# Patient Record
Sex: MALE | Race: BLACK OR AFRICAN AMERICAN | NOT HISPANIC OR LATINO | Employment: OTHER | RURAL
[De-identification: names, ages, dates, MRNs, and addresses within clinical notes are randomized per-mention and may not be internally consistent; named-entity substitution may affect disease eponyms.]

---

## 2020-07-31 ENCOUNTER — HISTORICAL (OUTPATIENT)
Dept: ADMINISTRATIVE | Facility: HOSPITAL | Age: 50
End: 2020-07-31

## 2020-07-31 LAB — SARS-COV+SARS-COV-2 AG RESP QL IA.RAPID: NEGATIVE

## 2020-10-12 ENCOUNTER — HISTORICAL (OUTPATIENT)
Dept: ADMINISTRATIVE | Facility: HOSPITAL | Age: 50
End: 2020-10-12

## 2020-10-12 LAB
ANION GAP SERPL CALCULATED.3IONS-SCNC: 6 MMOL/L (ref 7–16)
APTT PPP: 22.1 SECONDS (ref 25.2–37.3)
BACTERIA #/AREA URNS HPF: ABNORMAL /HPF
BASOPHILS # BLD AUTO: 0.02 X10E3/UL (ref 0–0.2)
BASOPHILS NFR BLD AUTO: 0.3 % (ref 0–1)
BILIRUB UR QL STRIP: NEGATIVE MG/DL
BUN SERPL-MCNC: 6 MG/DL (ref 7–18)
CALCIUM SERPL-MCNC: 9 MG/DL (ref 8.5–10.1)
CHLORIDE SERPL-SCNC: 101 MMOL/L (ref 98–107)
CLARITY UR: CLEAR
CO2 SERPL-SCNC: 34 MMOL/L (ref 21–32)
COLOR UR: YELLOW
CREAT SERPL-MCNC: 1.06 MG/DL (ref 0.7–1.3)
EOSINOPHIL # BLD AUTO: 0.23 X10E3/UL (ref 0–0.5)
EOSINOPHIL NFR BLD AUTO: 3.6 % (ref 1–4)
ERYTHROCYTE [DISTWIDTH] IN BLOOD BY AUTOMATED COUNT: 17.1 % (ref 11.5–14.5)
GLUCOSE SERPL-MCNC: 115 MG/DL (ref 74–106)
GLUCOSE UR STRIP-MCNC: NEGATIVE MG/DL
HCT VFR BLD AUTO: 42.5 % (ref 40–54)
HGB BLD-MCNC: 13.1 G/DL (ref 13.5–18)
IMM GRANULOCYTES # BLD AUTO: 0.03 X10E3/UL (ref 0–0.04)
IMM GRANULOCYTES NFR BLD: 0.5 % (ref 0–0.4)
INR BLD: 1.04 (ref 0–3.3)
KETONES UR STRIP-SCNC: NEGATIVE MG/DL
LEUKOCYTE ESTERASE UR QL STRIP: NEGATIVE LEU/UL
LYMPHOCYTES # BLD AUTO: 3.05 X10E3/UL (ref 1–4.8)
LYMPHOCYTES NFR BLD AUTO: 47.1 % (ref 27–41)
MCH RBC QN AUTO: 27.3 PG (ref 27–31)
MCHC RBC AUTO-ENTMCNC: 30.8 G/DL (ref 32–36)
MCV RBC AUTO: 88.7 FL (ref 80–96)
MONOCYTES # BLD AUTO: 0.44 X10E3/UL (ref 0–0.8)
MONOCYTES NFR BLD AUTO: 6.8 % (ref 2–6)
MPC BLD CALC-MCNC: 10.1 FL (ref 9.4–12.4)
NEUTROPHILS # BLD AUTO: 2.7 X10E3/UL (ref 1.8–7.7)
NEUTROPHILS NFR BLD AUTO: 41.7 % (ref 53–65)
NITRITE UR QL STRIP: NEGATIVE
NRBC # BLD AUTO: 0 X10E3/UL (ref 0–0)
NRBC, AUTO (.00): 0 /100 (ref 0–0)
PH UR STRIP: 6 PH UNITS (ref 5–8)
PLATELET # BLD AUTO: 371 X10E3/UL (ref 150–400)
POTASSIUM SERPL-SCNC: 3.9 MMOL/L (ref 3.5–5.1)
PROT UR QL STRIP: NEGATIVE MG/DL
PROTHROMBIN TIME: 13.1 SECONDS (ref 11.7–14.7)
RBC # BLD AUTO: 4.79 X10E6/UL (ref 4.6–6.2)
RBC # UR STRIP: NEGATIVE ERY/UL
RBC #/AREA URNS HPF: ABNORMAL /HPF (ref 0–3)
SARS-COV+SARS-COV-2 AG RESP QL IA.RAPID: NEGATIVE
SODIUM SERPL-SCNC: 137 MMOL/L (ref 136–145)
SP GR UR STRIP: 1.02 (ref 1–1.03)
SQUAMOUS #/AREA URNS LPF: ABNORMAL /LPF
UROBILINOGEN UR STRIP-ACNC: 1 EU/DL
WBC # BLD AUTO: 6.47 X10E3/UL (ref 4.5–11)
WBC #/AREA URNS HPF: ABNORMAL /HPF (ref 0–5)

## 2020-10-15 ENCOUNTER — HISTORICAL (OUTPATIENT)
Dept: ADMINISTRATIVE | Facility: HOSPITAL | Age: 50
End: 2020-10-15

## 2020-10-15 LAB
CHOLEST SERPL-MCNC: 200 MG/DL
CHOLEST/HDLC SERPL: 5.1 {RATIO}
EST. AVERAGE GLUCOSE BLD GHB EST-MCNC: 104 MG/DL
HBA1C MFR BLD HPLC: 5.7 %
HDLC SERPL-MCNC: 39 MG/DL
LDLC SERPL CALC-MCNC: 142 MG/DL
TRIGL SERPL-MCNC: 97 MG/DL

## 2021-04-13 RX ORDER — ASPIRIN 325 MG
325 TABLET ORAL DAILY
COMMUNITY
End: 2021-06-14

## 2021-04-13 RX ORDER — OXYCODONE AND ACETAMINOPHEN 10; 325 MG/1; MG/1
1 TABLET ORAL EVERY 12 HOURS PRN
COMMUNITY

## 2021-04-13 RX ORDER — GABAPENTIN 600 MG/1
600 TABLET ORAL DAILY
COMMUNITY

## 2021-04-13 RX ORDER — ATORVASTATIN CALCIUM 10 MG/1
10 TABLET, FILM COATED ORAL DAILY
COMMUNITY
End: 2021-06-10

## 2021-04-13 RX ORDER — DICLOFENAC SODIUM 10 MG/G
2 GEL TOPICAL 4 TIMES DAILY
COMMUNITY

## 2021-04-13 RX ORDER — CYCLOBENZAPRINE HCL 10 MG
10 TABLET ORAL 3 TIMES DAILY PRN
COMMUNITY

## 2021-04-14 ENCOUNTER — HOSPITAL ENCOUNTER (OUTPATIENT)
Facility: HOSPITAL | Age: 51
Discharge: HOME | End: 2021-04-14
Attending: ANESTHESIOLOGY | Admitting: ANESTHESIOLOGY
Payer: COMMERCIAL

## 2021-04-14 VITALS
DIASTOLIC BLOOD PRESSURE: 106 MMHG | HEIGHT: 76 IN | HEART RATE: 83 BPM | RESPIRATION RATE: 18 BRPM | SYSTOLIC BLOOD PRESSURE: 179 MMHG | BODY MASS INDEX: 31.8 KG/M2 | WEIGHT: 261.19 LBS | OXYGEN SATURATION: 97 % | TEMPERATURE: 99 F

## 2021-04-14 DIAGNOSIS — G89.4 CHRONIC PAIN SYNDROME: ICD-10-CM

## 2021-04-14 PROCEDURE — 62370 ANL SP INF PMP W/MDREPRG&FIL: CPT

## 2021-04-14 PROCEDURE — 63600175 PHARM REV CODE 636 W HCPCS

## 2021-04-14 RX ORDER — AMLODIPINE BESYLATE 5 MG/1
5 TABLET ORAL DAILY
COMMUNITY
End: 2021-09-21 | Stop reason: SDUPTHER

## 2021-06-03 RX ORDER — LISINOPRIL 20 MG/1
TABLET ORAL
COMMUNITY
Start: 2021-03-01 | End: 2022-03-07

## 2021-06-03 RX ORDER — CARVEDILOL 12.5 MG/1
TABLET ORAL
COMMUNITY
Start: 2020-12-07 | End: 2021-06-14 | Stop reason: SDUPTHER

## 2021-06-03 RX ORDER — TESTOSTERONE CYPIONATE 200 MG/ML
INJECTION, SOLUTION INTRAMUSCULAR
COMMUNITY
Start: 2021-04-05

## 2021-06-03 RX ORDER — LISINOPRIL 10 MG/1
TABLET ORAL
COMMUNITY
Start: 2020-12-07 | End: 2021-06-10

## 2021-06-03 RX ORDER — ATORVASTATIN CALCIUM 80 MG/1
80 TABLET, FILM COATED ORAL NIGHTLY
COMMUNITY
Start: 2020-12-07 | End: 2023-02-14 | Stop reason: ALTCHOICE

## 2021-06-10 ENCOUNTER — OFFICE VISIT (OUTPATIENT)
Dept: DERMATOLOGY | Facility: CLINIC | Age: 51
End: 2021-06-10
Payer: COMMERCIAL

## 2021-06-10 VITALS — WEIGHT: 264 LBS | RESPIRATION RATE: 16 BRPM | HEIGHT: 76 IN | BODY MASS INDEX: 32.15 KG/M2

## 2021-06-10 DIAGNOSIS — R20.2 NOTALGIA PARESTHETICA: Primary | ICD-10-CM

## 2021-06-10 DIAGNOSIS — L73.9 FOLLICULITIS: ICD-10-CM

## 2021-06-10 PROCEDURE — 99203 PR OFFICE/OUTPT VISIT, NEW, LEVL III, 30-44 MIN: ICD-10-PCS | Mod: ,,, | Performed by: DERMATOLOGY

## 2021-06-10 PROCEDURE — 99203 OFFICE O/P NEW LOW 30 MIN: CPT | Mod: ,,, | Performed by: DERMATOLOGY

## 2021-06-10 PROCEDURE — 3008F PR BODY MASS INDEX (BMI) DOCUMENTED: ICD-10-PCS | Mod: CPTII,,, | Performed by: DERMATOLOGY

## 2021-06-10 PROCEDURE — 3008F BODY MASS INDEX DOCD: CPT | Mod: CPTII,,, | Performed by: DERMATOLOGY

## 2021-06-10 RX ORDER — BENZOYL PEROXIDE 100 MG/ML
LIQUID TOPICAL
Qty: 148 G | Refills: 12 | Status: SHIPPED | OUTPATIENT
Start: 2021-06-10 | End: 2021-09-21

## 2021-06-10 RX ORDER — ERGOCALCIFEROL 1.25 MG/1
50000 CAPSULE ORAL
COMMUNITY
End: 2022-05-10 | Stop reason: ALTCHOICE

## 2021-06-10 RX ORDER — DOXYCYCLINE 100 MG/1
100 CAPSULE ORAL 2 TIMES DAILY
Qty: 28 CAPSULE | Refills: 0 | Status: SHIPPED | OUTPATIENT
Start: 2021-06-10 | End: 2022-05-10 | Stop reason: ALTCHOICE

## 2021-06-14 ENCOUNTER — OFFICE VISIT (OUTPATIENT)
Dept: CARDIOLOGY | Facility: CLINIC | Age: 51
End: 2021-06-14
Payer: COMMERCIAL

## 2021-06-14 VITALS
WEIGHT: 248 LBS | BODY MASS INDEX: 31.83 KG/M2 | RESPIRATION RATE: 18 BRPM | DIASTOLIC BLOOD PRESSURE: 82 MMHG | HEIGHT: 74 IN | HEART RATE: 64 BPM | SYSTOLIC BLOOD PRESSURE: 120 MMHG

## 2021-06-14 DIAGNOSIS — I42.8 NON-ISCHEMIC CARDIOMYOPATHY: Primary | ICD-10-CM

## 2021-06-14 DIAGNOSIS — E78.2 MIXED HYPERLIPIDEMIA: ICD-10-CM

## 2021-06-14 DIAGNOSIS — I10 HYPERTENSION, UNSPECIFIED TYPE: Primary | ICD-10-CM

## 2021-06-14 DIAGNOSIS — I10 ESSENTIAL HYPERTENSION: ICD-10-CM

## 2021-06-14 DIAGNOSIS — I10 HYPERTENSION, UNSPECIFIED TYPE: ICD-10-CM

## 2021-06-14 PROCEDURE — 3008F BODY MASS INDEX DOCD: CPT | Mod: CPTII,,, | Performed by: STUDENT IN AN ORGANIZED HEALTH CARE EDUCATION/TRAINING PROGRAM

## 2021-06-14 PROCEDURE — 99214 OFFICE O/P EST MOD 30 MIN: CPT | Mod: S$PBB,,, | Performed by: STUDENT IN AN ORGANIZED HEALTH CARE EDUCATION/TRAINING PROGRAM

## 2021-06-14 PROCEDURE — 3008F PR BODY MASS INDEX (BMI) DOCUMENTED: ICD-10-PCS | Mod: CPTII,,, | Performed by: STUDENT IN AN ORGANIZED HEALTH CARE EDUCATION/TRAINING PROGRAM

## 2021-06-14 PROCEDURE — 93010 EKG 12-LEAD: ICD-10-PCS | Mod: S$PBB,,, | Performed by: STUDENT IN AN ORGANIZED HEALTH CARE EDUCATION/TRAINING PROGRAM

## 2021-06-14 PROCEDURE — 99214 OFFICE O/P EST MOD 30 MIN: CPT | Mod: PBBFAC,25 | Performed by: STUDENT IN AN ORGANIZED HEALTH CARE EDUCATION/TRAINING PROGRAM

## 2021-06-14 PROCEDURE — 99214 PR OFFICE/OUTPT VISIT, EST, LEVL IV, 30-39 MIN: ICD-10-PCS | Mod: S$PBB,,, | Performed by: STUDENT IN AN ORGANIZED HEALTH CARE EDUCATION/TRAINING PROGRAM

## 2021-06-14 PROCEDURE — 93010 ELECTROCARDIOGRAM REPORT: CPT | Mod: S$PBB,,, | Performed by: STUDENT IN AN ORGANIZED HEALTH CARE EDUCATION/TRAINING PROGRAM

## 2021-06-14 PROCEDURE — 93005 ELECTROCARDIOGRAM TRACING: CPT | Mod: PBBFAC | Performed by: STUDENT IN AN ORGANIZED HEALTH CARE EDUCATION/TRAINING PROGRAM

## 2021-06-14 RX ORDER — FLUTICASONE PROPIONATE 50 MCG
SPRAY, SUSPENSION (ML) NASAL
COMMUNITY
Start: 2021-03-01 | End: 2022-11-15 | Stop reason: SDUPTHER

## 2021-06-14 RX ORDER — CARVEDILOL 25 MG/1
25 TABLET ORAL 2 TIMES DAILY WITH MEALS
Qty: 180 TABLET | Refills: 3 | Status: SHIPPED | OUTPATIENT
Start: 2021-06-14 | End: 2022-08-25

## 2021-06-14 RX ORDER — NAPROXEN SODIUM 220 MG/1
81 TABLET, FILM COATED ORAL DAILY
Qty: 90 TABLET | Refills: 3 | Status: SHIPPED | OUTPATIENT
Start: 2021-06-14 | End: 2022-09-26 | Stop reason: SDUPTHER

## 2021-08-11 ENCOUNTER — OFFICE VISIT (OUTPATIENT)
Dept: DERMATOLOGY | Facility: CLINIC | Age: 51
End: 2021-08-11
Payer: COMMERCIAL

## 2021-08-11 VITALS — RESPIRATION RATE: 16 BRPM | HEIGHT: 74 IN | WEIGHT: 248 LBS | BODY MASS INDEX: 31.83 KG/M2

## 2021-08-11 DIAGNOSIS — L73.9 FOLLICULITIS: Primary | ICD-10-CM

## 2021-08-11 PROCEDURE — 1160F RVW MEDS BY RX/DR IN RCRD: CPT | Mod: CPTII,,, | Performed by: DERMATOLOGY

## 2021-08-11 PROCEDURE — 99212 PR OFFICE/OUTPT VISIT, EST, LEVL II, 10-19 MIN: ICD-10-PCS | Mod: ,,, | Performed by: DERMATOLOGY

## 2021-08-11 PROCEDURE — 3008F BODY MASS INDEX DOCD: CPT | Mod: CPTII,,, | Performed by: DERMATOLOGY

## 2021-08-11 PROCEDURE — 1159F MED LIST DOCD IN RCRD: CPT | Mod: CPTII,,, | Performed by: DERMATOLOGY

## 2021-08-11 PROCEDURE — 1159F PR MEDICATION LIST DOCUMENTED IN MEDICAL RECORD: ICD-10-PCS | Mod: CPTII,,, | Performed by: DERMATOLOGY

## 2021-08-11 PROCEDURE — 99212 OFFICE O/P EST SF 10 MIN: CPT | Mod: ,,, | Performed by: DERMATOLOGY

## 2021-08-11 PROCEDURE — 3008F PR BODY MASS INDEX (BMI) DOCUMENTED: ICD-10-PCS | Mod: CPTII,,, | Performed by: DERMATOLOGY

## 2021-08-11 PROCEDURE — 1160F PR REVIEW ALL MEDS BY PRESCRIBER/CLIN PHARMACIST DOCUMENTED: ICD-10-PCS | Mod: CPTII,,, | Performed by: DERMATOLOGY

## 2021-08-11 RX ORDER — ASPIRIN 325 MG
TABLET, DELAYED RELEASE (ENTERIC COATED) ORAL
COMMUNITY
Start: 2021-07-06 | End: 2022-11-15

## 2021-09-14 ENCOUNTER — HOSPITAL ENCOUNTER (OUTPATIENT)
Dept: CARDIOLOGY | Facility: HOSPITAL | Age: 51
Discharge: HOME | End: 2021-09-14
Attending: STUDENT IN AN ORGANIZED HEALTH CARE EDUCATION/TRAINING PROGRAM
Payer: MEDICARE

## 2021-09-14 DIAGNOSIS — I42.8 NON-ISCHEMIC CARDIOMYOPATHY: ICD-10-CM

## 2021-09-14 PROCEDURE — 93306 TTE W/DOPPLER COMPLETE: CPT

## 2021-09-14 PROCEDURE — 93306 TTE W/DOPPLER COMPLETE: CPT | Mod: 26,,, | Performed by: STUDENT IN AN ORGANIZED HEALTH CARE EDUCATION/TRAINING PROGRAM

## 2021-09-14 PROCEDURE — 93306 ECHO (CUPID ONLY): ICD-10-PCS | Mod: 26,,, | Performed by: STUDENT IN AN ORGANIZED HEALTH CARE EDUCATION/TRAINING PROGRAM

## 2021-09-20 LAB
AORTIC ROOT ANNULUS: 2.8 CM
AORTIC VALVE CUSP SEPERATION: 2.42 CM
AV INDEX (PROSTH): 0.58
AV MEAN GRADIENT: 5 MMHG
AV PEAK GRADIENT: 9 MMHG
AV VALVE AREA: 2.63 CM2
AV VELOCITY RATIO: 0.67
CV ECHO LV RWT: 0.6 CM
DOP CALC AO PEAK VEL: 1.5 M/S
DOP CALC AO VTI: 31 CM
DOP CALC LVOT AREA: 4.5 CM2
DOP CALC LVOT DIAMETER: 2.4 CM
DOP CALC LVOT PEAK VEL: 1 M/S
DOP CALC LVOT STROKE VOLUME: 81.39 CM3
DOP CALCLVOT PEAK VEL VTI: 18 CM
E WAVE DECELERATION TIME: 192 MSEC
ECHO EF ESTIMATED: 45 %
ECHO LV POSTERIOR WALL: 1.54 CM (ref 0.6–1.1)
EJECTION FRACTION: 45 %
FRACTIONAL SHORTENING: 23 % (ref 28–44)
INTERVENTRICULAR SEPTUM: 1.57 CM (ref 0.6–1.1)
IVC OSTIUM: 1.3 CM
LEFT ATRIUM SIZE: 3.8 CM
LEFT INTERNAL DIMENSION IN SYSTOLE: 4 CM (ref 2.1–4)
LEFT VENTRICULAR INTERNAL DIMENSION IN DIASTOLE: 5.17 CM (ref 3.5–6)
LEFT VENTRICULAR MASS: 357.92 G
LVOT MG: 2 MMHG
MV PEAK E VEL: 0.6 M/S
PISA TR MAX VEL: 2.1 M/S
RA MAJOR: 3.9 CM
RA PRESSURE: 3 MMHG
RIGHT VENTRICULAR END-DIASTOLIC DIMENSION: 3.8 CM
TR MAX PG: 18 MMHG
TRICUSPID ANNULAR PLANE SYSTOLIC EXCURSION: 2.1 CM
TV REST PULMONARY ARTERY PRESSURE: 21 MMHG

## 2021-09-21 ENCOUNTER — OFFICE VISIT (OUTPATIENT)
Dept: CARDIOLOGY | Facility: CLINIC | Age: 51
End: 2021-09-21
Payer: MEDICARE

## 2021-09-21 VITALS
HEART RATE: 64 BPM | RESPIRATION RATE: 18 BRPM | WEIGHT: 252 LBS | SYSTOLIC BLOOD PRESSURE: 110 MMHG | DIASTOLIC BLOOD PRESSURE: 70 MMHG | HEIGHT: 74 IN | BODY MASS INDEX: 32.34 KG/M2

## 2021-09-21 DIAGNOSIS — I42.8 NON-ISCHEMIC CARDIOMYOPATHY: ICD-10-CM

## 2021-09-21 DIAGNOSIS — I10 ESSENTIAL HYPERTENSION: ICD-10-CM

## 2021-09-21 PROCEDURE — 99214 OFFICE O/P EST MOD 30 MIN: CPT | Mod: PBBFAC | Performed by: STUDENT IN AN ORGANIZED HEALTH CARE EDUCATION/TRAINING PROGRAM

## 2021-09-21 PROCEDURE — 3078F PR MOST RECENT DIASTOLIC BLOOD PRESSURE < 80 MM HG: ICD-10-PCS | Mod: CPTII,,, | Performed by: STUDENT IN AN ORGANIZED HEALTH CARE EDUCATION/TRAINING PROGRAM

## 2021-09-21 PROCEDURE — 3008F BODY MASS INDEX DOCD: CPT | Mod: CPTII,,, | Performed by: STUDENT IN AN ORGANIZED HEALTH CARE EDUCATION/TRAINING PROGRAM

## 2021-09-21 PROCEDURE — 99214 PR OFFICE/OUTPT VISIT, EST, LEVL IV, 30-39 MIN: ICD-10-PCS | Mod: S$PBB,,, | Performed by: STUDENT IN AN ORGANIZED HEALTH CARE EDUCATION/TRAINING PROGRAM

## 2021-09-21 PROCEDURE — 99214 OFFICE O/P EST MOD 30 MIN: CPT | Mod: S$PBB,,, | Performed by: STUDENT IN AN ORGANIZED HEALTH CARE EDUCATION/TRAINING PROGRAM

## 2021-09-21 PROCEDURE — 3074F SYST BP LT 130 MM HG: CPT | Mod: CPTII,,, | Performed by: STUDENT IN AN ORGANIZED HEALTH CARE EDUCATION/TRAINING PROGRAM

## 2021-09-21 PROCEDURE — 3008F PR BODY MASS INDEX (BMI) DOCUMENTED: ICD-10-PCS | Mod: CPTII,,, | Performed by: STUDENT IN AN ORGANIZED HEALTH CARE EDUCATION/TRAINING PROGRAM

## 2021-09-21 PROCEDURE — 4010F ACE/ARB THERAPY RXD/TAKEN: CPT | Mod: CPTII,,, | Performed by: STUDENT IN AN ORGANIZED HEALTH CARE EDUCATION/TRAINING PROGRAM

## 2021-09-21 PROCEDURE — 3074F PR MOST RECENT SYSTOLIC BLOOD PRESSURE < 130 MM HG: ICD-10-PCS | Mod: CPTII,,, | Performed by: STUDENT IN AN ORGANIZED HEALTH CARE EDUCATION/TRAINING PROGRAM

## 2021-09-21 PROCEDURE — 1159F PR MEDICATION LIST DOCUMENTED IN MEDICAL RECORD: ICD-10-PCS | Mod: CPTII,,, | Performed by: STUDENT IN AN ORGANIZED HEALTH CARE EDUCATION/TRAINING PROGRAM

## 2021-09-21 PROCEDURE — 3078F DIAST BP <80 MM HG: CPT | Mod: CPTII,,, | Performed by: STUDENT IN AN ORGANIZED HEALTH CARE EDUCATION/TRAINING PROGRAM

## 2021-09-21 PROCEDURE — 1159F MED LIST DOCD IN RCRD: CPT | Mod: CPTII,,, | Performed by: STUDENT IN AN ORGANIZED HEALTH CARE EDUCATION/TRAINING PROGRAM

## 2021-09-21 PROCEDURE — 4010F PR ACE/ARB THEARPY RXD/TAKEN: ICD-10-PCS | Mod: CPTII,,, | Performed by: STUDENT IN AN ORGANIZED HEALTH CARE EDUCATION/TRAINING PROGRAM

## 2021-09-21 RX ORDER — AMLODIPINE BESYLATE 5 MG/1
5 TABLET ORAL DAILY
Qty: 90 TABLET | Refills: 3 | Status: SHIPPED | OUTPATIENT
Start: 2021-09-21 | End: 2022-09-26 | Stop reason: SDUPTHER

## 2021-10-25 DIAGNOSIS — Z11.59 SCREENING FOR VIRAL DISEASE: Primary | ICD-10-CM

## 2021-10-27 ENCOUNTER — HOSPITAL ENCOUNTER (OUTPATIENT)
Facility: HOSPITAL | Age: 51
Discharge: HOME | End: 2021-10-27
Attending: ANESTHESIOLOGY | Admitting: ANESTHESIOLOGY
Payer: MEDICARE

## 2021-10-27 VITALS
OXYGEN SATURATION: 96 % | WEIGHT: 260 LBS | BODY MASS INDEX: 31.66 KG/M2 | HEART RATE: 68 BPM | HEIGHT: 76 IN | RESPIRATION RATE: 18 BRPM | DIASTOLIC BLOOD PRESSURE: 88 MMHG | SYSTOLIC BLOOD PRESSURE: 149 MMHG

## 2021-10-27 DIAGNOSIS — G89.4 CHRONIC PAIN SYNDROME: ICD-10-CM

## 2021-10-27 PROCEDURE — 27201423 OPTIME MED/SURG SUP & DEVICES STERILE SUPPLY: Performed by: ANESTHESIOLOGY

## 2021-10-27 PROCEDURE — 62370 ANL SP INF PMP W/MDREPRG&FIL: CPT | Performed by: ANESTHESIOLOGY

## 2022-04-13 ENCOUNTER — HOSPITAL ENCOUNTER (OUTPATIENT)
Facility: HOSPITAL | Age: 52
Discharge: HOME | End: 2022-04-13
Attending: ANESTHESIOLOGY | Admitting: ANESTHESIOLOGY
Payer: MEDICARE

## 2022-04-13 VITALS
HEIGHT: 76 IN | RESPIRATION RATE: 18 BRPM | OXYGEN SATURATION: 95 % | SYSTOLIC BLOOD PRESSURE: 138 MMHG | HEART RATE: 78 BPM | DIASTOLIC BLOOD PRESSURE: 83 MMHG | WEIGHT: 273 LBS | BODY MASS INDEX: 33.24 KG/M2

## 2022-04-13 DIAGNOSIS — G89.4 CHRONIC PAIN SYNDROME: ICD-10-CM

## 2022-04-13 PROCEDURE — 62370 ANL SP INF PMP W/MDREPRG&FIL: CPT | Performed by: ANESTHESIOLOGY

## 2022-04-13 PROCEDURE — 63600175 PHARM REV CODE 636 W HCPCS: Performed by: ANESTHESIOLOGY

## 2022-04-13 RX ADMIN — MORPHINE SULFATE 400 MG: 10 INJECTION, SOLUTION EPIDURAL; INTRATHECAL at 11:04

## 2022-04-13 NOTE — OP NOTE
Intrathecal pump refill and reprogramming           The patient was identified in the exam room and the pump was interrogated using the Medtronic system.  The pump was noted to have 4.0 cc remaining of a solution of morphine 10 mg per mL.  The patient's abdomen was marked in the appropriate location and prepped and draped in usual sterile fashion.  A 40 mL Medtronic pump refill kit was utilized for this procedure.  After the patient abdomen was prepped and draped in the usual sterile fashion, the Medtronic 40 mL template was placed on the skin overlying the target area of the pump orifice.  A 22-gauge noncoring needle was then utilized to access the pump.  A 20 mL syringe was utilized to aspirate the contents of the pump.Actual removed 4cc.   The solution was clear with no signs of serous fluid or blood.  The patient then had a 40 mL allotment of morphine 10 mg per mL injected incrementally using the syringe and filter.  The needle was removed with its tip intact and the patient tolerated the procedure well with no adverse events.  The pump was then reprogrammed using the Medtronic system.

## 2022-05-10 ENCOUNTER — OFFICE VISIT (OUTPATIENT)
Dept: FAMILY MEDICINE | Facility: CLINIC | Age: 52
End: 2022-05-10
Payer: MEDICARE

## 2022-05-10 VITALS
TEMPERATURE: 98 F | BODY MASS INDEX: 32.39 KG/M2 | HEART RATE: 71 BPM | WEIGHT: 266 LBS | OXYGEN SATURATION: 98 % | RESPIRATION RATE: 18 BRPM | HEIGHT: 76 IN | SYSTOLIC BLOOD PRESSURE: 134 MMHG | DIASTOLIC BLOOD PRESSURE: 86 MMHG

## 2022-05-10 DIAGNOSIS — R53.83 MALAISE AND FATIGUE: ICD-10-CM

## 2022-05-10 DIAGNOSIS — R53.81 MALAISE AND FATIGUE: ICD-10-CM

## 2022-05-10 DIAGNOSIS — J01.00 ACUTE NON-RECURRENT MAXILLARY SINUSITIS: ICD-10-CM

## 2022-05-10 DIAGNOSIS — R05.9 COUGH: Primary | ICD-10-CM

## 2022-05-10 LAB
CTP QC/QA: YES
FLUAV AG NPH QL: NEGATIVE
FLUBV AG NPH QL: NEGATIVE
SARS-COV-2 AG RESP QL IA.RAPID: NEGATIVE

## 2022-05-10 PROCEDURE — 3008F PR BODY MASS INDEX (BMI) DOCUMENTED: ICD-10-PCS | Mod: ,,, | Performed by: FAMILY MEDICINE

## 2022-05-10 PROCEDURE — 3075F PR MOST RECENT SYSTOLIC BLOOD PRESS GE 130-139MM HG: ICD-10-PCS | Mod: ,,, | Performed by: FAMILY MEDICINE

## 2022-05-10 PROCEDURE — 96372 THER/PROPH/DIAG INJ SC/IM: CPT | Mod: ,,, | Performed by: FAMILY MEDICINE

## 2022-05-10 PROCEDURE — 87428 POCT SARS-COV2 (COVID) WITH FLU ANTIGEN: ICD-10-PCS | Mod: QW,,, | Performed by: FAMILY MEDICINE

## 2022-05-10 PROCEDURE — 3079F PR MOST RECENT DIASTOLIC BLOOD PRESSURE 80-89 MM HG: ICD-10-PCS | Mod: ,,, | Performed by: FAMILY MEDICINE

## 2022-05-10 PROCEDURE — 1159F MED LIST DOCD IN RCRD: CPT | Mod: ,,, | Performed by: FAMILY MEDICINE

## 2022-05-10 PROCEDURE — 3079F DIAST BP 80-89 MM HG: CPT | Mod: ,,, | Performed by: FAMILY MEDICINE

## 2022-05-10 PROCEDURE — 96372 PR INJECTION,THERAP/PROPH/DIAG2ST, IM OR SUBCUT: ICD-10-PCS | Mod: ,,, | Performed by: FAMILY MEDICINE

## 2022-05-10 PROCEDURE — 3075F SYST BP GE 130 - 139MM HG: CPT | Mod: ,,, | Performed by: FAMILY MEDICINE

## 2022-05-10 PROCEDURE — 1160F RVW MEDS BY RX/DR IN RCRD: CPT | Mod: ,,, | Performed by: FAMILY MEDICINE

## 2022-05-10 PROCEDURE — 87428 SARSCOV & INF VIR A&B AG IA: CPT | Mod: QW,,, | Performed by: FAMILY MEDICINE

## 2022-05-10 PROCEDURE — 99213 PR OFFICE/OUTPT VISIT, EST, LEVL III, 20-29 MIN: ICD-10-PCS | Mod: 25,,, | Performed by: FAMILY MEDICINE

## 2022-05-10 PROCEDURE — 4010F PR ACE/ARB THEARPY RXD/TAKEN: ICD-10-PCS | Mod: ,,, | Performed by: FAMILY MEDICINE

## 2022-05-10 PROCEDURE — 1160F PR REVIEW ALL MEDS BY PRESCRIBER/CLIN PHARMACIST DOCUMENTED: ICD-10-PCS | Mod: ,,, | Performed by: FAMILY MEDICINE

## 2022-05-10 PROCEDURE — 3008F BODY MASS INDEX DOCD: CPT | Mod: ,,, | Performed by: FAMILY MEDICINE

## 2022-05-10 PROCEDURE — 1159F PR MEDICATION LIST DOCUMENTED IN MEDICAL RECORD: ICD-10-PCS | Mod: ,,, | Performed by: FAMILY MEDICINE

## 2022-05-10 PROCEDURE — 4010F ACE/ARB THERAPY RXD/TAKEN: CPT | Mod: ,,, | Performed by: FAMILY MEDICINE

## 2022-05-10 PROCEDURE — 99213 OFFICE O/P EST LOW 20 MIN: CPT | Mod: 25,,, | Performed by: FAMILY MEDICINE

## 2022-05-10 RX ORDER — BETAMETHASONE SODIUM PHOSPHATE AND BETAMETHASONE ACETATE 3; 3 MG/ML; MG/ML
6 INJECTION, SUSPENSION INTRA-ARTICULAR; INTRALESIONAL; INTRAMUSCULAR; SOFT TISSUE
Status: COMPLETED | OUTPATIENT
Start: 2022-05-10 | End: 2022-05-10

## 2022-05-10 RX ORDER — BENZONATATE 100 MG/1
100 CAPSULE ORAL 3 TIMES DAILY PRN
Qty: 60 CAPSULE | Refills: 2 | Status: SHIPPED | OUTPATIENT
Start: 2022-05-10 | End: 2022-05-20

## 2022-05-10 RX ORDER — SULFAMETHOXAZOLE AND TRIMETHOPRIM 800; 160 MG/1; MG/1
1 TABLET ORAL 2 TIMES DAILY
Qty: 20 TABLET | Refills: 0 | Status: SHIPPED | OUTPATIENT
Start: 2022-05-10 | End: 2022-11-15

## 2022-05-10 RX ORDER — LINCOMYCIN HYDROCHLORIDE 300 MG/ML
600 INJECTION, SOLUTION INTRAMUSCULAR; INTRAVENOUS; SUBCONJUNCTIVAL
Status: COMPLETED | OUTPATIENT
Start: 2022-05-10 | End: 2022-05-10

## 2022-05-10 RX ORDER — FLUTICASONE PROPIONATE 50 MCG
2 SPRAY, SUSPENSION (ML) NASAL DAILY
Qty: 16 G | Refills: 2 | Status: SHIPPED | OUTPATIENT
Start: 2022-05-10 | End: 2022-08-03

## 2022-05-10 RX ADMIN — BETAMETHASONE SODIUM PHOSPHATE AND BETAMETHASONE ACETATE 6 MG: 3; 3 INJECTION, SUSPENSION INTRA-ARTICULAR; INTRALESIONAL; INTRAMUSCULAR; SOFT TISSUE at 04:05

## 2022-05-10 RX ADMIN — LINCOMYCIN HYDROCHLORIDE 600 MG: 300 INJECTION, SOLUTION INTRAMUSCULAR; INTRAVENOUS; SUBCONJUNCTIVAL at 04:05

## 2022-05-10 NOTE — PROGRESS NOTES
Jesse Andrade is a 52 y.o. male seen today for a 3 week history of worsening nasal congestion postnasal drainage and occasionally productive cough.  So far patient has been afebrile.  His symptoms have not responded to over-the-counter treatments.    Past Medical History:   Diagnosis Date    Arthritis     Hyperlipidemia     Hypertension      Family History   Problem Relation Age of Onset    Coronary artery disease Mother      Current Outpatient Medications on File Prior to Visit   Medication Sig Dispense Refill    amLODIPine (NORVASC) 5 MG tablet Take 1 tablet (5 mg total) by mouth once daily. 90 tablet 3    aspirin 81 MG Chew Take 1 tablet (81 mg total) by mouth once daily. 90 tablet 3    atorvastatin (LIPITOR) 80 MG tablet       calcium carbonate/vitamin D3 (VITAMIN D-3 ORAL) Take by mouth.      carvediloL (COREG) 25 MG tablet Take 1 tablet (25 mg total) by mouth 2 (two) times daily with meals. 180 tablet 3    cholecalciferol, vitamin D3, 1,250 mcg (50,000 unit) capsule       cyclobenzaprine (FLEXERIL) 10 MG tablet Take 10 mg by mouth 3 (three) times daily as needed for Muscle spasms.      diclofenac sodium (VOLTAREN) 1 % Gel Apply 2 g topically 4 (four) times daily.      fluticasone propionate (FLONASE) 50 mcg/actuation nasal spray       gabapentin (NEURONTIN) 600 MG tablet Take 600 mg by mouth once daily.      lisinopriL (PRINIVIL,ZESTRIL) 20 MG tablet TAKE 1 TABLET BY MOUTH ONCE DAILY FOR BLOOD PRESSURE 90 tablet 1    oxyCODONE-acetaminophen (PERCOCET)  mg per tablet Take 1 tablet by mouth every 12 (twelve) hours as needed for Pain.      testosterone cypionate (DEPOTESTOTERONE CYPIONATE) 200 mg/mL injection       doxycycline (VIBRAMYCIN) 100 MG Cap Take 1 capsule (100 mg total) by mouth 2 (two) times daily. (Patient not taking: Reported on 5/10/2022) 28 capsule 0    ergocalciferol (ERGOCALCIFEROL) 50,000 unit Cap Take 50,000 Units by mouth every 7 days.       No current  facility-administered medications on file prior to visit.     Immunization History   Administered Date(s) Administered    COVID-19, MRNA, LN-S, PF (MODERNA FULL 0.5 ML DOSE) 06/29/2021, 08/06/2021       Review of Systems   Constitutional: Negative for fever, malaise/fatigue and weight loss.   HENT: Positive for congestion and sinus pain.    Respiratory: Positive for cough. Negative for shortness of breath.    Cardiovascular: Negative for chest pain and palpitations.   Gastrointestinal: Negative for nausea and vomiting.   Psychiatric/Behavioral: Negative for depression.        Vitals:    05/10/22 1507   BP: 134/86   Pulse: 71   Resp: 18   Temp: 98.3 °F (36.8 °C)       Physical Exam  Vitals reviewed.   Constitutional:       Appearance: Normal appearance.   HENT:      Head: Normocephalic.      Nose:      Right Turbinates: Swollen.      Left Turbinates: Swollen.      Left Sinus: Maxillary sinus tenderness present.   Eyes:      Extraocular Movements: Extraocular movements intact.      Conjunctiva/sclera: Conjunctivae normal.      Pupils: Pupils are equal, round, and reactive to light.   Neck:      Thyroid: No thyroid mass or thyromegaly.   Cardiovascular:      Rate and Rhythm: Normal rate and regular rhythm.      Heart sounds: Normal heart sounds. No murmur heard.    No gallop.   Pulmonary:      Effort: Pulmonary effort is normal. No respiratory distress.      Breath sounds: Rhonchi present. No wheezing or rales.   Skin:     General: Skin is warm and dry.      Coloration: Skin is not jaundiced or pale.   Neurological:      Mental Status: He is alert.   Psychiatric:         Mood and Affect: Mood normal.         Behavior: Behavior normal.         Thought Content: Thought content normal.         Judgment: Judgment normal.          Assessment and Plan  Cough  -     POCT SARS-COV2 (COVID) with Flu Antigen  -     X-Ray Chest PA And Lateral; Future; Expected date: 05/10/2022    Malaise and fatigue  -     POCT SARS-COV2 (COVID)  with Flu Antigen                  Chest x-ray was read as normal.      Return to clinic if symptoms worsen or persist.  I did recommend over-the-counter Mucinex 1200 mg plain b.i.d. with increase intake of water.    Health Maintenance Topics with due status: Not Due       Topic Last Completion Date    Influenza Vaccine Not Due

## 2022-06-29 DIAGNOSIS — G47.33 OSA (OBSTRUCTIVE SLEEP APNEA): Primary | ICD-10-CM

## 2022-09-12 ENCOUNTER — PROCEDURE VISIT (OUTPATIENT)
Dept: SLEEP MEDICINE | Facility: HOSPITAL | Age: 52
End: 2022-09-12
Attending: INTERNAL MEDICINE
Payer: MEDICARE

## 2022-09-12 DIAGNOSIS — G47.33 OSA (OBSTRUCTIVE SLEEP APNEA): ICD-10-CM

## 2022-09-12 PROCEDURE — 95811 POLYSOM 6/>YRS CPAP 4/> PARM: CPT | Mod: PO

## 2022-09-23 NOTE — PROGRESS NOTES
PCP: Otilio Vargas MD    Referring Provider:     Subjective:   Jesse Andrade is a 52 y.o. male with hx of HTN, HLD, NICM (LVEF recovered to 40-45%) who presents for followup.     22 -  Patient statse he has been doing well over the past year.   Patient denies chest pain or shortness of breath.   Blood pressure elevated.  States he is taking medications.     21 - Patient states he is doing well.  Denies chest pain, SOB or orthopnea.  Denies lower extremity edema.  Blood pressure controlled.      EKG  21:  Normal sinus rhythm.  Voltage criteria for left ventricular hypertrophy   ECHO 21:  The ]left ventricle is normal in size with mild concentric hypertrophy and mildly decreased systolic function.  EF 45%.                              There is left ventricular global hypokinesis.                              Normal RV size and function.             20 - mild LV dilation. LVEF 40-45%. Nl RV   LHC 2018 - non-obst CAD. LV gram 35-40%    Fhx: Mother -  off heart attack  Shx: Denies smoking, etoh or drug      Lab Results   Component Value Date     10/12/2020    K 3.9 10/12/2020     10/12/2020    CO2 34 (H) 10/12/2020    BUN 6 (L) 10/12/2020    CREATININE 1.060 10/12/2020    CALCIUM 9.0 10/12/2020    ANIONGAP 6 (L) 10/12/2020    ESTGFRAFRICA 95 10/12/2020    EGFRNONAA 79 10/12/2020       Lab Results   Component Value Date    CHOL 200 10/15/2020     Lab Results   Component Value Date    HDL 39 10/15/2020     Lab Results   Component Value Date    LDLCALC 142 10/15/2020     Lab Results   Component Value Date    TRIG 97 10/15/2020     Lab Results   Component Value Date    CHOLHDL 5.1 10/15/2020       Lab Results   Component Value Date    WBC 6.47 10/12/2020    HGB 13.1 (L) 10/12/2020    HCT 42.5 10/12/2020    MCV 88.7 10/12/2020     10/12/2020           Review of Systems   Respiratory:  Negative for cough and shortness of breath.    Cardiovascular:  Negative for chest  "pain, palpitations, orthopnea, claudication, leg swelling and PND.       Objective:   BP (!) 130/90   Pulse 62   Resp 18   Ht 6' 2" (1.88 m)   Wt 120.7 kg (266 lb)   BMI 34.15 kg/m²     Physical Exam  Vitals and nursing note reviewed.   Cardiovascular:      Rate and Rhythm: Normal rate and regular rhythm.      Pulses: Normal pulses.      Heart sounds: Normal heart sounds.   Pulmonary:      Breath sounds: Normal breath sounds.   Neurological:      Mental Status: He is oriented to person, place, and time.         Assessment:     1. Hypertension, unspecified type  EKG 12-lead      2. Non-ischemic cardiomyopathy                Plan:   Non-ischemic cardiomyopathy  non-ischemic. [facility] in 2018 with mild non-obstructive CAD.   - EF recovered from 35-40% to 40/45% on recent echo  - GDMT: coreg 25mg bid, increase lisinorpril to 40mg;   - Device; not indicated  - Repeat ECHO with EF of 45%. Continue managment       Hypertension  On coreg, lisinopirl and amlodipine 5mg qd     Hyperlipidemia  On lipitor 80mg qd      "

## 2022-09-26 ENCOUNTER — OFFICE VISIT (OUTPATIENT)
Dept: CARDIOLOGY | Facility: CLINIC | Age: 52
End: 2022-09-26
Payer: MEDICARE

## 2022-09-26 VITALS
HEIGHT: 74 IN | HEART RATE: 62 BPM | RESPIRATION RATE: 18 BRPM | DIASTOLIC BLOOD PRESSURE: 90 MMHG | SYSTOLIC BLOOD PRESSURE: 130 MMHG | BODY MASS INDEX: 34.14 KG/M2 | WEIGHT: 266 LBS

## 2022-09-26 DIAGNOSIS — I10 HYPERTENSION, UNSPECIFIED TYPE: ICD-10-CM

## 2022-09-26 DIAGNOSIS — I10 HYPERTENSION, UNSPECIFIED TYPE: Primary | ICD-10-CM

## 2022-09-26 DIAGNOSIS — I42.8 NON-ISCHEMIC CARDIOMYOPATHY: ICD-10-CM

## 2022-09-26 PROCEDURE — 1159F MED LIST DOCD IN RCRD: CPT | Mod: CPTII,,, | Performed by: STUDENT IN AN ORGANIZED HEALTH CARE EDUCATION/TRAINING PROGRAM

## 2022-09-26 PROCEDURE — 99214 OFFICE O/P EST MOD 30 MIN: CPT | Mod: S$PBB,,, | Performed by: STUDENT IN AN ORGANIZED HEALTH CARE EDUCATION/TRAINING PROGRAM

## 2022-09-26 PROCEDURE — 3008F PR BODY MASS INDEX (BMI) DOCUMENTED: ICD-10-PCS | Mod: CPTII,,, | Performed by: STUDENT IN AN ORGANIZED HEALTH CARE EDUCATION/TRAINING PROGRAM

## 2022-09-26 PROCEDURE — 3075F SYST BP GE 130 - 139MM HG: CPT | Mod: CPTII,,, | Performed by: STUDENT IN AN ORGANIZED HEALTH CARE EDUCATION/TRAINING PROGRAM

## 2022-09-26 PROCEDURE — 3080F PR MOST RECENT DIASTOLIC BLOOD PRESSURE >= 90 MM HG: ICD-10-PCS | Mod: CPTII,,, | Performed by: STUDENT IN AN ORGANIZED HEALTH CARE EDUCATION/TRAINING PROGRAM

## 2022-09-26 PROCEDURE — 93005 ELECTROCARDIOGRAM TRACING: CPT | Mod: PBBFAC | Performed by: STUDENT IN AN ORGANIZED HEALTH CARE EDUCATION/TRAINING PROGRAM

## 2022-09-26 PROCEDURE — 3008F BODY MASS INDEX DOCD: CPT | Mod: CPTII,,, | Performed by: STUDENT IN AN ORGANIZED HEALTH CARE EDUCATION/TRAINING PROGRAM

## 2022-09-26 PROCEDURE — 93010 ELECTROCARDIOGRAM REPORT: CPT | Mod: S$PBB,,, | Performed by: STUDENT IN AN ORGANIZED HEALTH CARE EDUCATION/TRAINING PROGRAM

## 2022-09-26 PROCEDURE — 4010F PR ACE/ARB THEARPY RXD/TAKEN: ICD-10-PCS | Mod: CPTII,,, | Performed by: STUDENT IN AN ORGANIZED HEALTH CARE EDUCATION/TRAINING PROGRAM

## 2022-09-26 PROCEDURE — 3080F DIAST BP >= 90 MM HG: CPT | Mod: CPTII,,, | Performed by: STUDENT IN AN ORGANIZED HEALTH CARE EDUCATION/TRAINING PROGRAM

## 2022-09-26 PROCEDURE — 99214 PR OFFICE/OUTPT VISIT, EST, LEVL IV, 30-39 MIN: ICD-10-PCS | Mod: S$PBB,,, | Performed by: STUDENT IN AN ORGANIZED HEALTH CARE EDUCATION/TRAINING PROGRAM

## 2022-09-26 PROCEDURE — 1159F PR MEDICATION LIST DOCUMENTED IN MEDICAL RECORD: ICD-10-PCS | Mod: CPTII,,, | Performed by: STUDENT IN AN ORGANIZED HEALTH CARE EDUCATION/TRAINING PROGRAM

## 2022-09-26 PROCEDURE — 4010F ACE/ARB THERAPY RXD/TAKEN: CPT | Mod: CPTII,,, | Performed by: STUDENT IN AN ORGANIZED HEALTH CARE EDUCATION/TRAINING PROGRAM

## 2022-09-26 PROCEDURE — 93010 EKG 12-LEAD: ICD-10-PCS | Mod: S$PBB,,, | Performed by: STUDENT IN AN ORGANIZED HEALTH CARE EDUCATION/TRAINING PROGRAM

## 2022-09-26 PROCEDURE — 3075F PR MOST RECENT SYSTOLIC BLOOD PRESS GE 130-139MM HG: ICD-10-PCS | Mod: CPTII,,, | Performed by: STUDENT IN AN ORGANIZED HEALTH CARE EDUCATION/TRAINING PROGRAM

## 2022-09-26 PROCEDURE — 99214 OFFICE O/P EST MOD 30 MIN: CPT | Mod: PBBFAC | Performed by: STUDENT IN AN ORGANIZED HEALTH CARE EDUCATION/TRAINING PROGRAM

## 2022-09-26 RX ORDER — AMLODIPINE BESYLATE 5 MG/1
5 TABLET ORAL DAILY
Qty: 90 TABLET | Refills: 3 | Status: SHIPPED | OUTPATIENT
Start: 2022-09-26

## 2022-09-26 RX ORDER — LISINOPRIL 40 MG/1
40 TABLET ORAL DAILY
Qty: 90 TABLET | Refills: 3 | Status: SHIPPED | OUTPATIENT
Start: 2022-09-26 | End: 2023-10-06

## 2022-09-26 RX ORDER — CARVEDILOL 25 MG/1
25 TABLET ORAL 2 TIMES DAILY WITH MEALS
Qty: 180 TABLET | Refills: 3 | Status: SHIPPED | OUTPATIENT
Start: 2022-09-26

## 2022-09-26 RX ORDER — NAPROXEN SODIUM 220 MG/1
81 TABLET, FILM COATED ORAL DAILY
Qty: 90 TABLET | Refills: 3 | Status: SHIPPED | OUTPATIENT
Start: 2022-09-26 | End: 2023-10-02

## 2022-09-26 NOTE — ASSESSMENT & PLAN NOTE
non-ischemic. [facility] in 2018 with mild non-obstructive CAD.   - EF recovered from 35-40% to 40/45% on recent echo  - GDMT: coreg 25mg bid, increase lisinorpril to 40mg;   - Device; not indicated  - Repeat ECHO with EF of 45%. Continue managment

## 2022-10-05 ENCOUNTER — HOSPITAL ENCOUNTER (OUTPATIENT)
Facility: HOSPITAL | Age: 52
Discharge: HOME | End: 2022-10-05
Attending: ANESTHESIOLOGY | Admitting: ANESTHESIOLOGY
Payer: MEDICARE

## 2022-10-05 VITALS
SYSTOLIC BLOOD PRESSURE: 142 MMHG | DIASTOLIC BLOOD PRESSURE: 93 MMHG | BODY MASS INDEX: 32.63 KG/M2 | RESPIRATION RATE: 18 BRPM | HEART RATE: 77 BPM | HEIGHT: 76 IN | WEIGHT: 268 LBS

## 2022-10-05 DIAGNOSIS — G89.4 CHRONIC PAIN SYNDROME: ICD-10-CM

## 2022-10-05 PROCEDURE — 62370 ANL SP INF PMP W/MDREPRG&FIL: CPT | Performed by: ANESTHESIOLOGY

## 2022-10-05 NOTE — OP NOTE
Intrathecal pump refill and reprogramming           The patient was identified in the exam room and the pump was interrogated using the Medtronic system.  The pump was noted to have 2.6 cc remaining of a solution of morphine 10 mg per mL.  The patient's abdomen was marked in the appropriate location and prepped and draped in usual sterile fashion.  A 40 mL Medtronic pump refill kit was utilized for this procedure.  After the patient abdomen was prepped and draped in the usual sterile fashion, the Medtronic 40 mL template was placed on the skin overlying the target area of the pump orifice.  A 22-gauge noncoring needle was then utilized to access the pump.  A 20 mL syringe was utilized to aspirate the contents of the pump.Actual removed 3.1cc.   The solution was clear with no signs of serous fluid or blood.  The patient then had a 40 mL allotment of morphine 10 mg per mL injected incrementally using the syringe and filter.  The needle was removed with its tip intact and the patient tolerated the procedure well with no adverse events.  The pump was then reprogrammed using the Medtronic system.

## 2022-10-05 NOTE — DISCHARGE SUMMARY
Patient underwent intrathecal pump refill 10/05/2022 as planned and discharged home. Will follow up in clinic.   Post   dx Chronic pain syndrome

## 2022-10-26 ENCOUNTER — ANESTHESIA EVENT (OUTPATIENT)
Dept: PAIN MEDICINE | Facility: HOSPITAL | Age: 52
End: 2022-10-26
Payer: MEDICARE

## 2022-10-26 ENCOUNTER — ANESTHESIA (OUTPATIENT)
Dept: PAIN MEDICINE | Facility: HOSPITAL | Age: 52
End: 2022-10-26
Payer: MEDICARE

## 2022-10-26 ENCOUNTER — HOSPITAL ENCOUNTER (OUTPATIENT)
Facility: HOSPITAL | Age: 52
Discharge: HOME | End: 2022-10-26
Attending: ANESTHESIOLOGY | Admitting: ANESTHESIOLOGY
Payer: MEDICARE

## 2022-10-26 VITALS
WEIGHT: 262 LBS | RESPIRATION RATE: 10 BRPM | BODY MASS INDEX: 31.9 KG/M2 | TEMPERATURE: 98 F | HEART RATE: 62 BPM | HEIGHT: 76 IN | DIASTOLIC BLOOD PRESSURE: 71 MMHG | SYSTOLIC BLOOD PRESSURE: 135 MMHG | OXYGEN SATURATION: 100 %

## 2022-10-26 DIAGNOSIS — M47.817 LUMBOSACRAL SPONDYLOSIS WITHOUT MYELOPATHY: ICD-10-CM

## 2022-10-26 PROCEDURE — 01992 ANES DX/THER NRV BLK&INJ PRN: CPT | Performed by: ANESTHESIOLOGY

## 2022-10-26 PROCEDURE — D9220A PRA ANESTHESIA: ICD-10-PCS | Mod: ,,, | Performed by: NURSE ANESTHETIST, CERTIFIED REGISTERED

## 2022-10-26 PROCEDURE — 64494 INJ PARAVERT F JNT L/S 2 LEV: CPT | Mod: LT | Performed by: ANESTHESIOLOGY

## 2022-10-26 PROCEDURE — 25000003 PHARM REV CODE 250: Performed by: ANESTHESIOLOGY

## 2022-10-26 PROCEDURE — 37000008 HC ANESTHESIA 1ST 15 MINUTES: Performed by: ANESTHESIOLOGY

## 2022-10-26 PROCEDURE — 25000003 PHARM REV CODE 250: Performed by: NURSE ANESTHETIST, CERTIFIED REGISTERED

## 2022-10-26 PROCEDURE — 63600175 PHARM REV CODE 636 W HCPCS: Performed by: NURSE ANESTHETIST, CERTIFIED REGISTERED

## 2022-10-26 PROCEDURE — 64493 INJ PARAVERT F JNT L/S 1 LEV: CPT | Mod: RT | Performed by: ANESTHESIOLOGY

## 2022-10-26 PROCEDURE — D9220A PRA ANESTHESIA: Mod: ,,, | Performed by: NURSE ANESTHETIST, CERTIFIED REGISTERED

## 2022-10-26 PROCEDURE — 27000284 HC CANNULA NASAL: Performed by: NURSE ANESTHETIST, CERTIFIED REGISTERED

## 2022-10-26 RX ORDER — SODIUM CHLORIDE, SODIUM LACTATE, POTASSIUM CHLORIDE, CALCIUM CHLORIDE 600; 310; 30; 20 MG/100ML; MG/100ML; MG/100ML; MG/100ML
INJECTION, SOLUTION INTRAVENOUS CONTINUOUS PRN
Status: DISCONTINUED | OUTPATIENT
Start: 2022-10-26 | End: 2022-10-26

## 2022-10-26 RX ORDER — BUPIVACAINE HYDROCHLORIDE 2.5 MG/ML
INJECTION, SOLUTION INFILTRATION; PERINEURAL CODE/TRAUMA/SEDATION MEDICATION
Status: DISCONTINUED | OUTPATIENT
Start: 2022-10-26 | End: 2022-10-26 | Stop reason: HOSPADM

## 2022-10-26 RX ORDER — SODIUM CHLORIDE 9 MG/ML
500 INJECTION, SOLUTION INTRAVENOUS CONTINUOUS
Status: DISCONTINUED | OUTPATIENT
Start: 2022-10-26 | End: 2022-10-26 | Stop reason: HOSPADM

## 2022-10-26 RX ORDER — LIDOCAINE HYDROCHLORIDE 20 MG/ML
INJECTION INTRAVENOUS
Status: DISCONTINUED | OUTPATIENT
Start: 2022-10-26 | End: 2022-10-26

## 2022-10-26 RX ORDER — PROPOFOL 10 MG/ML
VIAL (ML) INTRAVENOUS
Status: DISCONTINUED | OUTPATIENT
Start: 2022-10-26 | End: 2022-10-26

## 2022-10-26 RX ADMIN — LIDOCAINE HYDROCHLORIDE 100 MG: 20 INJECTION, SOLUTION INTRAVENOUS at 10:10

## 2022-10-26 RX ADMIN — SODIUM CHLORIDE, POTASSIUM CHLORIDE, SODIUM LACTATE AND CALCIUM CHLORIDE: 600; 310; 30; 20 INJECTION, SOLUTION INTRAVENOUS at 09:10

## 2022-10-26 RX ADMIN — PROPOFOL 200 MG: 10 INJECTION, EMULSION INTRAVENOUS at 10:10

## 2022-10-26 NOTE — DISCHARGE SUMMARY
Patient underwent Bilateral medial branch nerve blocks from L3-5.   [id number]/26/2022. Will follow up in clinic.    Discharge Dx: Lumbosacral spondylosis without myelopathy. Low back pain

## 2022-10-26 NOTE — ANESTHESIA POSTPROCEDURE EVALUATION
Anesthesia Post Evaluation    Patient: Jesse Andrade    Procedure(s) Performed: Procedure(s) (LRB):  BLOCK, NERVE, FACET JOINT, LUMBAR, MEDIAL BRANCH (Bilateral)    Final Anesthesia Type: general      Patient participation: Yes- Able to Participate  Level of consciousness: awake and alert  Post-procedure vital signs: reviewed and stable  Pain management: adequate  Airway patency: patent    PONV status at discharge: No PONV  Anesthetic complications: no      Cardiovascular status: blood pressure returned to baseline and hemodynamically stable  Respiratory status: spontaneous ventilation  Hydration status: euvolemic  Follow-up not needed.  Comments: Pt voices appreciation for care          Vitals Value Taken Time   /71 10/26/22 1040   Temp 98.1 10/26/22 1050   Pulse 63 10/26/22 1042   Resp 15 10/26/22 1042   SpO2 100 % 10/26/22 1041   Vitals shown include unvalidated device data.      Event Time   Out of Recovery 10:41:00         Pain/Alexi Score: Alexi Score: 10 (10/26/2022 10:40 AM)

## 2022-10-26 NOTE — ANESTHESIA PREPROCEDURE EVALUATION
10/26/2022  Jesse Andrade is a 52 y.o., male.      Pre-op Assessment    I have reviewed the Patient Summary Reports.    I have reviewed the NPO Status.   I have reviewed the Medications.     Review of Systems  Anesthesia Hx:  No problems with previous Anesthesia  Denies Family Hx of Anesthesia complications.   Denies Personal Hx of Anesthesia complications.   Social:  Former Smoker, No Alcohol Use    Hematology/Oncology:     Oncology Normal     EENT/Dental:EENT/Dental Normal   Cardiovascular:   Exercise tolerance: good Hypertension hyperlipidemia Non ischemic cardiomyopathy   Pulmonary:  Pulmonary Normal    Renal/:  Renal/ Normal     Musculoskeletal:   Arthritis     Neurological:   Chronic Pain Syndrome   Endocrine:  Endocrine Normal    Dermatological:  Skin Normal    Psych:  Psychiatric Normal           Physical Exam  General: Well nourished, Cooperative, Alert and Oriented    Airway:  Mallampati: II   Mouth Opening: Normal  TM Distance: Normal  Tongue: Normal  Neck ROM: Normal ROM    Dental:  Intact        Anesthesia Plan  Type of Anesthesia, risks & benefits discussed:    Anesthesia Type: Gen Natural Airway  Intra-op Monitoring Plan: Standard ASA Monitors  Post Op Pain Control Plan: multimodal analgesia and IV/PO Opioids PRN  Induction:  IV  Informed Consent: Informed consent signed with the Patient and all parties understand the risks and agree with anesthesia plan.  All questions answered. Patient consented to blood products? Yes  ASA Score: 2  Day of Surgery Review of History & Physical: I have interviewed and examined the patient. I have reviewed the patient's H&P dated: There are no significant changes.     Ready For Surgery From Anesthesia Perspective.     .       Elidel Counseling: Patient may experience a mild burning sensation during topical application. Elidel is not approved in children less than 2 years of age. There have been case reports of hematologic and skin malignancies in patients using topical calcineurin inhibitors although causality is questionable.

## 2022-10-26 NOTE — TRANSFER OF CARE
"Anesthesia Transfer of Care Note    Patient: Jesse Andrade    Procedure(s) Performed: Procedure(s) (LRB):  BLOCK, NERVE, FACET JOINT, LUMBAR, MEDIAL BRANCH (Bilateral)    Patient location: PACU    Anesthesia Type: general    Transport from OR: Transported from OR on room air with adequate spontaneous ventilation    Post pain: adequate analgesia    Post assessment: no apparent anesthetic complications    Post vital signs: stable    Level of consciousness: awake    Nausea/Vomiting: no nausea/vomiting    Complications: none    Transfer of care protocol was followed      Last vitals:   Visit Vitals  /67   Pulse 77   Temp 36.9 °C (98.4 °F)   Resp 18   Ht 6' 4" (1.93 m)   Wt 118.8 kg (262 lb)   SpO2 98%   BMI 31.89 kg/m²     "

## 2022-10-26 NOTE — OP NOTE
PRE-OPERATIVE DIAGNOSIS:   Lumbar spondylosis without Myelopathy         POST-OPERATIVE DIAGNOSIS:   Lumbar spondylosis without Myelopathy       10/26/2022  PROCEDURE:  Bilateral medial branch nerve blocks from L3-5.    SURGEON: Dr. Jaziel Pierson    COMPLICATIONS:  None.    DRAINS AND PACKS:  None.    BLOOD LOSS:  None.    ANESTHESIA:  MAC.         PROCEDURE:  The patient was identified in the holding area. The risks and benefits of the procedure were again explained to the patient and he agreed to proceed.   Patient was taken in stable condition to the operating room where he was placed prone on the C-arm table.  All pressure points were checked and padded comfortably while the patient was awake.  The patient's back was prepped and draped in usual sterile fashion.  Standard ASA monitors were applied and monitored throughout the procedure.  Time out was completed.  Anesthesia was initiated.  The C-arm was brought into the true AP position to identify the vertebral bodies from L1 to S1.  The C-arm was then obliqued in serial fashion and used to identify the eye of the Scottie dog formation.  At the junction between the superior articular process transverse process, a skin wheal was raised using Bupivacaine 0.25% (2.5mg/ml) 1ml  at each targeted area at L3,L4, and L5.  A 22-gauge 3 ½ inch spinal needle was advanced down into the articular surface at each level in serial fashion and was identified.   The stylets were removed.   A 2 cc allotment of  Bupivacaine 0.25% (2.5mg/ml)  was administered.   The patient tolerated the procedure well with no adverse events.  The needle was removed with its tip intact. The procedure was repeated on the right side as described above.  There was adequate hemostasis at the conclusion of procedure.   The patient was taken in stable condition to the holding area where he was monitored for the appropriate time of convalescence and discharged in the care of .         Preoperative  pain score was    9/10    Postoperative pain score was   0/10   100  % PIPA

## 2022-10-26 NOTE — PLAN OF CARE
REFER TO WRITTEN DOCUMENT AND RECOVERY INFORMATION.    D/CD PATIENT VIAA WHEELCHAIR AT 1049.    INFORMED PATIENT IF UNABLE TO VOID IN 8 HOURS, GO TO ER. NOTIFY MD OF REDNESS OR DRAINAGE FROM INJECTION SITE OR FEVER OVER 3-4 DAY. REST AND DRINK PLENTY OF FLUIDS FOR THE REMAINDER OF THE DAY. NO LIFTING OVER 5 LBS FOR THE REMAINDER OF THE DAY. CONTINUE REGULAR MEDICATIONS AS PRESCRIBED. MAY TAKE PAIN MEDICATION AS PRESCRIBED.     PAIN IMPROVED  100%

## 2022-10-26 NOTE — DISCHARGE INSTRUCTIONS
REFER TO WRITTEN DOCUMENT AND RECOVERY INFORMATION.        INFORMED PATIENT IF UNABLE TO VOID IN 8 HOURS, GO TO ER. NOTIFY MD OF REDNESS OR DRAINAGE FROM INJECTION SITE OR FEVER OVER 3-4 DAY. REST AND DRINK PLENTY OF FLUIDS FOR THE REMAINDER OF THE DAY. NO LIFTING OVER 5 LBS FOR THE REMAINDER OF THE DAY. CONTINUE REGULAR MEDICATIONS AS PRESCRIBED. MAY TAKE PAIN MEDICATION AS PRESCRIBED.

## 2022-11-01 ENCOUNTER — CLINICAL SUPPORT (OUTPATIENT)
Dept: FAMILY MEDICINE | Facility: CLINIC | Age: 52
End: 2022-11-01
Payer: MEDICARE

## 2022-11-01 DIAGNOSIS — Z23 NEED FOR PROPHYLACTIC VACCINATION AND INOCULATION AGAINST INFLUENZA: Primary | ICD-10-CM

## 2022-11-01 PROCEDURE — 90686 FLU VACCINE (QUAD) GREATER THAN OR EQUAL TO 3YO PRESERVATIVE FREE IM: ICD-10-PCS | Mod: ,,, | Performed by: FAMILY MEDICINE

## 2022-11-01 PROCEDURE — G0008 ADMIN INFLUENZA VIRUS VAC: HCPCS | Mod: ,,, | Performed by: FAMILY MEDICINE

## 2022-11-01 PROCEDURE — G0008 FLU VACCINE (QUAD) GREATER THAN OR EQUAL TO 3YO PRESERVATIVE FREE IM: ICD-10-PCS | Mod: ,,, | Performed by: FAMILY MEDICINE

## 2022-11-01 PROCEDURE — 90686 IIV4 VACC NO PRSV 0.5 ML IM: CPT | Mod: ,,, | Performed by: FAMILY MEDICINE

## 2022-11-09 DIAGNOSIS — Z71.89 COMPLEX CARE COORDINATION: ICD-10-CM

## 2022-11-15 ENCOUNTER — OFFICE VISIT (OUTPATIENT)
Dept: FAMILY MEDICINE | Facility: CLINIC | Age: 52
End: 2022-11-15
Payer: MEDICARE

## 2022-11-15 VITALS
HEART RATE: 56 BPM | DIASTOLIC BLOOD PRESSURE: 60 MMHG | BODY MASS INDEX: 31.42 KG/M2 | WEIGHT: 258 LBS | OXYGEN SATURATION: 96 % | HEIGHT: 76 IN | SYSTOLIC BLOOD PRESSURE: 130 MMHG | RESPIRATION RATE: 18 BRPM

## 2022-11-15 DIAGNOSIS — Z12.5 SCREENING FOR PROSTATE CANCER: ICD-10-CM

## 2022-11-15 DIAGNOSIS — Z12.11 COLON CANCER SCREENING: ICD-10-CM

## 2022-11-15 DIAGNOSIS — N40.0 ENLARGED PROSTATE: Primary | ICD-10-CM

## 2022-11-15 DIAGNOSIS — E78.5 HYPERLIPIDEMIA, UNSPECIFIED HYPERLIPIDEMIA TYPE: ICD-10-CM

## 2022-11-15 DIAGNOSIS — I10 HYPERTENSION, UNSPECIFIED TYPE: ICD-10-CM

## 2022-11-15 PROCEDURE — 4010F ACE/ARB THERAPY RXD/TAKEN: CPT | Mod: ,,, | Performed by: FAMILY MEDICINE

## 2022-11-15 PROCEDURE — 3078F DIAST BP <80 MM HG: CPT | Mod: ,,, | Performed by: FAMILY MEDICINE

## 2022-11-15 PROCEDURE — 4010F PR ACE/ARB THEARPY RXD/TAKEN: ICD-10-PCS | Mod: ,,, | Performed by: FAMILY MEDICINE

## 2022-11-15 PROCEDURE — 3075F PR MOST RECENT SYSTOLIC BLOOD PRESS GE 130-139MM HG: ICD-10-PCS | Mod: ,,, | Performed by: FAMILY MEDICINE

## 2022-11-15 PROCEDURE — 3075F SYST BP GE 130 - 139MM HG: CPT | Mod: ,,, | Performed by: FAMILY MEDICINE

## 2022-11-15 PROCEDURE — 1160F RVW MEDS BY RX/DR IN RCRD: CPT | Mod: ,,, | Performed by: FAMILY MEDICINE

## 2022-11-15 PROCEDURE — 1159F MED LIST DOCD IN RCRD: CPT | Mod: ,,, | Performed by: FAMILY MEDICINE

## 2022-11-15 PROCEDURE — 1160F PR REVIEW ALL MEDS BY PRESCRIBER/CLIN PHARMACIST DOCUMENTED: ICD-10-PCS | Mod: ,,, | Performed by: FAMILY MEDICINE

## 2022-11-15 PROCEDURE — 3008F BODY MASS INDEX DOCD: CPT | Mod: ,,, | Performed by: FAMILY MEDICINE

## 2022-11-15 PROCEDURE — 99213 OFFICE O/P EST LOW 20 MIN: CPT | Mod: ,,, | Performed by: FAMILY MEDICINE

## 2022-11-15 PROCEDURE — 1159F PR MEDICATION LIST DOCUMENTED IN MEDICAL RECORD: ICD-10-PCS | Mod: ,,, | Performed by: FAMILY MEDICINE

## 2022-11-15 PROCEDURE — 3008F PR BODY MASS INDEX (BMI) DOCUMENTED: ICD-10-PCS | Mod: ,,, | Performed by: FAMILY MEDICINE

## 2022-11-15 PROCEDURE — 99213 PR OFFICE/OUTPT VISIT, EST, LEVL III, 20-29 MIN: ICD-10-PCS | Mod: ,,, | Performed by: FAMILY MEDICINE

## 2022-11-15 PROCEDURE — 3078F PR MOST RECENT DIASTOLIC BLOOD PRESSURE < 80 MM HG: ICD-10-PCS | Mod: ,,, | Performed by: FAMILY MEDICINE

## 2022-11-15 NOTE — PROGRESS NOTES
Jesse Andrade is a 52 y.o. male seen today for increased symptoms of nocturia and decreased urinary stream.  Patient does have a past medical history of BPH and recent follow-up.  He denies any chest pain or shortness of breath but does need follow-up lab work regarding his hypertension and hyperlipidemia.  Patient is also due for his colon cancer screening and agrees to the Cologuard test.      Past Medical History:   Diagnosis Date    Arthritis     Hyperlipidemia     Hypertension      Family History   Problem Relation Age of Onset    Coronary artery disease Mother      Current Outpatient Medications on File Prior to Visit   Medication Sig Dispense Refill    amLODIPine (NORVASC) 5 MG tablet Take 1 tablet (5 mg total) by mouth once daily. 90 tablet 3    aspirin 81 MG Chew Take 1 tablet (81 mg total) by mouth once daily. 90 tablet 3    atorvastatin (LIPITOR) 80 MG tablet Take 80 mg by mouth every evening.      calcium carbonate/vitamin D3 (VITAMIN D-3 ORAL) Take by mouth.      carvediloL (COREG) 25 MG tablet Take 1 tablet (25 mg total) by mouth 2 (two) times daily with meals. 180 tablet 3    cyclobenzaprine (FLEXERIL) 10 MG tablet Take 10 mg by mouth 3 (three) times daily as needed for Muscle spasms.      diclofenac sodium (VOLTAREN) 1 % Gel Apply 2 g topically 4 (four) times daily.      fluticasone propionate (FLONASE) 50 mcg/actuation nasal spray SPRAY TWO (2) SPRAYS INTO  EACH NOSTRIL ONCE DAILY. 16 mL 2    gabapentin (NEURONTIN) 600 MG tablet Take 600 mg by mouth once daily.      lisinopriL (PRINIVIL,ZESTRIL) 40 MG tablet Take 1 tablet (40 mg total) by mouth once daily. 90 tablet 3    oxyCODONE-acetaminophen (PERCOCET)  mg per tablet Take 1 tablet by mouth every 12 (twelve) hours as needed for Pain.      testosterone cypionate (DEPOTESTOTERONE CYPIONATE) 200 mg/mL injection       cholecalciferol, vitamin D3, 1,250 mcg (50,000 unit) capsule       fluticasone propionate (FLONASE) 50 mcg/actuation nasal  spray       [DISCONTINUED] sulfamethoxazole-trimethoprim 800-160mg (BACTRIM DS) 800-160 mg Tab Take 1 tablet by mouth 2 (two) times daily. (Patient not taking: Reported on 11/15/2022) 20 tablet 0     No current facility-administered medications on file prior to visit.     Immunization History   Administered Date(s) Administered    COVID-19, MRNA, LN-S, PF (MODERNA FULL 0.5 ML DOSE) 06/29/2021, 08/06/2021    Influenza - Quadrivalent - PF *Preferred* (6 months and older) 11/01/2022       Review of Systems   HENT:  Negative for hearing loss.    Eyes:  Negative for discharge.   Respiratory:  Negative for wheezing.    Cardiovascular:  Negative for chest pain and palpitations.   Gastrointestinal:  Negative for blood in stool, constipation, diarrhea and vomiting.   Genitourinary:  Positive for frequency. Negative for hematuria and urgency.   Musculoskeletal:  Negative for neck pain.   Neurological:  Negative for weakness and headaches.   Endo/Heme/Allergies:  Negative for polydipsia.      Vitals:    11/15/22 1403   BP: 130/60   Pulse: (!) 56   Resp: 18       Physical Exam  Vitals reviewed.   Constitutional:       Appearance: Normal appearance.   HENT:      Head: Normocephalic.   Eyes:      Extraocular Movements: Extraocular movements intact.      Conjunctiva/sclera: Conjunctivae normal.      Pupils: Pupils are equal, round, and reactive to light.   Neck:      Thyroid: No thyroid mass or thyromegaly.   Cardiovascular:      Rate and Rhythm: Normal rate and regular rhythm.      Heart sounds: Normal heart sounds. No murmur heard.    No gallop.   Pulmonary:      Effort: Pulmonary effort is normal. No respiratory distress.      Breath sounds: Normal breath sounds. No wheezing or rales.   Genitourinary:     Prostate: Enlarged and nodules present. Not tender.   Skin:     General: Skin is warm and dry.      Coloration: Skin is not jaundiced or pale.   Neurological:      Mental Status: He is alert.   Psychiatric:         Mood and  Affect: Mood normal.         Behavior: Behavior normal.         Thought Content: Thought content normal.         Judgment: Judgment normal.        Assessment and Plan  Enlarged prostate  -     Ambulatory referral/consult to Urology; Future; Expected date: 11/22/2022    Screening for prostate cancer  -     PSA, Screening; Future; Expected date: 11/16/2022    Hypertension, unspecified type  -     CBC Auto Differential; Future; Expected date: 11/16/2022  -     Comprehensive Metabolic Panel; Future; Expected date: 11/16/2022    Hyperlipidemia, unspecified hyperlipidemia type  -     Lipid Panel; Future; Expected date: 11/16/2022    Colon cancer screening  -     Cologuard Screening (Multitarget Stool DNA); Future; Expected date: 11/22/2022            Return to clinic in 1 month for follow up after his lab work.    The patient has no Health Maintenance topics of status Not Due      Answers submitted by the patient for this visit:  Review of Systems Questionnaire (Submitted on 11/14/2022)  activity change: No  unexpected weight change: No  rhinorrhea: No  trouble swallowing: No  visual disturbance: No  chest tightness: No  polyuria: Yes  difficulty urinating: Yes  joint swelling: Yes  arthralgias: Yes  confusion: No  dysphoric mood: No

## 2022-11-20 LAB — HEMOCCULT STL QL IA: NEGATIVE

## 2022-12-04 LAB — NONINV COLON CA DNA+OCC BLD SCRN STL QL: NEGATIVE

## 2022-12-05 ENCOUNTER — TELEPHONE (OUTPATIENT)
Dept: FAMILY MEDICINE | Facility: CLINIC | Age: 52
End: 2022-12-05
Payer: MEDICAID

## 2022-12-14 ENCOUNTER — ANESTHESIA EVENT (OUTPATIENT)
Dept: PAIN MEDICINE | Facility: HOSPITAL | Age: 52
End: 2022-12-14
Payer: MEDICARE

## 2022-12-14 ENCOUNTER — HOSPITAL ENCOUNTER (OUTPATIENT)
Facility: HOSPITAL | Age: 52
Discharge: HOME | End: 2022-12-14
Attending: ANESTHESIOLOGY | Admitting: ANESTHESIOLOGY
Payer: MEDICARE

## 2022-12-14 ENCOUNTER — ANESTHESIA (OUTPATIENT)
Dept: PAIN MEDICINE | Facility: HOSPITAL | Age: 52
End: 2022-12-14
Payer: MEDICARE

## 2022-12-14 VITALS
DIASTOLIC BLOOD PRESSURE: 84 MMHG | TEMPERATURE: 98 F | WEIGHT: 257 LBS | SYSTOLIC BLOOD PRESSURE: 129 MMHG | OXYGEN SATURATION: 98 % | BODY MASS INDEX: 31.29 KG/M2 | HEIGHT: 76 IN | HEART RATE: 58 BPM | RESPIRATION RATE: 10 BRPM

## 2022-12-14 DIAGNOSIS — M47.816 LUMBAR SPONDYLOSIS: ICD-10-CM

## 2022-12-14 PROCEDURE — 01940 ANES NULYT AGT LMBR/SAC: CPT | Performed by: ANESTHESIOLOGY

## 2022-12-14 PROCEDURE — 37000009 HC ANESTHESIA EA ADD 15 MINS: Performed by: ANESTHESIOLOGY

## 2022-12-14 PROCEDURE — 63600175 PHARM REV CODE 636 W HCPCS: Performed by: ANESTHESIOLOGY

## 2022-12-14 PROCEDURE — 63600175 PHARM REV CODE 636 W HCPCS: Performed by: NURSE ANESTHETIST, CERTIFIED REGISTERED

## 2022-12-14 PROCEDURE — 25000003 PHARM REV CODE 250: Performed by: NURSE ANESTHETIST, CERTIFIED REGISTERED

## 2022-12-14 PROCEDURE — 37000008 HC ANESTHESIA 1ST 15 MINUTES: Performed by: ANESTHESIOLOGY

## 2022-12-14 PROCEDURE — D9220A PRA ANESTHESIA: Mod: ,,, | Performed by: NURSE ANESTHETIST, CERTIFIED REGISTERED

## 2022-12-14 PROCEDURE — 25000003 PHARM REV CODE 250: Performed by: ANESTHESIOLOGY

## 2022-12-14 PROCEDURE — 64635 DESTROY LUMB/SAC FACET JNT: CPT | Mod: RT | Performed by: ANESTHESIOLOGY

## 2022-12-14 PROCEDURE — 64636 DESTROY L/S FACET JNT ADDL: CPT | Mod: RT | Performed by: ANESTHESIOLOGY

## 2022-12-14 PROCEDURE — 27000284 HC CANNULA NASAL: Performed by: NURSE ANESTHETIST, CERTIFIED REGISTERED

## 2022-12-14 PROCEDURE — D9220A PRA ANESTHESIA: ICD-10-PCS | Mod: ,,, | Performed by: NURSE ANESTHETIST, CERTIFIED REGISTERED

## 2022-12-14 RX ORDER — PROPOFOL 10 MG/ML
VIAL (ML) INTRAVENOUS
Status: DISCONTINUED | OUTPATIENT
Start: 2022-12-14 | End: 2022-12-14

## 2022-12-14 RX ORDER — TRIAMCINOLONE ACETONIDE 40 MG/ML
INJECTION, SUSPENSION INTRA-ARTICULAR; INTRAMUSCULAR CODE/TRAUMA/SEDATION MEDICATION
Status: DISCONTINUED | OUTPATIENT
Start: 2022-12-14 | End: 2022-12-14 | Stop reason: HOSPADM

## 2022-12-14 RX ORDER — SODIUM CHLORIDE 9 MG/ML
500 INJECTION, SOLUTION INTRAVENOUS CONTINUOUS
Status: DISCONTINUED | OUTPATIENT
Start: 2022-12-14 | End: 2022-12-14 | Stop reason: HOSPADM

## 2022-12-14 RX ORDER — ORPHENADRINE CITRATE 30 MG/ML
INJECTION INTRAMUSCULAR; INTRAVENOUS
Status: DISCONTINUED | OUTPATIENT
Start: 2022-12-14 | End: 2022-12-14

## 2022-12-14 RX ORDER — BUPIVACAINE HYDROCHLORIDE 2.5 MG/ML
INJECTION, SOLUTION INFILTRATION; PERINEURAL CODE/TRAUMA/SEDATION MEDICATION
Status: DISCONTINUED | OUTPATIENT
Start: 2022-12-14 | End: 2022-12-14 | Stop reason: HOSPADM

## 2022-12-14 RX ORDER — LIDOCAINE HYDROCHLORIDE 20 MG/ML
INJECTION, SOLUTION EPIDURAL; INFILTRATION; INTRACAUDAL; PERINEURAL
Status: DISCONTINUED | OUTPATIENT
Start: 2022-12-14 | End: 2022-12-14

## 2022-12-14 RX ORDER — FENTANYL CITRATE 50 UG/ML
INJECTION, SOLUTION INTRAMUSCULAR; INTRAVENOUS
Status: DISCONTINUED | OUTPATIENT
Start: 2022-12-14 | End: 2022-12-14

## 2022-12-14 RX ADMIN — FENTANYL CITRATE 100 MCG: 50 INJECTION INTRAMUSCULAR; INTRAVENOUS at 09:12

## 2022-12-14 RX ADMIN — PROPOFOL 50 MG: 10 INJECTION, EMULSION INTRAVENOUS at 09:12

## 2022-12-14 RX ADMIN — SODIUM CHLORIDE: 9 INJECTION, SOLUTION INTRAVENOUS at 09:12

## 2022-12-14 RX ADMIN — ORPHENADRINE CITRATE 60 MG: 30 INJECTION INTRAMUSCULAR; INTRAVENOUS at 09:12

## 2022-12-14 RX ADMIN — LIDOCAINE HYDROCHLORIDE 50 MG: 20 INJECTION, SOLUTION EPIDURAL; INFILTRATION; INTRACAUDAL; PERINEURAL at 09:12

## 2022-12-14 NOTE — DISCHARGE SUMMARY
Patient underwent procedure Right L3 -L5 Radiofrequency Thermocoagulation of the Medial Branch Nerves   12/14/2022. The pt will follow up in clinic. Discharge Dx: Lumbar Spondylosis without Myelopathy,Low Back Pain.

## 2022-12-14 NOTE — ANESTHESIA PREPROCEDURE EVALUATION
12/14/2022  Jesse Andrade is a 52 y.o., male.      Pre-op Assessment    I have reviewed the Patient Summary Reports.     I have reviewed the Nursing Notes. I have reviewed the NPO Status.   I have reviewed the Medications.     Review of Systems  Anesthesia Hx:  No problems with previous Anesthesia  Family Hx of Anesthesia complications:  Personal Hx of Anesthesia complications   Social:  Non-Smoker    Hematology/Oncology:  Hematology Normal   Oncology Normal     EENT/Dental:EENT/Dental Normal   Cardiovascular:   Hypertension hyperlipidemia    Pulmonary:  Pulmonary Normal    Renal/:  Renal/ Normal     Hepatic/GI:  Hepatic/GI Normal    Musculoskeletal:   Arthritis     Neurological:  Neurology Normal    Endocrine:  Endocrine Normal  Obesity / BMI > 30  Dermatological:  Skin Normal    Psych:  Psychiatric Normal           Physical Exam  General: Well nourished, Cooperative, Alert and Oriented    Airway:  Mallampati: II   Mouth Opening: Normal  TM Distance: Normal  Tongue: Normal  Neck ROM: Normal ROM    Chest/Lungs:  Clear to auscultation, Normal Respiratory Rate    Heart:  Rate: Normal  Rhythm: Regular Rhythm    Abdomen:  Normal, Soft        Anesthesia Plan  Type of Anesthesia, risks & benefits discussed:    Anesthesia Type: Gen Natural Airway  Intra-op Monitoring Plan: Standard ASA Monitors  Post Op Pain Control Plan: multimodal analgesia  Induction:  IV  Informed Consent: Informed consent signed with the Patient and all parties understand the risks and agree with anesthesia plan.  All questions answered. Patient consented to blood products? Yes  ASA Score: 3    Ready For Surgery From Anesthesia Perspective.     .

## 2022-12-14 NOTE — OP NOTE
12/14/2022  PREOPERATIVE DIAGNOSIS:         Lumbar Spondylosis without Myelopathy                                                                Low Back Pain                   POSTOPERATIVE DIAGNOSIS:      Lumbar Spondylosis without Myelopathy                                                                Low Back Pain         PROCEDURE:  Right L3 -L5 Radiofrequency Thermocoagulation of the Medial Branch Nerves     Surgeon: Dr. Jaziel Pierson    ANESTHESIA:  MAC                COMPLICATIONS:  None    DRAINS AND PACKS:  None              BLOOD LOSS:  None         The patient was identified in the holding area.  The risks and benefits of the procedure were again explained to the patient and the patient agreed to proceed.  The patient was brought in stable condition to the operating room and placed in the prone position on the C-Arm table.  All pressure points were checked and padded comfortably with the patient awake.  Standard ASA monitors were applied and the patients back was prepped and draped in the usual sterile fashion.  Time out was completed.  Anesthesia was initiated and a skin wheal using Sensorcaine 0.25% ( 2.5mg/ml)  1cc was raised over the target areas on the right side at  L3, L4 and L5.  Under direct fluoroscopic guidance through anesthetized skin a 100 millimeter curved 10mm,18gage active tip radiofrequency thermocoagulation needle was advanced down to the target area at the junction between the superior articular process and transverse process of the corresponding levels.  Stimulation of the medial branch nerve was carried out and was less than 1.0 at all levels and motor was greater than 2.0 at each level.  The patient then has a 1 milliliter allotment of 0.25%Sensorcaine(2.5mg/ml) injected at each level.  The patient then had a lesioning process of 80 degrees celsius for 105 five second times two runs.  The patient then through each cannula received a 1 milliliter allotment of a solution that  contained  Kenalog 40mg/ml 1ml diluted in 9 milliliters Sensorcaine 0.25% (2.5mg/ml).  The stylettes were removed with the tips intact.  There was adequate hemostasis at the conclusion of the procedure. The patient tolerated the procedure well with no adverse events and no complications. The patient was taken in stable condition to the holding area and monitored for the appropriate time of convalescence.           Preoperative pain score was      10/10.      Postoperative pain score was    0/10

## 2022-12-14 NOTE — TRANSFER OF CARE
"Anesthesia Transfer of Care Note    Patient: Jesse Andrade    Procedure(s) Performed: Procedure(s) (LRB):  RADIOFREQUENCY ABLATION, NERVE, SPINAL, LUMBAR, MEDIAL BRANCH, 1 LEVEL (Right)    Patient location: PACU    Anesthesia Type: general    Transport from OR: Transported from OR on room air with adequate spontaneous ventilation    Post pain: adequate analgesia    Post assessment: no apparent anesthetic complications    Post vital signs: stable    Level of consciousness: responds to stimulation    Nausea/Vomiting: no nausea/vomiting    Complications: none    Transfer of care protocol was followed      Last vitals:   Visit Vitals  /83   Pulse 71   Temp 36.7 °C (98 °F)   Resp 13   Ht 6' 4" (1.93 m)   Wt 116.6 kg (257 lb)   SpO2 (!) 94%   BMI 31.28 kg/m²     "

## 2022-12-14 NOTE — ANESTHESIA POSTPROCEDURE EVALUATION
Anesthesia Post Evaluation    Patient: Jesse Andrade    Procedure(s) Performed: Procedure(s) (LRB):  RADIOFREQUENCY ABLATION, NERVE, SPINAL, LUMBAR, MEDIAL BRANCH, 1 LEVEL (Right)    Final Anesthesia Type: general      Patient location during evaluation: GI PACU  Patient participation: Yes- Able to Participate  Level of consciousness: awake and alert  Post-procedure vital signs: reviewed and stable  Pain management: adequate  Airway patency: patent  THEO mitigation strategies: Multimodal analgesia  PONV status at discharge: No PONV  Anesthetic complications: no      Cardiovascular status: blood pressure returned to baseline  Respiratory status: unassisted  Hydration status: euvolemic  Follow-up not needed.          Vitals Value Taken Time   /84 12/14/22 1005   Temp 98f 12/14/22 1023   Pulse 58 12/14/22 1005   Resp 10 12/14/22 1005   SpO2 98 % 12/14/22 1005         Event Time   Out of Recovery 10:05:00         Pain/Alexi Score: Alexi Score: 10 (12/14/2022 10:05 AM)

## 2022-12-14 NOTE — PLAN OF CARE
Plan:  D/c pt via wheelchair at 1010  Informed pt if does not void in 8 hours to go to ER. Notify if redness, drainage, from injection site or fever over next 3-4 days. Rest and drink plenty of fluids for the remainder of the day. No lifting over 5 lbs. For the remainder of the day. Continue regular medications as prescribed. May take pain medications as prescribed.     Pain improved 100%

## 2023-01-13 ENCOUNTER — TELEPHONE (OUTPATIENT)
Dept: FAMILY MEDICINE | Facility: CLINIC | Age: 53
End: 2023-01-13
Payer: MEDICARE

## 2023-02-14 ENCOUNTER — OFFICE VISIT (OUTPATIENT)
Dept: FAMILY MEDICINE | Facility: CLINIC | Age: 53
End: 2023-02-14
Payer: MEDICARE

## 2023-02-14 VITALS
HEIGHT: 76 IN | HEART RATE: 78 BPM | RESPIRATION RATE: 16 BRPM | WEIGHT: 244 LBS | SYSTOLIC BLOOD PRESSURE: 138 MMHG | OXYGEN SATURATION: 94 % | BODY MASS INDEX: 29.71 KG/M2 | DIASTOLIC BLOOD PRESSURE: 88 MMHG

## 2023-02-14 DIAGNOSIS — I10 HYPERTENSION, UNSPECIFIED TYPE: ICD-10-CM

## 2023-02-14 DIAGNOSIS — E78.5 HYPERLIPIDEMIA, UNSPECIFIED HYPERLIPIDEMIA TYPE: Primary | ICD-10-CM

## 2023-02-14 PROCEDURE — 3075F PR MOST RECENT SYSTOLIC BLOOD PRESS GE 130-139MM HG: ICD-10-PCS | Mod: ,,, | Performed by: FAMILY MEDICINE

## 2023-02-14 PROCEDURE — 99214 PR OFFICE/OUTPT VISIT, EST, LEVL IV, 30-39 MIN: ICD-10-PCS | Mod: ,,, | Performed by: FAMILY MEDICINE

## 2023-02-14 PROCEDURE — 1159F MED LIST DOCD IN RCRD: CPT | Mod: ,,, | Performed by: FAMILY MEDICINE

## 2023-02-14 PROCEDURE — 3075F SYST BP GE 130 - 139MM HG: CPT | Mod: ,,, | Performed by: FAMILY MEDICINE

## 2023-02-14 PROCEDURE — 3008F BODY MASS INDEX DOCD: CPT | Mod: ,,, | Performed by: FAMILY MEDICINE

## 2023-02-14 PROCEDURE — 1160F PR REVIEW ALL MEDS BY PRESCRIBER/CLIN PHARMACIST DOCUMENTED: ICD-10-PCS | Mod: ,,, | Performed by: FAMILY MEDICINE

## 2023-02-14 PROCEDURE — 3008F PR BODY MASS INDEX (BMI) DOCUMENTED: ICD-10-PCS | Mod: ,,, | Performed by: FAMILY MEDICINE

## 2023-02-14 PROCEDURE — 1159F PR MEDICATION LIST DOCUMENTED IN MEDICAL RECORD: ICD-10-PCS | Mod: ,,, | Performed by: FAMILY MEDICINE

## 2023-02-14 PROCEDURE — 3079F PR MOST RECENT DIASTOLIC BLOOD PRESSURE 80-89 MM HG: ICD-10-PCS | Mod: ,,, | Performed by: FAMILY MEDICINE

## 2023-02-14 PROCEDURE — 1160F RVW MEDS BY RX/DR IN RCRD: CPT | Mod: ,,, | Performed by: FAMILY MEDICINE

## 2023-02-14 PROCEDURE — 3079F DIAST BP 80-89 MM HG: CPT | Mod: ,,, | Performed by: FAMILY MEDICINE

## 2023-02-14 PROCEDURE — 99214 OFFICE O/P EST MOD 30 MIN: CPT | Mod: ,,, | Performed by: FAMILY MEDICINE

## 2023-02-14 RX ORDER — ROSUVASTATIN CALCIUM 40 MG/1
40 TABLET, COATED ORAL NIGHTLY
Qty: 90 TABLET | Refills: 1 | Status: SHIPPED | OUTPATIENT
Start: 2023-02-14 | End: 2023-05-15

## 2023-02-14 NOTE — PROGRESS NOTES
Jesse Andrade is a 52 y.o. male seen today for follow-up on his recent lab work.  Unfortunately his LDL cholesterol is markedly elevated and the patient reports he has not been taking his cholesterol medication.  Reports the Lipitor 80 mg tablet is too large for him to swallow easily.  We discussed switching him to a smaller tablet of Crestor 40 mg and rechecking his lipids in approximately 3 months.    Past Medical History:   Diagnosis Date    Arthritis     Hyperlipidemia     Hypertension      Family History   Problem Relation Age of Onset    Coronary artery disease Mother      Current Outpatient Medications on File Prior to Visit   Medication Sig Dispense Refill    amLODIPine (NORVASC) 5 MG tablet Take 1 tablet (5 mg total) by mouth once daily. 90 tablet 3    aspirin 81 MG Chew Take 1 tablet (81 mg total) by mouth once daily. 90 tablet 3    carvediloL (COREG) 25 MG tablet Take 1 tablet (25 mg total) by mouth 2 (two) times daily with meals. 180 tablet 3    cyclobenzaprine (FLEXERIL) 10 MG tablet Take 10 mg by mouth 3 (three) times daily as needed for Muscle spasms.      diclofenac sodium (VOLTAREN) 1 % Gel Apply 2 g topically 4 (four) times daily.      gabapentin (NEURONTIN) 600 MG tablet Take 600 mg by mouth once daily.      lisinopriL (PRINIVIL,ZESTRIL) 40 MG tablet Take 1 tablet (40 mg total) by mouth once daily. 90 tablet 3    oxyCODONE-acetaminophen (PERCOCET)  mg per tablet Take 1 tablet by mouth every 12 (twelve) hours as needed for Pain.      testosterone cypionate (DEPOTESTOTERONE CYPIONATE) 200 mg/mL injection       [DISCONTINUED] atorvastatin (LIPITOR) 80 MG tablet Take 80 mg by mouth every evening.      calcium carbonate/vitamin D3 (VITAMIN D-3 ORAL) Take by mouth.       No current facility-administered medications on file prior to visit.     Immunization History   Administered Date(s) Administered    COVID-19, MRNA, LN-S, PF (MODERNA FULL 0.5 ML DOSE) 06/29/2021, 08/06/2021    Influenza -  Quadrivalent - PF *Preferred* (6 months and older) 11/01/2022       Review of Systems   Constitutional:  Negative for fever, malaise/fatigue and weight loss.   Respiratory:  Negative for shortness of breath.    Cardiovascular:  Negative for chest pain and palpitations.   Gastrointestinal:  Negative for nausea and vomiting.   Psychiatric/Behavioral:  Negative for depression.       Vitals:    02/14/23 1323   BP: 138/88   Pulse: 78   Resp: 16       Physical Exam  Eyes:      Conjunctiva/sclera: Conjunctivae normal.   Pulmonary:      Effort: Pulmonary effort is normal.   Neurological:      Mental Status: He is alert.   Psychiatric:         Mood and Affect: Mood normal.         Behavior: Behavior normal.         Thought Content: Thought content normal.         Judgment: Judgment normal.        Assessment and Plan  Hyperlipidemia, unspecified hyperlipidemia type  -     rosuvastatin (CRESTOR) 40 MG Tab; Take 1 tablet (40 mg total) by mouth every evening.  Dispense: 90 tablet; Refill: 1    Hypertension, unspecified type            Return to clinic in 3 months for follow-up blood work and then an office visit or as needed.  Side effects of Crestor were reviewed with the patient.    Health Maintenance Topics with due status: Not Due       Topic Last Completion Date    Hemoglobin A1c (Diabetic Prevention Screening) 10/15/2020    Colorectal Cancer Screening 11/28/2022    Lipid Panel 01/11/2023

## 2023-03-29 ENCOUNTER — HOSPITAL ENCOUNTER (OUTPATIENT)
Facility: HOSPITAL | Age: 53
Discharge: HOME | End: 2023-03-29
Attending: ANESTHESIOLOGY | Admitting: ANESTHESIOLOGY
Payer: MEDICARE

## 2023-03-29 VITALS
HEART RATE: 67 BPM | DIASTOLIC BLOOD PRESSURE: 90 MMHG | RESPIRATION RATE: 12 BRPM | WEIGHT: 253 LBS | HEIGHT: 76 IN | TEMPERATURE: 98 F | BODY MASS INDEX: 30.81 KG/M2 | OXYGEN SATURATION: 98 % | SYSTOLIC BLOOD PRESSURE: 152 MMHG

## 2023-03-29 DIAGNOSIS — G89.4 CHRONIC PAIN SYNDROME: ICD-10-CM

## 2023-03-29 PROCEDURE — 63600175 PHARM REV CODE 636 W HCPCS

## 2023-03-29 PROCEDURE — 62370 ANL SP INF PMP W/MDREPRG&FIL: CPT | Performed by: ANESTHESIOLOGY

## 2023-03-29 NOTE — DISCHARGE SUMMARY
Patient underwent Intrathecal pump refill and reprogramming  procedure 03/29/2023. The pt will follow up in clinic. Discharge Dx:Chronic pain syndrome.

## 2023-03-29 NOTE — OP NOTE
03/29/2023  PREOPERATIVE DIAGNOSIS:     Chronic pain syndrome         POSTOPERATIVE DIAGNOSIS:  Chronic pain syndrome                                                            PROCEDURE:  Intrathecal pump refill and reprogramming       SURGEON: Dr Jaziel Pierson            The patient was identified in the exam room and the pump was interrogated using the Medtronic system.  The pump was noted to have 2.6 cc remaining of a solution of morphine 10 mg per mL.  The patient's abdomen was marked in the appropriate location and prepped and draped in usual sterile fashion.  A 40 mL Medtronic pump refill kit was utilized for this procedure.  After the patient abdomen was prepped and draped in the usual sterile fashion, the Medtronic 40 mL template was placed on the skin overlying the target area of the pump orifice.  A 22-gauge noncoring needle was then utilized to access the pump.  A 20 mL syringe was utilized to aspirate the contents of the pump. Actual removed 4.0cc.   The solution was clear with no signs of serous fluid or blood. The patient then had a 40 mL allotment of morphine 10 mg per mL injected incrementally using the syringe and filter.  The needle was removed with its tip intact and the patient tolerated the procedure well with no adverse events.  The pump was then reprogrammed using the Medtronic system. Next fill on/before 09/22/2023

## 2023-05-15 ENCOUNTER — OFFICE VISIT (OUTPATIENT)
Dept: FAMILY MEDICINE | Facility: CLINIC | Age: 53
End: 2023-05-15
Payer: MEDICARE

## 2023-05-15 VITALS
DIASTOLIC BLOOD PRESSURE: 90 MMHG | WEIGHT: 253 LBS | OXYGEN SATURATION: 97 % | BODY MASS INDEX: 30.81 KG/M2 | SYSTOLIC BLOOD PRESSURE: 126 MMHG | TEMPERATURE: 98 F | RESPIRATION RATE: 18 BRPM | HEIGHT: 76 IN | HEART RATE: 63 BPM

## 2023-05-15 DIAGNOSIS — J40 BRONCHITIS: Primary | ICD-10-CM

## 2023-05-15 DIAGNOSIS — R53.83 FATIGUE, UNSPECIFIED TYPE: ICD-10-CM

## 2023-05-15 DIAGNOSIS — J02.9 SORE THROAT: ICD-10-CM

## 2023-05-15 DIAGNOSIS — R50.9 FEVER, UNSPECIFIED FEVER CAUSE: ICD-10-CM

## 2023-05-15 DIAGNOSIS — R05.9 COUGH, UNSPECIFIED TYPE: ICD-10-CM

## 2023-05-15 DIAGNOSIS — E78.5 HYPERLIPIDEMIA, UNSPECIFIED HYPERLIPIDEMIA TYPE: ICD-10-CM

## 2023-05-15 PROCEDURE — 3008F BODY MASS INDEX DOCD: CPT | Mod: ,,, | Performed by: FAMILY MEDICINE

## 2023-05-15 PROCEDURE — 4010F PR ACE/ARB THEARPY RXD/TAKEN: ICD-10-PCS | Mod: ,,, | Performed by: FAMILY MEDICINE

## 2023-05-15 PROCEDURE — 3080F DIAST BP >= 90 MM HG: CPT | Mod: ,,, | Performed by: FAMILY MEDICINE

## 2023-05-15 PROCEDURE — 1159F PR MEDICATION LIST DOCUMENTED IN MEDICAL RECORD: ICD-10-PCS | Mod: ,,, | Performed by: FAMILY MEDICINE

## 2023-05-15 PROCEDURE — 3008F PR BODY MASS INDEX (BMI) DOCUMENTED: ICD-10-PCS | Mod: ,,, | Performed by: FAMILY MEDICINE

## 2023-05-15 PROCEDURE — 3080F PR MOST RECENT DIASTOLIC BLOOD PRESSURE >= 90 MM HG: ICD-10-PCS | Mod: ,,, | Performed by: FAMILY MEDICINE

## 2023-05-15 PROCEDURE — 96372 PR INJECTION,THERAP/PROPH/DIAG2ST, IM OR SUBCUT: ICD-10-PCS | Mod: ,,, | Performed by: FAMILY MEDICINE

## 2023-05-15 PROCEDURE — 1160F RVW MEDS BY RX/DR IN RCRD: CPT | Mod: ,,, | Performed by: FAMILY MEDICINE

## 2023-05-15 PROCEDURE — 3074F PR MOST RECENT SYSTOLIC BLOOD PRESSURE < 130 MM HG: ICD-10-PCS | Mod: ,,, | Performed by: FAMILY MEDICINE

## 2023-05-15 PROCEDURE — 1159F MED LIST DOCD IN RCRD: CPT | Mod: ,,, | Performed by: FAMILY MEDICINE

## 2023-05-15 PROCEDURE — 99213 OFFICE O/P EST LOW 20 MIN: CPT | Mod: 25,,, | Performed by: FAMILY MEDICINE

## 2023-05-15 PROCEDURE — 1160F PR REVIEW ALL MEDS BY PRESCRIBER/CLIN PHARMACIST DOCUMENTED: ICD-10-PCS | Mod: ,,, | Performed by: FAMILY MEDICINE

## 2023-05-15 PROCEDURE — 4010F ACE/ARB THERAPY RXD/TAKEN: CPT | Mod: ,,, | Performed by: FAMILY MEDICINE

## 2023-05-15 PROCEDURE — 87428 SARSCOV & INF VIR A&B AG IA: CPT | Mod: QW,,, | Performed by: FAMILY MEDICINE

## 2023-05-15 PROCEDURE — 87428 POCT SARS-COV2 (COVID) WITH FLU ANTIGEN: ICD-10-PCS | Mod: QW,,, | Performed by: FAMILY MEDICINE

## 2023-05-15 PROCEDURE — 3074F SYST BP LT 130 MM HG: CPT | Mod: ,,, | Performed by: FAMILY MEDICINE

## 2023-05-15 PROCEDURE — 99213 PR OFFICE/OUTPT VISIT, EST, LEVL III, 20-29 MIN: ICD-10-PCS | Mod: 25,,, | Performed by: FAMILY MEDICINE

## 2023-05-15 PROCEDURE — 96372 THER/PROPH/DIAG INJ SC/IM: CPT | Mod: ,,, | Performed by: FAMILY MEDICINE

## 2023-05-15 RX ORDER — OXYCODONE AND ACETAMINOPHEN 10; 325 MG/1; MG/1
10 TABLET ORAL 2 TIMES DAILY
COMMUNITY
End: 2023-05-15 | Stop reason: SDUPTHER

## 2023-05-15 RX ORDER — LINCOMYCIN HYDROCHLORIDE 300 MG/ML
600 INJECTION, SOLUTION INTRAMUSCULAR; INTRAVENOUS; SUBCONJUNCTIVAL
Status: COMPLETED | OUTPATIENT
Start: 2023-05-15 | End: 2023-05-15

## 2023-05-15 RX ORDER — MORPHINE SULFATE 4 MG/ML
4 INJECTION INTRAVENOUS EVERY 4 HOURS
COMMUNITY

## 2023-05-15 RX ORDER — CYCLOBENZAPRINE HCL 5 MG
5 TABLET ORAL
COMMUNITY
End: 2023-05-15 | Stop reason: SDUPTHER

## 2023-05-15 RX ORDER — AZITHROMYCIN 250 MG/1
TABLET, FILM COATED ORAL
Qty: 6 TABLET | Refills: 0 | Status: SHIPPED | OUTPATIENT
Start: 2023-05-15 | End: 2023-05-20

## 2023-05-15 RX ORDER — FLUTICASONE PROPIONATE 50 MCG
1-2 SPRAY, SUSPENSION (ML) NASAL DAILY
COMMUNITY

## 2023-05-15 RX ORDER — NAPROXEN SODIUM 220 MG/1
81 TABLET, FILM COATED ORAL DAILY
COMMUNITY
End: 2023-05-15 | Stop reason: SDUPTHER

## 2023-05-15 RX ORDER — ALBUTEROL SULFATE 90 UG/1
2 AEROSOL, METERED RESPIRATORY (INHALATION) EVERY 6 HOURS PRN
Qty: 18 G | Refills: 1 | Status: SHIPPED | OUTPATIENT
Start: 2023-05-15 | End: 2024-05-14

## 2023-05-15 RX ORDER — ATORVASTATIN CALCIUM 40 MG/1
80 TABLET, FILM COATED ORAL NIGHTLY
Qty: 180 TABLET | Refills: 3 | Status: SHIPPED | OUTPATIENT
Start: 2023-05-15 | End: 2024-05-14

## 2023-05-15 RX ADMIN — LINCOMYCIN HYDROCHLORIDE 600 MG: 300 INJECTION, SOLUTION INTRAMUSCULAR; INTRAVENOUS; SUBCONJUNCTIVAL at 03:05

## 2023-05-15 NOTE — PROGRESS NOTES
Jesse Andrade is a 53 y.o. male seen today for follow-up on his hyperlipidemia.  Unfortunately, patient did not tolerate the Crestor and discontinued the medication after 1 week.  He reports myalgias from the medication.  Initially he had been able to tolerate atorvastatin but could not swallow the large 80 mg tablets.  Now, we decided to try to 40 mg tablets and see if the patient can tolerate these.  Patient is also complaining of a 5 day history of worsening nasal congestion postnasal drainage chest congestion and cough.  So far he has been afebrile.  Patient's COVID testing was negative.      Past Medical History:   Diagnosis Date    Arthritis     Hyperlipidemia     Hypertension      Family History   Problem Relation Age of Onset    Coronary artery disease Mother      Current Outpatient Medications on File Prior to Visit   Medication Sig Dispense Refill    amLODIPine (NORVASC) 5 MG tablet Take 1 tablet (5 mg total) by mouth once daily. 90 tablet 3    aspirin 81 MG Chew Take 1 tablet (81 mg total) by mouth once daily. 90 tablet 3    carvediloL (COREG) 25 MG tablet Take 1 tablet (25 mg total) by mouth 2 (two) times daily with meals. 180 tablet 3    cyclobenzaprine (FLEXERIL) 10 MG tablet Take 10 mg by mouth 3 (three) times daily as needed for Muscle spasms.      diclofenac sodium (VOLTAREN) 1 % Gel Apply 2 g topically 4 (four) times daily.      fluticasone propionate (FLONASE) 50 mcg/actuation nasal spray 1-2 sprays by Nasal route once daily.      gabapentin (NEURONTIN) 600 MG tablet Take 600 mg by mouth once daily.      lisinopriL (PRINIVIL,ZESTRIL) 40 MG tablet Take 1 tablet (40 mg total) by mouth once daily. 90 tablet 3    morphine 4 mg/mL Soln injection Inject 4 mg into the vein every 4 (four) hours.      oxyCODONE-acetaminophen (PERCOCET)  mg per tablet Take 1 tablet by mouth every 12 (twelve) hours as needed for Pain.      testosterone cypionate (DEPOTESTOTERONE CYPIONATE) 200 mg/mL injection        [DISCONTINUED] rosuvastatin (CRESTOR) 40 MG Tab Take 1 tablet (40 mg total) by mouth every evening. 90 tablet 1    aspirin 81 MG Chew Take 81 mg by mouth once daily.      cyclobenzaprine (FLEXERIL) 5 MG tablet Take 5 mg by mouth as needed.      oxyCODONE-acetaminophen (PERCOCET)  mg per tablet Take 10 mg by mouth 2 (two) times daily.       No current facility-administered medications on file prior to visit.     Immunization History   Administered Date(s) Administered    COVID-19, MRNA, LN-S, PF (MODERNA FULL 0.5 ML DOSE) 06/29/2021, 08/06/2021    Influenza - Quadrivalent - PF *Preferred* (6 months and older) 11/01/2022       Review of Systems   Constitutional:  Negative for fever, malaise/fatigue and weight loss.   HENT:  Positive for congestion.    Respiratory:  Positive for cough, sputum production and shortness of breath.    Cardiovascular:  Negative for chest pain and palpitations.   Gastrointestinal:  Negative for nausea and vomiting.   Psychiatric/Behavioral:  Negative for depression.       Vitals:    05/15/23 1405   BP: (!) 126/90   Pulse: 63   Resp: 18   Temp: 98.4 °F (36.9 °C)       Physical Exam  Vitals reviewed.   Constitutional:       Appearance: Normal appearance.   HENT:      Head: Normocephalic.   Eyes:      Extraocular Movements: Extraocular movements intact.      Conjunctiva/sclera: Conjunctivae normal.      Pupils: Pupils are equal, round, and reactive to light.   Neck:      Thyroid: No thyroid mass or thyromegaly.   Cardiovascular:      Rate and Rhythm: Normal rate and regular rhythm.      Heart sounds: Normal heart sounds. No murmur heard.    No gallop.   Pulmonary:      Effort: Pulmonary effort is normal. No respiratory distress.      Breath sounds: Decreased air movement present. Wheezing and rhonchi present. No rales.   Skin:     General: Skin is warm and dry.      Coloration: Skin is not jaundiced or pale.   Neurological:      Mental Status: He is alert.   Psychiatric:         Mood and  Affect: Mood normal.         Behavior: Behavior normal.         Thought Content: Thought content normal.         Judgment: Judgment normal.        Assessment and Plan  Bronchitis  -     azithromycin (Z-CHINYERE) 250 MG tablet; Take 2 tablets by mouth on day 1; Take 1 tablet by mouth on days 2-5  Dispense: 6 tablet; Refill: 0  -     lincomycin injection 600 mg  -     albuterol (PROAIR HFA) 90 mcg/actuation inhaler; Inhale 2 puffs into the lungs every 6 (six) hours as needed for Wheezing. Rescue  Dispense: 18 g; Refill: 1    Fatigue, unspecified type  -     POCT SARS-COV2 (COVID) with Flu Antigen    Cough, unspecified type  -     POCT SARS-COV2 (COVID) with Flu Antigen    Fever, unspecified fever cause  -     POCT SARS-COV2 (COVID) with Flu Antigen    Sore throat  -     POCT SARS-COV2 (COVID) with Flu Antigen    Hyperlipidemia, unspecified hyperlipidemia type  -     atorvastatin (LIPITOR) 40 MG tablet; Take 2 tablets (80 mg total) by mouth every evening.  Dispense: 180 tablet; Refill: 3            Return to clinic in 5 days for follow-up or as needed if symptoms worsen or to the ER for severe symptoms.  We will plan on rechecking his lipids in 3 months.    Health Maintenance Topics with due status: Not Due       Topic Last Completion Date    Hemoglobin A1c (Diabetic Prevention Screening) 10/15/2020    Colorectal Cancer Screening 11/28/2022    Lipid Panel 01/11/2023

## 2023-05-25 ENCOUNTER — PATIENT OUTREACH (OUTPATIENT)
Dept: ADMINISTRATIVE | Facility: HOSPITAL | Age: 53
End: 2023-05-25

## 2023-06-07 ENCOUNTER — ANESTHESIA EVENT (OUTPATIENT)
Dept: PAIN MEDICINE | Facility: HOSPITAL | Age: 53
End: 2023-06-07
Payer: MEDICARE

## 2023-06-07 ENCOUNTER — HOSPITAL ENCOUNTER (OUTPATIENT)
Facility: HOSPITAL | Age: 53
Discharge: HOME | End: 2023-06-07
Attending: ANESTHESIOLOGY | Admitting: ANESTHESIOLOGY
Payer: MEDICARE

## 2023-06-07 ENCOUNTER — ANESTHESIA (OUTPATIENT)
Dept: PAIN MEDICINE | Facility: HOSPITAL | Age: 53
End: 2023-06-07
Payer: MEDICARE

## 2023-06-07 VITALS
DIASTOLIC BLOOD PRESSURE: 86 MMHG | TEMPERATURE: 99 F | HEART RATE: 71 BPM | BODY MASS INDEX: 29.83 KG/M2 | OXYGEN SATURATION: 98 % | SYSTOLIC BLOOD PRESSURE: 134 MMHG | HEIGHT: 76 IN | WEIGHT: 245 LBS | RESPIRATION RATE: 10 BRPM

## 2023-06-07 DIAGNOSIS — M54.12 CERVICAL RADICULOPATHY: ICD-10-CM

## 2023-06-07 PROCEDURE — 62321 NJX INTERLAMINAR CRV/THRC: CPT | Performed by: ANESTHESIOLOGY

## 2023-06-07 PROCEDURE — 37000008 HC ANESTHESIA 1ST 15 MINUTES: Performed by: ANESTHESIOLOGY

## 2023-06-07 PROCEDURE — 25500020 PHARM REV CODE 255: Performed by: ANESTHESIOLOGY

## 2023-06-07 PROCEDURE — 63600175 PHARM REV CODE 636 W HCPCS: Performed by: ANESTHESIOLOGY

## 2023-06-07 PROCEDURE — D9220A PRA ANESTHESIA: ICD-10-PCS | Mod: ,,, | Performed by: NURSE ANESTHETIST, CERTIFIED REGISTERED

## 2023-06-07 PROCEDURE — 25000003 PHARM REV CODE 250: Performed by: NURSE ANESTHETIST, CERTIFIED REGISTERED

## 2023-06-07 PROCEDURE — D9220A PRA ANESTHESIA: Mod: ,,, | Performed by: NURSE ANESTHETIST, CERTIFIED REGISTERED

## 2023-06-07 PROCEDURE — 63600175 PHARM REV CODE 636 W HCPCS: Performed by: NURSE ANESTHETIST, CERTIFIED REGISTERED

## 2023-06-07 PROCEDURE — 25000003 PHARM REV CODE 250: Performed by: ANESTHESIOLOGY

## 2023-06-07 RX ORDER — BUPIVACAINE HYDROCHLORIDE 2.5 MG/ML
INJECTION, SOLUTION INFILTRATION; PERINEURAL CODE/TRAUMA/SEDATION MEDICATION
Status: DISCONTINUED | OUTPATIENT
Start: 2023-06-07 | End: 2023-06-07 | Stop reason: HOSPADM

## 2023-06-07 RX ORDER — IOPAMIDOL 612 MG/ML
INJECTION, SOLUTION INTRATHECAL CODE/TRAUMA/SEDATION MEDICATION
Status: DISCONTINUED | OUTPATIENT
Start: 2023-06-07 | End: 2023-06-07 | Stop reason: HOSPADM

## 2023-06-07 RX ORDER — TRIAMCINOLONE ACETONIDE 40 MG/ML
INJECTION, SUSPENSION INTRA-ARTICULAR; INTRAMUSCULAR CODE/TRAUMA/SEDATION MEDICATION
Status: DISCONTINUED | OUTPATIENT
Start: 2023-06-07 | End: 2023-06-07 | Stop reason: HOSPADM

## 2023-06-07 RX ORDER — PROPOFOL 10 MG/ML
VIAL (ML) INTRAVENOUS
Status: DISCONTINUED | OUTPATIENT
Start: 2023-06-07 | End: 2023-06-07

## 2023-06-07 RX ORDER — SODIUM CHLORIDE 9 MG/ML
500 INJECTION, SOLUTION INTRAVENOUS CONTINUOUS
Status: DISCONTINUED | OUTPATIENT
Start: 2023-06-07 | End: 2023-06-07 | Stop reason: HOSPADM

## 2023-06-07 RX ORDER — LIDOCAINE HYDROCHLORIDE 20 MG/ML
INJECTION INTRAVENOUS
Status: DISCONTINUED | OUTPATIENT
Start: 2023-06-07 | End: 2023-06-07

## 2023-06-07 RX ADMIN — PROPOFOL 100 MG: 10 INJECTION, EMULSION INTRAVENOUS at 01:06

## 2023-06-07 RX ADMIN — LIDOCAINE HYDROCHLORIDE 100 MG: 20 INJECTION, SOLUTION INTRAVENOUS at 01:06

## 2023-06-07 NOTE — ANESTHESIA POSTPROCEDURE EVALUATION
Anesthesia Post Evaluation    Patient: Jesse Andrade    Procedure(s) Performed: Procedure(s) (LRB):  INJECTION, STEROID, SPINE, CERVICAL, EPIDURAL (Right)    Final Anesthesia Type: general      Patient location: [facility]  Patient participation: Yes- Able to Participate  Level of consciousness: awake and alert  Post-procedure vital signs: reviewed and stable  Pain management: adequate  Airway patency: patent    PONV status at discharge: No PONV  Anesthetic complications: no      Cardiovascular status: blood pressure returned to baseline, hemodynamically stable and stable  Respiratory status: unassisted  Hydration status: euvolemic  Follow-up not needed.  Comments: Pt voices appreciation for care          Vitals Value Taken Time   /83 06/07/23 1403   Temp 37 °C (98.6 °F) 06/07/23 1403   Pulse 82 06/07/23 1403   Resp 18 06/07/23 1403   SpO2 97 % 06/07/23 1403         No case tracking events are documented in the log.      Pain/Alexi Score: No data recorded

## 2023-06-07 NOTE — ANESTHESIA RELEASE NOTE
"Anesthesia Release from PACU Note    Patient: Jesse Andrade    Procedure(s) Performed: Procedure(s) (LRB):  INJECTION, STEROID, SPINE, CERVICAL, EPIDURAL (Right)    Anesthesia type: general    Post pain: Adequate analgesia    Post assessment: no apparent anesthetic complications    Last Vitals:   Visit Vitals  /83 (BP Location: Right arm, Patient Position: Lying)   Pulse 82   Temp 37 °C (98.6 °F) (Oral)   Resp 18   Ht 6' 4" (1.93 m)   Wt 111.1 kg (245 lb)   SpO2 97%   BMI 29.82 kg/m²       Post vital signs: stable    Level of consciousness: awake    Nausea/Vomiting: no nausea/no vomiting    Complications: none    Airway Patency: patent    Respiratory: unassisted    Cardiovascular: stable and blood pressure at baseline    Hydration: euvolemic  "

## 2023-06-07 NOTE — TRANSFER OF CARE
"Anesthesia Transfer of Care Note    Patient: Jesse Andrade    Procedure(s) Performed: Procedure(s) (LRB):  INJECTION, STEROID, SPINE, CERVICAL, EPIDURAL (Right)    Patient location: Other: [facility]    Anesthesia Type: general    Transport from OR: Transported from OR on room air with adequate spontaneous ventilation    Post pain: adequate analgesia    Post assessment: no apparent anesthetic complications    Post vital signs: stable    Level of consciousness: sedated and responds to stimulation    Nausea/Vomiting: no nausea/vomiting    Complications: none    Transfer of care protocol was followedComments: Good SV continue, NAD noted, VSS, RTRN      Last vitals:   Visit Vitals  /83 (BP Location: Right arm, Patient Position: Lying)   Pulse 82   Temp 37 °C (98.6 °F) (Oral)   Resp 18   Ht 6' 4" (1.93 m)   Wt 111.1 kg (245 lb)   SpO2 97%   BMI 29.82 kg/m²     "

## 2023-06-07 NOTE — H&P
No changes from original H & P note dated 05/08/2023 and has been reviewed and is valid. See scanned note.

## 2023-06-07 NOTE — PLAN OF CARE
Plan:  D/c pt via wheelchair at 1440  Informed pt if does not void in 8 hours to go to ER. Notify if redness, drainage, from injection site or fever over next 3-4 days. Rest and drink plenty of fluids for the remainder of the day. No lifting over 5 lbs. For the remainder of the day. Continue regular medications as prescribed. May take pain medications as prescribed.     Pain improved 100%

## 2023-06-07 NOTE — OP NOTE
06/07/2023  PREOPERATIVE DIAGNOSIS:     Cervical Radiculopathy                                                                Neck Pain         POSTOPERATIVE DIAGNOSIS:  Cervical Radiculopathy                                                                Neck Pain         PROCEDURE:  Catheter Guided Cervical Epidural Steroid Injection under Fluoroscopic Guidance at C 6-7level.          SURGEON: Dr Jaziel Pierson    COMPLICATIONS: None    ANESTHESIA:  MAC    DRAINS AND PACKS:  None    BLOOD LOSS:  None         The patient was identified in the holding area.  The risks and benefits of the procedure were again explained to the patient and the patient agreed to proceed.  The patient was taken in stable condition to the procedure room and was placed in prone position on the C-Arm table.  All pressure points were checked and padded comfortably while the patient was awake.  Time out was completed.  Standard ASA monitors applied.  Anesthesia was initiated.  Patient was comfortable and the neck was then prepped and draped in usual sterile fashion. The skin wheal  using Bupivacaine 0.25% (2.5 mg/ml) 1cc was raised over the C7-T1 interspaces and an 18 gauge needle was advanced under fluoroscopic guidance into the epidural space with loss of resistance confirmed with air.  The stylet was removed and there was negative aspiration of heme and CSF.  A Versicath Catheter was advanced to the target level at  C 6-7  level. The patient then received a 2 cc allotment of Isovue M 300 contrast with excellent delineation within the epidural space.  After confirmation and appropriate placement of the cannula, the patient then received  Kenalog 40mg/ml 1ml with 2ccs of 0.25% Bupivacaine(2.5mg/ml) and 2ccs of preservative free Saline.  The needle was removed with tip intact.  There was adequate hemostasis at the conclusion of the procedure. The patient was taken in stable condition to the holding area and monitored for the appropriate time  of convalescence.  The patient tolerated the procedure well with no adverse events.          The patients preoperative pain score was  9/10.     The postoperative pain score is  0/10.

## 2023-06-07 NOTE — ANESTHESIA PREPROCEDURE EVALUATION
06/07/2023  Jesse Andrade is a 53 y.o., male.      Pre-op Assessment    I have reviewed the Patient Summary Reports.     I have reviewed the Nursing Notes. I have reviewed the NPO Status.   I have reviewed the Medications.     Review of Systems  Cardiovascular:   Exercise tolerance: good Hypertension    Pulmonary:   Sleep Apnea    Musculoskeletal:  Spine Disorders: lumbar Chronic Pain        Physical Exam  General: Well nourished, Cooperative, Alert and Oriented    Airway:  Mallampati: II   Mouth Opening: Normal  TM Distance: Normal  Tongue: Normal  Neck ROM: Normal ROM        Anesthesia Plan  Type of Anesthesia, risks & benefits discussed:    Anesthesia Type: Gen Natural Airway  Intra-op Monitoring Plan: Standard ASA Monitors  Post Op Pain Control Plan: multimodal analgesia  Induction:  IV  Airway Plan: , Awake  Informed Consent: Informed consent signed with the Patient and all parties understand the risks and agree with anesthesia plan.  All questions answered. Patient consented to blood products? Yes  ASA Score: 3  Day of Surgery Review of History & Physical: H&P Update referred to the surgeon/provider.    Ready For Surgery From Anesthesia Perspective.     .

## 2023-06-07 NOTE — DISCHARGE SUMMARY
Patient underwent  Catheter Guided Cervical Epidural Steroid Injection under Fluoroscopic Guidance at C 6-7 level  procedure /2023. The pt will follow up in clinic. Discharged home. Discharge Dx: Cervical Radiculopathy

## 2023-06-07 NOTE — OP NOTE
Patient underwent    procedure /2023. The pt will follow up in clinic. Discharged home. Discharge Dx:

## 2023-06-09 DIAGNOSIS — Z71.89 COMPLEX CARE COORDINATION: ICD-10-CM

## 2023-06-30 ENCOUNTER — EXTERNAL CHRONIC CARE MANAGEMENT (OUTPATIENT)
Dept: FAMILY MEDICINE | Facility: CLINIC | Age: 53
End: 2023-06-30
Payer: MEDICARE

## 2023-06-30 PROCEDURE — G0511 PR CHRONIC CARE MGMT, RHC OR FQHC ONLY, 20 MINS OR MORE: ICD-10-PCS | Mod: ,,, | Performed by: FAMILY MEDICINE

## 2023-06-30 PROCEDURE — G0511 CCM/BHI BY RHC/FQHC 20MIN MO: HCPCS | Mod: ,,, | Performed by: FAMILY MEDICINE

## 2023-07-10 ENCOUNTER — OFFICE VISIT (OUTPATIENT)
Dept: FAMILY MEDICINE | Facility: CLINIC | Age: 53
End: 2023-07-10
Payer: MEDICARE

## 2023-07-10 VITALS
BODY MASS INDEX: 28.74 KG/M2 | DIASTOLIC BLOOD PRESSURE: 67 MMHG | SYSTOLIC BLOOD PRESSURE: 104 MMHG | OXYGEN SATURATION: 97 % | HEIGHT: 76 IN | HEART RATE: 77 BPM | RESPIRATION RATE: 18 BRPM | WEIGHT: 236 LBS | TEMPERATURE: 98 F

## 2023-07-10 DIAGNOSIS — S47.1XXA: Primary | ICD-10-CM

## 2023-07-10 PROCEDURE — 3078F DIAST BP <80 MM HG: CPT | Mod: ,,, | Performed by: FAMILY MEDICINE

## 2023-07-10 PROCEDURE — 99213 PR OFFICE/OUTPT VISIT, EST, LEVL III, 20-29 MIN: ICD-10-PCS | Mod: ,,, | Performed by: FAMILY MEDICINE

## 2023-07-10 PROCEDURE — 3074F SYST BP LT 130 MM HG: CPT | Mod: ,,, | Performed by: FAMILY MEDICINE

## 2023-07-10 PROCEDURE — 4010F PR ACE/ARB THEARPY RXD/TAKEN: ICD-10-PCS | Mod: ,,, | Performed by: FAMILY MEDICINE

## 2023-07-10 PROCEDURE — 1159F PR MEDICATION LIST DOCUMENTED IN MEDICAL RECORD: ICD-10-PCS | Mod: ,,, | Performed by: FAMILY MEDICINE

## 2023-07-10 PROCEDURE — 3008F PR BODY MASS INDEX (BMI) DOCUMENTED: ICD-10-PCS | Mod: ,,, | Performed by: FAMILY MEDICINE

## 2023-07-10 PROCEDURE — 3078F PR MOST RECENT DIASTOLIC BLOOD PRESSURE < 80 MM HG: ICD-10-PCS | Mod: ,,, | Performed by: FAMILY MEDICINE

## 2023-07-10 PROCEDURE — 3008F BODY MASS INDEX DOCD: CPT | Mod: ,,, | Performed by: FAMILY MEDICINE

## 2023-07-10 PROCEDURE — 3074F PR MOST RECENT SYSTOLIC BLOOD PRESSURE < 130 MM HG: ICD-10-PCS | Mod: ,,, | Performed by: FAMILY MEDICINE

## 2023-07-10 PROCEDURE — 99213 OFFICE O/P EST LOW 20 MIN: CPT | Mod: ,,, | Performed by: FAMILY MEDICINE

## 2023-07-10 PROCEDURE — 1159F MED LIST DOCD IN RCRD: CPT | Mod: ,,, | Performed by: FAMILY MEDICINE

## 2023-07-10 PROCEDURE — 4010F ACE/ARB THERAPY RXD/TAKEN: CPT | Mod: ,,, | Performed by: FAMILY MEDICINE

## 2023-07-10 NOTE — PROGRESS NOTES
Subjective     Patient ID: Jesse Andrade is a 53 y.o. male.    Chief Complaint: Shoulder Pain (Patient had a fall about 1 week ago and hurt his right shoulder. Patient has been in pain every since)    Patient fell directly onto his right shoulder/upper arm.    Shoulder Pain     Review of Systems       Objective     Physical Exam  Constitutional:       General: He is in acute distress.      Appearance: He is not ill-appearing.   Cardiovascular:      Rate and Rhythm: Normal rate and regular rhythm.   Musculoskeletal:         General: Tenderness present.      Right shoulder: Tenderness (diffuse tenderness right shoulder and right upper arm.) present. No swelling or crepitus. Decreased range of motion (range of motion limited by pain.).   Skin:     Findings: Bruising present.   Neurological:      Mental Status: He is alert.          Assessment and Plan     1. Crushing injury of right shoulder, initial encounter  -     X-ray Shoulder 2 or More Views Right; Future; Expected date: 07/10/2023        I see no fracture on his x-ray.  Radiology will review         No follow-ups on file.

## 2023-07-26 ENCOUNTER — ANESTHESIA (OUTPATIENT)
Dept: PAIN MEDICINE | Facility: HOSPITAL | Age: 53
End: 2023-07-26
Payer: MEDICARE

## 2023-07-26 ENCOUNTER — HOSPITAL ENCOUNTER (OUTPATIENT)
Facility: HOSPITAL | Age: 53
Discharge: HOME | End: 2023-07-26
Attending: ANESTHESIOLOGY | Admitting: ANESTHESIOLOGY
Payer: MEDICARE

## 2023-07-26 ENCOUNTER — ANESTHESIA EVENT (OUTPATIENT)
Dept: PAIN MEDICINE | Facility: HOSPITAL | Age: 53
End: 2023-07-26
Payer: MEDICARE

## 2023-07-26 VITALS
TEMPERATURE: 98 F | BODY MASS INDEX: 30.2 KG/M2 | HEART RATE: 58 BPM | OXYGEN SATURATION: 99 % | HEIGHT: 76 IN | WEIGHT: 248 LBS | DIASTOLIC BLOOD PRESSURE: 94 MMHG | SYSTOLIC BLOOD PRESSURE: 164 MMHG | RESPIRATION RATE: 11 BRPM

## 2023-07-26 DIAGNOSIS — M47.816 LUMBAR SPONDYLOSIS: ICD-10-CM

## 2023-07-26 PROCEDURE — D9220A PRA ANESTHESIA: Mod: ,,, | Performed by: NURSE ANESTHETIST, CERTIFIED REGISTERED

## 2023-07-26 PROCEDURE — 63600175 PHARM REV CODE 636 W HCPCS: Performed by: ANESTHESIOLOGY

## 2023-07-26 PROCEDURE — 63600175 PHARM REV CODE 636 W HCPCS: Performed by: NURSE ANESTHETIST, CERTIFIED REGISTERED

## 2023-07-26 PROCEDURE — 64635 DESTROY LUMB/SAC FACET JNT: CPT | Mod: 50 | Performed by: ANESTHESIOLOGY

## 2023-07-26 PROCEDURE — 37000008 HC ANESTHESIA 1ST 15 MINUTES: Performed by: ANESTHESIOLOGY

## 2023-07-26 PROCEDURE — D9220A PRA ANESTHESIA: ICD-10-PCS | Mod: ,,, | Performed by: NURSE ANESTHETIST, CERTIFIED REGISTERED

## 2023-07-26 PROCEDURE — 27000284 HC CANNULA NASAL: Performed by: NURSE ANESTHETIST, CERTIFIED REGISTERED

## 2023-07-26 PROCEDURE — 37000009 HC ANESTHESIA EA ADD 15 MINS: Performed by: ANESTHESIOLOGY

## 2023-07-26 PROCEDURE — 25000003 PHARM REV CODE 250: Performed by: NURSE ANESTHETIST, CERTIFIED REGISTERED

## 2023-07-26 RX ORDER — FENTANYL CITRATE 50 UG/ML
INJECTION, SOLUTION INTRAMUSCULAR; INTRAVENOUS
Status: DISCONTINUED | OUTPATIENT
Start: 2023-07-26 | End: 2023-07-26

## 2023-07-26 RX ORDER — TRIAMCINOLONE ACETONIDE 40 MG/ML
INJECTION, SUSPENSION INTRA-ARTICULAR; INTRAMUSCULAR CODE/TRAUMA/SEDATION MEDICATION
Status: DISCONTINUED | OUTPATIENT
Start: 2023-07-26 | End: 2023-07-26 | Stop reason: HOSPADM

## 2023-07-26 RX ORDER — LIDOCAINE HYDROCHLORIDE 20 MG/ML
INJECTION INTRAVENOUS
Status: DISCONTINUED | OUTPATIENT
Start: 2023-07-26 | End: 2023-07-26

## 2023-07-26 RX ORDER — BUPIVACAINE HYDROCHLORIDE 2.5 MG/ML
INJECTION, SOLUTION INFILTRATION; PERINEURAL CODE/TRAUMA/SEDATION MEDICATION
Status: DISCONTINUED | OUTPATIENT
Start: 2023-07-26 | End: 2023-07-26 | Stop reason: HOSPADM

## 2023-07-26 RX ORDER — PROPOFOL 10 MG/ML
VIAL (ML) INTRAVENOUS
Status: DISCONTINUED | OUTPATIENT
Start: 2023-07-26 | End: 2023-07-26

## 2023-07-26 RX ADMIN — SODIUM CHLORIDE: 9 INJECTION, SOLUTION INTRAVENOUS at 02:07

## 2023-07-26 RX ADMIN — LIDOCAINE HYDROCHLORIDE 50 MG: 20 INJECTION, SOLUTION INTRAVENOUS at 02:07

## 2023-07-26 RX ADMIN — PROPOFOL 50 MG: 10 INJECTION, EMULSION INTRAVENOUS at 03:07

## 2023-07-26 RX ADMIN — PROPOFOL 20 MG: 10 INJECTION, EMULSION INTRAVENOUS at 03:07

## 2023-07-26 RX ADMIN — FENTANYL CITRATE 100 MCG: 50 INJECTION INTRAMUSCULAR; INTRAVENOUS at 02:07

## 2023-07-26 RX ADMIN — PROPOFOL 50 MG: 10 INJECTION, EMULSION INTRAVENOUS at 02:07

## 2023-07-26 NOTE — PLAN OF CARE
Plan:  D/c pt via wheelchair at 1552    Informed pt if does not void in 8 hours to go to ER. Notify if redness, drainage, from injection site or fever over next 3-4 days. Rest and drink plenty of fluids for the remainder of the day. No lifting over 5 lbs. For the remainder of the day. Continue regular medications as prescribed. May take pain medications as prescribed.     Pain improved 100%

## 2023-07-26 NOTE — DISCHARGE SUMMARY
Patient underwent  Bilateral L4 -L5 Radiofrequency Thermocoagulation of the Medial Branch Nerves  procedure 07/26/2023. The pt will follow up in clinic. Discharged home. Discharge Dx:  Lumbar Spondylosis without Myelopathy

## 2023-07-26 NOTE — TRANSFER OF CARE
"Anesthesia Transfer of Care Note    Patient: Jesse Andrade    Procedure(s) Performed: Procedure(s) (LRB):  RADIOFREQUENCY ABLATION, NERVE, SPINAL, LUMBAR, MEDIAL BRANCH, 1 LEVEL (Bilateral)    Patient location: GI    Anesthesia Type: MAC and general    Transport from OR: Transported from OR on room air with adequate spontaneous ventilation    Post pain: adequate analgesia    Post assessment: no apparent anesthetic complications and tolerated procedure well    Post vital signs: stable    Level of consciousness: responds to stimulation and awake    Nausea/Vomiting: no nausea/vomiting    Complications: none    Transfer of care protocol was followed      Last vitals:   Visit Vitals  BP (!) 158/106 (Patient Position: Lying)   Pulse 64   Temp 36.7 °C (98 °F)   Resp 11   Ht 6' 4" (1.93 m)   Wt 112.5 kg (248 lb)   SpO2 99%   BMI 30.19 kg/m²     "

## 2023-07-26 NOTE — OP NOTE
07/26/2023  PREOPERATIVE DIAGNOSIS:         Lumbar Spondylosis without Myelopathy                                                              Low Back Pain  POSTOPERATIVE DIAGNOSIS:      Lumbar Spondylosis without Myelopathy                                                              Low Back Pain     PROCEDURE:  Bilateral L4 -L5 Radiofrequency Thermocoagulation of the Medial Branch Nerves     SURGEON: Dr. Jaziel Pierson   ANESTHESIA:  MAC              COMPLICATIONS:  None  DRAINS AND PACKS:  None            BLOOD LOSS:  None  The patient was identified in the holding area.  The risks and benefits of the procedure were again explained to the patient and the patient agreed to proceed.  The patient was brought in stable condition to the operating room and placed in the prone position on the C-Arm table.  All pressure points were checked and padded comfortably with the patient awake.  Standard ASA monitors were applied.  The patients back was prepped and draped in the usual sterile fashion.  Time out was completed.  Anesthesia was initiated and a skin wheal was raised over the target areas using  Bupivacaine 0.25% (2.5mg/ml) 1ml on the left side at  L4 and L5.  Under direct fluoroscopic guidance through anesthetized skin a 150 millimeter 10mm 18gage curved active tip radiofrequency thermocoagulation needle was advanced down to the target area at the junction between the superior articular process and transverse process of the corresponding levels.  Stimulation of the medial branch nerve was carried out and was less than 1.0 at all levels and motor was greater than 2.0 at each level.  The patient then has a 1 milliliter allotment of 0.25 % Bupivacaine (2.5mg/ml)  was injected at each level.  The patient then had a lesioning process of 80 degrees celsius for 105 five second times two runs.  The patient then through each cannula received a 1 milliliter allotment of a solution that contained Kenalog 40mg/ml  diluted in  9 milliliters of 0.25%  Bupivacaine (2.5mg/ml).  The stylettes were removed with the tips intact. The procedure was repeated on the right as described above. There was adequate hemostasis at the conclusion of the procedure. The patient tolerated the procedure well with no adverse events and no complications. The patient was taken in stable condition to the holding area and monitored for the appropriate time of convalescence.  Preoperative pain score was 7/10. Postoperative pain score was    /10.

## 2023-07-26 NOTE — H&P
No changes from original H & P note dated 06/29/2023 and has been reviewed and is valid. See scanned note.

## 2023-07-26 NOTE — ANESTHESIA PREPROCEDURE EVALUATION
07/26/2023  Jesse Andrade is a 53 y.o., male.      Pre-op Assessment    I have reviewed the Patient Summary Reports.     I have reviewed the Nursing Notes. I have reviewed the NPO Status.   I have reviewed the Medications.     Review of Systems  Anesthesia Hx:  No problems with previous Anesthesia    Social:  Former Smoker + Smokeless tobacco    Cardiovascular:   Hypertension hyperlipidemia ECG has been reviewed. Normal sinus rhythm   Minimal voltage criteria for LVH, may be normal variant ( Sokolow-Pérez )   Borderline Abnormal ECG    Pulmonary:   Sleep Apnea    Musculoskeletal:   Arthritis     Neurological:   Chronic Pain Syndrome       Physical Exam  General: Well nourished, Cooperative, Alert and Oriented    Airway:  Mallampati: II   Mouth Opening: Normal  TM Distance: Normal  Tongue: Normal  Neck ROM: Normal ROM    Dental:  Intact    Chest/Lungs:  Clear to auscultation, Normal Respiratory Rate    Heart:  Rate: Normal  Rhythm: Regular Rhythm        Anesthesia Plan  Type of Anesthesia, risks & benefits discussed:    Anesthesia Type: Gen Natural Airway  Intra-op Monitoring Plan: Standard ASA Monitors  Post Op Pain Control Plan: multimodal analgesia  Induction:  IV  Informed Consent: Informed consent signed with the Patient and all parties understand the risks and agree with anesthesia plan.  All questions answered. Patient consented to blood products? Yes  ASA Score: 2  Day of Surgery Review of History & Physical: H&P Update referred to the surgeon/provider.    Ready For Surgery From Anesthesia Perspective.     .    Past Medical History:   Diagnosis Date    Arthritis     Hyperlipidemia     Hypertension     Sleep apnea        Past Surgical History:   Procedure Laterality Date    CERVICAL SPINE SURGERY      fusion    EPIDURAL STEROID INJECTION INTO CERVICAL SPINE Right 6/7/2023    Procedure:  INJECTION, STEROID, SPINE, CERVICAL, EPIDURAL;  Surgeon: Jaziel Pierson MD;  Location: [facility];  Service: Pain Management;  Laterality: Right;  C6-7 cath guided ARTHUR with right bias    FACIAL FRACTURE SURGERY      INJECTION OF ANESTHETIC AGENT AROUND MEDIAL BRANCH NERVES INNERVATING LUMBAR FACET JOINT Bilateral 10/26/2022    Procedure: BLOCK, NERVE, FACET JOINT, LUMBAR, MEDIAL BRANCH;  Surgeon: Jaziel Pierson MD;  Location: [facility];  Service: Pain Management;  Laterality: Bilateral;  Bilateral L2-5 MBNB    RADIOFREQUENCY ABLATION OF LUMBAR MEDIAL BRANCH NERVE AT SINGLE LEVEL Right 12/14/2022    Procedure: RADIOFREQUENCY ABLATION, NERVE, SPINAL, LUMBAR, MEDIAL BRANCH, 1 LEVEL;  Surgeon: Jaziel Pierson MD;  Location: [facility];  Service: Pain Management;  Laterality: Right;  Right L3-5 RFTC    REFILL PAIN PUMP N/A 4/14/2021    Procedure: REFILLING, ANALGESIC PUMP;  Surgeon: Jaziel Pierson MD;  Location: [facility];  Service: Pain Management;  Laterality: N/A;  pump refill    REFILL PAIN PUMP N/A 10/27/2021    Procedure: REFILLING, ANALGESIC PUMP;  Surgeon: Jaziel Pierson MD;  Location: [facility];  Service: Pain Management;  Laterality: N/A;  Pump refill    REFILL PAIN PUMP N/A 4/13/2022    Procedure: REFILLING, ANALGESIC PUMP;  Surgeon: Jaziel Pierson MD;  Location: [facility];  Service: Pain Management;  Laterality: N/A;  Pump refill    REFILL PAIN PUMP N/A 10/5/2022    Procedure: REFILLING, ANALGESIC PUMP;  Surgeon: Jaziel Pierson MD;  Location: [facility];  Service: Pain Management;  Laterality: N/A;  Pump refill    REFILL PAIN PUMP N/A 3/29/2023    Procedure: REFILLING, ANALGESIC PUMP;  Surgeon: Jaziel Pierson MD;  Location: [facility];  Service: Pain Management;  Laterality: N/A;  Pump refill    TOTAL KNEE ARTHROPLASTY Bilateral        Family History   Problem Relation Age of Onset     Coronary artery disease Mother        Social History     Socioeconomic History    Marital status:    Tobacco Use    Smoking status: Former     Packs/day: 0.75     Years: 10.00     Pack years: 7.50     Types: Cigarettes     Passive exposure: Past    Smokeless tobacco: Current     Types: Snuff   Substance and Sexual Activity    Alcohol use: Never       No current facility-administered medications for this encounter.     Current Outpatient Medications   Medication Sig Dispense Refill    albuterol (PROAIR HFA) 90 mcg/actuation inhaler Inhale 2 puffs into the lungs every 6 (six) hours as needed for Wheezing. Rescue 18 g 1    amLODIPine (NORVASC) 5 MG tablet Take 1 tablet (5 mg total) by mouth once daily. 90 tablet 3    aspirin 81 MG Chew Take 1 tablet (81 mg total) by mouth once daily. 90 tablet 3    atorvastatin (LIPITOR) 40 MG tablet Take 2 tablets (80 mg total) by mouth every evening. 180 tablet 3    carvediloL (COREG) 25 MG tablet Take 1 tablet (25 mg total) by mouth 2 (two) times daily with meals. 180 tablet 3    cyclobenzaprine (FLEXERIL) 10 MG tablet Take 10 mg by mouth 3 (three) times daily as needed for Muscle spasms.      diclofenac sodium (VOLTAREN) 1 % Gel Apply 2 g topically 4 (four) times daily.      fluticasone propionate (FLONASE) 50 mcg/actuation nasal spray 1-2 sprays by Nasal route once daily.      gabapentin (NEURONTIN) 600 MG tablet Take 600 mg by mouth once daily.      lisinopriL (PRINIVIL,ZESTRIL) 40 MG tablet Take 1 tablet (40 mg total) by mouth once daily. 90 tablet 3    morphine 4 mg/mL Soln injection Inject 4 mg into the vein every 4 (four) hours.      oxyCODONE-acetaminophen (PERCOCET)  mg per tablet Take 1 tablet by mouth every 12 (twelve) hours as needed for Pain.      testosterone cypionate (DEPOTESTOTERONE CYPIONATE) 200 mg/mL injection          Review of patient's allergies indicates:   Allergen Reactions    Keflex [cephalexin]        Chemistry         Component Value Date/Time     01/11/2023 1200    K 5.0 01/11/2023 1200     01/11/2023 1200    CO2 33 (H) 01/11/2023 1200    BUN 11 01/11/2023 1200    CREATININE 1.03 01/11/2023 1200     01/11/2023 1200        Component Value Date/Time    CALCIUM 9.6 01/11/2023 1200    ESTGFRAFRICA 95 10/12/2020 1409    EGFRNONAA 79 10/12/2020 1409        Component Ref Range & Units 6 mo ago 2 yr ago   WBC 4.50 - 11.00 K/uL 6.34  6.47 R    RBC 4.60 - 6.20 M/uL 5.38  4.79 R    Hemoglobin 13.5 - 18.0 g/dL 14.1  13.1 Low     Hematocrit 40.0 - 54.0 % 46.1  42.5    MCV 80.0 - 96.0 fL 85.7  88.7 R    MCH 27.0 - 31.0 pg 26.2 Low   27.3    MCHC 32.0 - 36.0 g/dL 30.6 Low   30.8 Low     RDW 11.5 - 14.5 % 15.9 High   17.1 High     Platelet Count 150 - 400 K/uL 319

## 2023-07-27 NOTE — ANESTHESIA POSTPROCEDURE EVALUATION
Anesthesia Post Evaluation    Patient: Jesse Andrade    Procedure(s) Performed: Procedure(s) (LRB):  RADIOFREQUENCY ABLATION, NERVE, SPINAL, LUMBAR, MEDIAL BRANCH, 1 LEVEL (Bilateral)    Final Anesthesia Type: general      Patient location during evaluation: GI PACU  Patient participation: Yes- Able to Participate  Level of consciousness: awake and alert  Post-procedure vital signs: reviewed and stable  Pain management: adequate  Airway patency: patent    PONV status at discharge: No PONV  Anesthetic complications: no      Cardiovascular status: stable  Respiratory status: unassisted, spontaneous ventilation and room air  Hydration status: euvolemic  Follow-up not needed.          Vitals Value Taken Time   /79 07/27/23 0822   Temp 36.7 °C (98 °F) 07/26/23 1514   Pulse 61 07/27/23 0822   Resp 13 07/26/23 1547   SpO2 99 % 07/27/23 0822   Vitals shown include unvalidated device data.      Event Time   Out of Recovery 15:45:00         Pain/Alexi Score: Alexi Score: 10 (7/26/2023  3:45 PM)

## 2023-07-31 ENCOUNTER — EXTERNAL CHRONIC CARE MANAGEMENT (OUTPATIENT)
Dept: FAMILY MEDICINE | Facility: CLINIC | Age: 53
End: 2023-07-31
Payer: MEDICARE

## 2023-07-31 PROCEDURE — G0511 PR CHRONIC CARE MGMT, RHC OR FQHC ONLY, 20 MINS OR MORE: ICD-10-PCS | Mod: ,,, | Performed by: FAMILY MEDICINE

## 2023-07-31 PROCEDURE — G0511 CCM/BHI BY RHC/FQHC 20MIN MO: HCPCS | Mod: ,,, | Performed by: FAMILY MEDICINE

## 2023-08-31 ENCOUNTER — EXTERNAL CHRONIC CARE MANAGEMENT (OUTPATIENT)
Dept: FAMILY MEDICINE | Facility: CLINIC | Age: 53
End: 2023-08-31
Payer: MEDICARE

## 2023-08-31 PROCEDURE — G0511 PR CHRONIC CARE MGMT, RHC OR FQHC ONLY, 20 MINS OR MORE: ICD-10-PCS | Mod: ,,, | Performed by: FAMILY MEDICINE

## 2023-08-31 PROCEDURE — G0511 CCM/BHI BY RHC/FQHC 20MIN MO: HCPCS | Mod: ,,, | Performed by: FAMILY MEDICINE

## 2023-09-20 ENCOUNTER — HOSPITAL ENCOUNTER (OUTPATIENT)
Facility: HOSPITAL | Age: 53
Discharge: HOME | End: 2023-09-20
Attending: ANESTHESIOLOGY | Admitting: ANESTHESIOLOGY
Payer: MEDICARE

## 2023-09-20 VITALS
DIASTOLIC BLOOD PRESSURE: 101 MMHG | BODY MASS INDEX: 30.56 KG/M2 | OXYGEN SATURATION: 97 % | HEART RATE: 74 BPM | WEIGHT: 251 LBS | RESPIRATION RATE: 18 BRPM | SYSTOLIC BLOOD PRESSURE: 153 MMHG | HEIGHT: 76 IN

## 2023-09-20 DIAGNOSIS — G89.4 CHRONIC PAIN SYNDROME: ICD-10-CM

## 2023-09-20 PROCEDURE — 62370 ANL SP INF PMP W/MDREPRG&FIL: CPT | Performed by: ANESTHESIOLOGY

## 2023-09-20 PROCEDURE — 63600175 PHARM REV CODE 636 W HCPCS

## 2023-09-20 NOTE — H&P
"[facility] - Pain Management  Pain Management  H&P    Patient Name: Jesse Andrade  MRN: [id number]  Admission Date: 2023  Primary Care Provider: Otilio Vargas MD    Patient information was obtained from .     Subjective:     Principal Problem:    Chief Complaint:      HPI: Mary Torres FNP  [address]  [place] [address]  [phone]          RE: Jesse Andrade  : 1970  Date of Service: 2023  Procedure Follow-Up Bilareral L4-5 RFTC pre 7 post 0 with     80% relief  Existing Patient      Chief Complaint:  Neck pain described as aching, constant, dull, sharp; Location bilaterally, at the midline, radiating to shoulder(s); aggravated by activity, poor posture; relieved by narcotics, rest-lying down; onset gradual, constant; reported as 6/10 on pain scale,  Back pain achy in nature, constant, dull; located in the lumbar region, in the midline; aggravated by activity, standing, bending, lifting, walking, weather/temperature change; relieved by analgesics, rest; gradual in occurrence; pain rated as 4/10 on the pain scale,  Knee pain Location right patella; characterized as aching aggravated by walking Timing constant Severity moderate to severe.  Patient seated in room 8 with c/o neck and right hip pain, reports pain is unchanged since last visit  Did not have procedure        [place] Prescription Monitoring Program data was reviewed for this patient for the past 12 calendar months to ascertain any current, or past use of scheduled medications.  History of Present Illness:  What part of the body? neck and right hip  Pain level at best 3; Pain level at worst 6; Pain level at present 7; Pain level on average 6  54 y/o BM with complaints of "low back pain and Right knee pain"; objective data essentially unchanged from previous visit; states that pain improved by 80% after Bilateral L4-L5 RFTC and still continues to do well with lower back pain relief; states that pain " improved by 85% after cervical ARTHUR but will occasionally have some LUE tingling that lasted for several weeks but is now ready to have an additional injection to help with neck pain without radicular symptoms; his last cervical RF was June 2022; he has fallen as well since last procedure and landed on his R side but did have some xrays done that revealed no changes; he was able to have his Lumbar RFTC done in Dec but only did the Right side because he took his ASA that morning; he actually had about 80% improvement of pain; pain is only worse with increased movements and first in the morning but improves throughout the day; he has already had his pump refill on 10/05 as well as #2 MBNB that improved by 85% that lasted for a couple of weeks; states that pain improved by 90% that lasted about 2 weeks after Bilateral L2-L5 MBNB #1; he has just recently had his Cervical RFTC in June that has improved his pain and is not having much pain at all to his cervical spine; states that pain improved by 80% after Left C3-C7 RFTC in Nov 2021; overall, pain has been about the same except for some worsening lower back pain; states that pain improved by 80% after Bilateral C3-C7 MBNB that lasted for about a week; pain does get better with movement but tingling to his fingers has improved; we will consider ARTHUR on return if tingling persists/returns but this has improved since last procedure; pain improved by 85% after Bilateral L3-L5 MBNB in 09/2021 that lasted for several months and is worse now with bending and moving; he was able to have his CT Lspine 01/2021 that revealed some changes but the last injection helped with pain; he was also able to have his pump refilled in Oct and will be due again in April;  states that he has started walking some but causes worsening pain; states that pain improved by 75% after Bilateral C3-C7 MBNB in Dec that lasted for several months but pain is slowly returning and is ready to schedule a neck  injection to help with radicular symptoms at this time; denies any radiation of pain into legs and no numbness or tingling to upper or lower extremities; exercise has helped some but pain is still there; states that neck pain has been worse the past several months that wakes him up at night; he is now having more pain that prevents sleeping and from doing every day activity; states that pain improved by 75% after his Bilateral C3-C7 RFTC that lasted for several months; states that pain improved after Bilateral C3-C7 #1 about 80% and then has had a 80% improvement in pain after his Bilateral C3-C7 FI#2 as well as had 50% improvement of ADL's; He has been going to physical therapy after having a R knee replacement in Jan 2019 by Dr. Garcia and has had more pain to knee but Percocet is helping better with that pain as well; he is still exercising some but continues to have pain and swelling; usually ice, rest and meds help with this; he sees Dr. Garcia again in July 2020 but feels like it is not healing as well as it should; he has been going to the gym to help with pain and strengthening; states he has been doing well and is better today since last visit after having his last injection; He describes the pain as dull pain that is constant; he has also started taking testosterone injections at home and still has 1 RF left at pharmacy; denies any issues with constipation; He has been experiencing some tingling and numbness to his R thigh; he started Neurontin 300mg BID since last visit and states it is helping with the numbness and tingling sensation and request to continue with 600 mg daily; also states that the Flexeril has helped better with muscle spasms; he is due for his next pump refill          UDS: consistent x 20; inconsistent x 3 (no percocet, +Norco that he had left over from the past but instructed that this can not happen again) UDS due today     The previous urine drug screen was evaluated, and it was  compliant for the medications that has been prescribed. A presumptive urine drug screen was done today to rapidly obtain and integrate results into clinical assessment and decision-making for ongoing safe prescribing of controlled substances. The results of the presumptive UDS done today was positive for opiates. He is prescribed oxycodone. Because presumptive UDS positive results are not definitive due to sensitivity and specificity and cross reactivity limitations and negative results do not necessarily indicate absence of drugs or substances in the urine specimen, confirmation will identify specific prescribed and non-prescribed medications or illicit use for ongoing safe prescribing of controlled substances including benzodiazepines, opioid agonist, opioids antagonist, partial agonist, stimulants, muscle relaxers, antidepressants, sleep aids, anti-seizure medicine, and alcohol. Urine drug analysis is used to assist with diagnosis and therapeutic decision-making concerning pretreatment assessment. Intensity and frequency of monitoring with urine drug testing will be based on the risk stratification method in determining risk level for opioid addiction.         Meds: Percocet --due 07/12/2023; requests refills on meds and states that meds help with his pain; no misuse of meds and no opioid abuse;  reviewed and is appropriate; he is currently prescribed 30 mg of morphine equivalent meds/day    Nursing:  Pain Medication/Dose/Last Taken/# Taken  ms pump    oxycodone    pain level with medication is  4/10 and without is a 8/10        Is it helping? Yes  Physical Therapy  no Home Exercises  Is it helping? Yes    no New medical problems or surgeries  no New medications  no New allergies  no New antibiotics, fever, infection  Allergies:  Allergies Reviewed - 08/22/23 10:27:16 AM CST  Keflex  Current Medications:  Medications List Reviewed (08/22/23 10:27:11 AM CST)  Diclofenac Sodium External Gel 1 % (5/8/2023) Apply  "2 gram applications three times a day for 30 day(s)  Cyclobenzaprine HCl Oral Tablet 10 MG (2023) Take 1 tablet twice a day as needed for 30 day(s)  Gabapentin Oral Tablet 600 MG (2023) Take 1 tablet twice a day for 30 day(s)  Vitamin D3 Oral Capsule 1.25 MG (54942 UT) (2023) Take 1 capsule once a week for 4 week(s)  Percocet Oral Tablet  MG (2023) Take 1 tablet twice a day as needed for 30 day(s)  Testosterone Cypionate Intramuscular Solution 200 MG/ML (2023) Inject 1/2 milliliter IM every 72 hours  Morphine Sulfate Intramuscular Device 10 MG/0.7ML (2017) Morphine Pain Pump  Aspirin Oral Tablet Delayed Release 325 MG (2017) Take 1 tablet twice a week for 30 week(s)  Atorvastatin Calcium Oral Tablet 10 MG (2017) Take 1 tablet once a day for 30 day(s)  Previous Studies:  CT SCAN  Final Report  CT CTIC SPINE LUMBAR W/O CONTRAST    Show Printer-Friendly Version with Images (4 of 5)  Show Printer-Friendly Version without images   Patient Name: eJsse Andrade  : Mar-  ID: [id number]([facility])  Study Date:  08:03        Studies- CTIC SPINE LUMBAR W/O CONTRAST Indications- Lumbar radiculopathy. Loose leads on pain pump Comparison- None. Technique- CT of the lumbar spine was performed without the administration of intravenous contrast. Axial, sagittal, and coronal series were submitted for interpretation. Findings- Alignment of the lumbar vertebral bodies is normal. Intervertebral disc spaces as well as vertebral body heights are well maintained throughout the lumbar spine. Facet joints have normal anatomic relationships and minimal degenerative change. No acute fractures are demonstrated. The imaged intra-abdominal and intrapelvic contents demonstrate no evidence of acute pathology. Visualized portion of the "lead" demonstrates no evidence of discontinuity. Multiple enlarged lymph nodes are demonstrated within the ileocolic mesentery. These measure 7 mm in short " axis dimension. Additional enlarged lymph nodes in the paracaval region measuring up to 6-7 mm. Borderline bilateral intrapelvic lymph nodes measuring up to 8 mm short axis dimension are demonstrated. Conclusion- 1. Multiple intrapelvic and mesenteric lymph nodes are demonstrated which are borderline in size to minimally enlarged. When compared to additional imaging of the abdomen and pelvis performed 2014, these lymph nodes appear to have minimally increased in size as well as number. Significance of these lymph nodes and relevant to patient's back pain is uncertain. Correlation recommended. 2. No significant abnormality of the lumbar spine is demonstrated. 3. No break in continuity of the demonstrated lead is present. 2016 8-30 AM Ordering physician-SALINAS SINGH MD Point of service- [facility]. This report has been electronically signed by Ryley Altman - MARYBEL Kaplan Radiologist- RYLEY ALTMAN II, M.D. Releasing Radiologist- RYLEY ALTMAN II, M.D. Released Date Time- 16 0845 ------------------------------------------------------------------------------     Signed by: Serena Weiner    Signed on:  08:30    CT [facility] 2021  Impression:  1. no acute fracture or subluxation within the lumber spine  2. Mild degenerative disc disease and facet/unconvertebral arthropathy at L5/S1. Moderate symmetric bilateral neuroforaminal stenoses at L5/S1  3. Very mild to mild midline broad-based posterior intervertebral disc bulges from L3/L4-L5/S1 without high grade spinal canal stenosis       ; X-RAY  Final Report  CR XR RIBS LEFT WITH PA/AP CHEST    Show Printer-Friendly Version   Patient Name: Jesse Andrade  : Mar-  ID: [id number]([facility])  Study Date:  13:59        AP chest with left rib detail. Indication- Fall, with left chest wall pain. The heart size is normal. There is elevation of the right hemidiaphragm, stable  finding. Surgical clips in the right upper quadrant. Postsurgical changes in the cervical spine. No pneumothorax. No pleural effusion. No rib fracture is seen. Impression- No acute abnormality. Place of service- [facility] This report has been electronically signed by Mariah Saucedo - MARYBEL SAUCEDO M.D. Releasing Dagmar SAUCEDO M.D. Released Date Time- 18 1419 ------------------------------------------------------------------------------     Signed by: Serena Weiner    Signed on:  14:13  Final Report  CR CR SPINE CERVICAL AP AND LATERAL  Show Printer-Friendly Version Patient Name: Jesse Andrade  : Mar-  ID: [id number]([facility])  Study Date: Mar- 12:39     CR SPINE CERVICAL AP AND LATERAL    Indication- Cervicalgia    Comparison- Cervical spine x-ray Shirley 15, 2005    Technique- Frontal and lateral views of the cervical spine.    Findings-     There is straightening of normal cervical lordosis which may be  positional or secondary muscle spasm. There is no significant  anterolisthesis or retrolisthesis.  Anterior cervical fusion hardware at  C5-C7 with osseous fusion. Mild marginal osteophyte formation noted at  C3-4 and C4-5. The C7-T1 level is not well visualized on lateral view.  No gross evidence of significant vertebral body height loss.    IMPRESSION-    Degenerative change and malalignment of the cervical spine as detailed  above.Anterior cervical fusion hardware at C5-C7 with osseous fusion.       Point of Service- [facility]    This report has been electronically signed by Patrick BATRES D.O.       Released Date Time- 19 1409   ------------------------------------------------------------------------------    Signed by: Serena Weiner Signed on: Mar- 13:58  ; Findings  LAB  results from Dr. Daley:  2018  Vitamin D  10.8  Aug 2018  PSA  0.989  Testosterone 255  Total Testosterone 226  Free Testosterone 4.75     C spine X-ray 2019     Past Medical History:  The patient has a past medical history of  Hypertension, High Cholesterol, Osteoarthritis (OA), Joint Pain.  There is no past medical history of  Cardiac Pacer, Diabetes Mellitus type 1, Diabetes Mellitus type 2, Chronic Obstructive Pulmonary Disease, Rheumatoid Arthritis, Gastroesophageal Reflux Disease, Cerebrovascular Accident (CVA), Cardiovascular Disease, Alcoholism, Crohn's disease, Terminal Illness.  severe dementianutritional quality good  Social History:  Smoking Status: Light tobacco smoker; Last Reviewed: 2023  Pack-years: 1  Date quit smoking: 10 years ago  Alcohol use: Non-Drinker  Racial background:   Occupation: disabled  Marital status:   Patient knows the purpose/use of medications  Patient is taking medications as prescribed  Family History:  Father  History remarkable for  gun shot.   Age 22;      Mother  History remarkable for  Coronary Artery Disease.   Age 42;   Review of Systems:  General:  Patient denies  sweats, fatigue, fever, chills.  Ears, Nose and Throat:  Patient denies  hearing loss, ringing in the ears.  Cardiovascular:  Patient denies  chest pain.  Respiratory:  Patient denies  shortness of breath.  Gastrointestinal:  Patient denies  nausea, vomiting, diarrhea, constipation.  Genitourinary:  Patient denies  urinary frequency, prostate problems.  Endocrine:  Patient denies  thyroid problems.  Hematologic:  Patient denies  bleeding tendencies, easy bruising tendency.  Musculoskeletal:  Patient denies  joint pain, walking aids.  Neurologic  Patient denies  seizures, headache.  Psychologic:  Patient denies  anxiety, panic attacks, depression.  Skin:  Patient denies  skin rash.  DEPRESSION SCREENING:  Not at all the patient reports little interest or  "pleasure in doing things.  Not at all the patient reports feeling down, depressed, or hopeless.  Date Depression Screening Last Done: 02/13/2020  PHQ-2 Score 0; PHQ-9 Score incomplete  Several days the patient reports little interest or pleasure in doing things.  Several days the patient reports feeling down, depressed, or hopeless.  Date Depression Screening Last Done: 05/14/2019  Vital Signs:  Weight 256 lbs; Height 6 ft 4 in; BMI 31.2  08/22/2023 9:43 AM (CST)  Temperature 98.6 °F; Respiration Rate 18  08/22/2023 9:47 AM (CST)  Pulse Rate 65 bpm; Blood Pressure 120 / 75 mm/Hg; Pain Level: 6  Physical Examination:  Pre Anesthesia evaluation  Pre Op dx: cervical radiculopathy  Planned procedure: Cath guided C6-C7 ARTHUR with right bias  Age: 53  Ht: 6'4"  Wt: 237 lbs  BMI: 28.9  Allergies: Keflex  Meds/Labs/Test  Prior Surgeries:  Anethesia complications: none known     Medical History  CNS: _X_Neg.   __Seizures  __CVA  __TIA  __HA  __Depression  Cardiac: __Neg  __CAD  __Stents  __MI  _X_HTN  __CHF  Pulmonary: __Neg  __COPD  __Asthma  __Sleep Apnea  __Smoker PPD  __CPAP  GI:_X_Neg.  __Reflux  __Liver Dysfunction  __Hepatitis  __Hiatal Hernia  __Hepatitis  __ETOH  __GERD   Renal:  _X_Neg.  __CRI  __ESRD  Endocrine:  _X_Neg.  __Thyroid  __Diabetes  Heme:  _X_Neg.   __Blood thinners  __other     Cranial Nerves II-XII grossly intact.  No apparent distress.  Patient is alert and oriented times three.  No somnolence or slurred speech.         Lumbar spine has pain with flexion and extension lateral rotation  Lumbar facets are tender to palpation  No focal neurologic deficits noted  physical exam essentially unchanged from previous  Back Motion:  Lumbar / lumbosacral spine abnormal.  Additional Physical Findings:  General general appearance normal appears comfortable,   Oriented to time, place and person, pleasant, seated  Not uncomfortable  Head normal head exam  Eyes normal eye exam  Chest normal chest exam  Respiratory " normal respiratory exam  Musculoskeletal abnormal low back pain and knee pain,   Joint tenderness  Posture normal  Neurologic normal neurologic exam  Skin normal skin exam  Toxicology Report  Toxicology was performed.  Reason for Toxicology:  A presumptive urine drug screen was done today to rapidly obtain and integrate results into clinical assessment and decision-making for ongoing safe prescribing of controlled substances.  Assessment:  (M25.569) - Knee pain  (M54.50) - Low back pain  (Z79.891) - Opioid use agreement exists  (E29.1) - Testicular hypofunction  (M54.16) - Lumbar radiculopathy  (M47.812) - Cervical spondylosis without myelopathy  (M47.817) - Lumbosacral spondylosis  (M54.12) - Cervical radiculopathy  (M54.2) - Neck pain  (G89.4) - Chronic pain syndrome  Plan:  Follow up visit 2 months  -refilled Percocet -- due 09/11/2023    -refilled Testosterone with 2 RF (due today)    -Sent rx for Flexeril 10 mg TID, Gabapentin 600 mg BID  and Voltaren gel to [facility] with 5 RF (due again in Nov)    -drink plenty of fluids and water    -increase fiber in diet    -call sooner with worsening pain    -pump refill was done on 03/29/2023    -next alarm date will be 09/22/2023    -will schedule pump refill for 09/20/2023 at [facility] at 10:00 am     -refill Vit D 57971 unit weekly to [facility] with 5 RF    -will did receive cardiac clearance from Dr. Arteaga      -scheduled Bilateral cervical FI on return visit                   Patient has a medical problem of intermediate acuity. The medical condition is not life threatening but poses a significant impact on morbidity and/or impacts the patients activities of daily living. This procedure is medically necessary to reduce pain and improve functionality.       Indications for this procedure for this specific patient include the following:   - Pt has had symptoms for three months with moderate to severe pain with functional impairment rated of  /10 pain.   - Pain  non-responsive to conservative care.  - Pain predominately axial and not associated with radiculopathy or claudication.  - No non-facet pathology as source of pain.  - Clinical assessment implicates facet joint as putative pain source.   - Pain is exacerbated by extension or prolonged sitting/standing and relieved by rest.   - No unexplained neurologic deficit.   - No history of coagulopathy , infection or unstable medical conditions.  - Pain is causing significant functional limitation resulting in diminished quality of life and impaired age appropriate ADL's.  - Repeat injections not done prior to 7 days.  - No more than 2 levels will be done per side.     NSAIDS Failure_YES___  Pain for 3 months or >_YES____  Pain level 6> intermittent or continuous__YES__  Physical exam with documented signs that facets are the primary source of pain_YES___        Due to the presence of cervical radicular symptoms, we have elected to proceed with cervical epidural steroid injections at the level confirmed by physical examination and accompanying studies. This is medically necessary to improve function, reduce pain and limitations, and to reduce the reliance on pain medications. Additional epidural steroid injections will be based on patient improvement.     NARCOTIC STATEMENT  Patient is taking the narcotic pain medications as prescribed. Refill is being given because of the benefit to the patient in regards to the pain. Patient has agreed not to abuse of medication and not to take it more than what is prescribed. The nature of the drug including the potential for addiction and dependency and abuse was also discussed with the patient. Patient has developed physical dependency for the narcotic pain medication for his pain relief.  Patient has also developed tolerance to the sedative effect of the narcotic pain medications.  Patient has decided to continue with these medications despite potential for addiction as described by this  office.  This was stressed to the patient that it is the patient's responsibility to secure the narcotic medication and in any event of loss for any reason whatsoever,  there will be no refill before the next due date. Patient also understands that they are not supposed to drive or work on machinery while taking these medications.  Also explained to the patient that in the event of traffic citation, the presence of this drug in  bloodstream may result in DUI.  Patient has been advised not to drink alcohol while taking this medication.  Patient has verbalized understanding of our office policy and has signed a contract with us in this regard.      Assessment/Plan:         Jaziel Pierson MD  Pain Management  [facility] - Pain Management

## 2023-09-20 NOTE — DISCHARGE SUMMARY
Patient underwent  Intrathecal pump refill and reprogramming procedure 09/20/2023. The pt will follow up in clinic. Discharged home. Discharge Dx Chronic pain syndrome

## 2023-09-20 NOTE — OP NOTE
09/20/2023  PREOPERATIVE DIAGNOSIS:     Chronic pain syndrome           POSTOPERATIVE DIAGNOSIS:  Chronic pain syndrome                                                             PROCEDURE:  Intrathecal pump refill and reprogramming        SURGEON: Dr Jaziel Pierson              The patient was identified in the exam room and the pump was interrogated using the Medtronic system.  The pump was noted to have 2.6 cc remaining of a solution of morphine 10 mg per mL.  The patient's abdomen was marked in the appropriate location and prepped and draped in usual sterile fashion.  A 40 mL Medtronic pump refill kit was utilized for this procedure.  After the patient abdomen was prepped and draped in the usual sterile fashion, the Medtronic 40 mL template was placed on the skin overlying the target area of the pump orifice.  A 22-gauge noncoring needle was then utilized to access the pump.  A 20 mL syringe was utilized to aspirate the contents of the pump. Actual removed 5.0cc.   The solution was clear with no signs of serous fluid or blood. The patient then had a 40 mL allotment of morphine 10 mg per mL injected incrementally using the syringe and filter.  The needle was removed with its tip intact and the patient tolerated the procedure well with no adverse events.  The pump was then reprogrammed using the Medtronic system. Next fill on/before 03/15/2023

## 2023-09-30 ENCOUNTER — EXTERNAL CHRONIC CARE MANAGEMENT (OUTPATIENT)
Dept: FAMILY MEDICINE | Facility: CLINIC | Age: 53
End: 2023-09-30
Payer: MEDICARE

## 2023-09-30 PROCEDURE — G0511 CCM/BHI BY RHC/FQHC 20MIN MO: HCPCS | Mod: ,,, | Performed by: FAMILY MEDICINE

## 2023-09-30 PROCEDURE — G0511 PR CHRONIC CARE MGMT, RHC OR FQHC ONLY, 20 MINS OR MORE: ICD-10-PCS | Mod: ,,, | Performed by: FAMILY MEDICINE

## 2023-10-02 RX ORDER — NAPROXEN SODIUM 220 MG/1
TABLET, FILM COATED ORAL
Qty: 90 TABLET | Refills: 3 | Status: SHIPPED | OUTPATIENT
Start: 2023-10-02

## 2023-10-06 RX ORDER — LISINOPRIL 40 MG/1
TABLET ORAL
Qty: 90 TABLET | Refills: 3 | Status: SHIPPED | OUTPATIENT
Start: 2023-10-06

## 2023-10-16 ENCOUNTER — PATIENT OUTREACH (OUTPATIENT)
Dept: ADMINISTRATIVE | Facility: HOSPITAL | Age: 53
End: 2023-10-16

## 2023-10-16 NOTE — PROGRESS NOTES
Population Health Chart Review & Patient Outreach Details:     Reason for Outreach Encounter:     []  Non-Compliant Report   [x]  Payor Report ([employer], PHN, BCBS, MSSP, MCIP, [employer], etc.)   []  Pre-Visit Chart Review     Updates Requested / Reviewed:     []  Care Everywhere    []     []  External Sources ([facility], [facility], [employer], etc.)   []  Care Team Updated    Patient Outreach Method:    []  Telephone Outreach Completed   [] Successful   [] Left Voicemail   [] Unable to Contact (wrong number, no voicemail)  []  [facility] Outreach Sent  []  Letter Outreach Mailed  []  Fax Sent for External Records  [x]  External Records Upload    Health Maintenance Topics Addressed and Outreach Outcomes / Actions Taken:        []      Breast Cancer Screening []  Mammo Scheduled      []  External Records Requested     []  Added Reminder to Complete to Upcoming Primary Care Appt Notes     []  Patient Declined     []  Patient Will Call Back to Schedule     []  Patient Will Schedule with External Provider / Order Routed if Applicable             []       Cervical Cancer Screening []  Pap Scheduled      []  External Records Requested     []  Added Reminder to Complete to Upcoming Primary Care Appt Notes     []  Patient Declined     []  Patient Will Call Back to Schedule     []  Patient Will Schedule with External Provider               [x]          Colorectal Cancer Screening []  Colonoscopy Case Request or Referral Placed     []  External Records Requested     []  Added Reminder to Complete to Upcoming Primary Care Appt Notes     []  Patient Declined     []  Patient Will Call Back to Schedule     []  Patient Will Schedule with External Provider     []  Fit Kit Mailed (add the SmartPhrase under additional notes)     []  Reminded Patient to Complete Home Test             []      Diabetic Eye Exam []  Eye Camera Scheduled or Optometry Referral Placed     []  External Records Requested     []  Added Reminder to Complete to  Upcoming Primary Care Appt Notes     []  Patient Declined     []  Patient Will Call Back to Schedule     []  Patient Will Schedule with External Provider             []      Blood Pressure Control []  Primary Care Follow Up Visit Scheduled     []  Remote Blood Pressure Reading Captured     []  Added Reminder to Complete to Upcoming Primary Care Appt Notes     []  Patient Declined     []  Patient Will Call Back / Patient Will Send Portal Message with Reading     []  Patient Will Call Back to Schedule Provider Visit             []       HbA1c & Other Labs []  Lab Appt Scheduled for Due Labs     []  Primary Care Follow Up Visit Scheduled      []  Reminded Patient to Complete Home Test     []  Added Reminder to Complete to Upcoming Primary Care Appt Notes     []  Patient Declined     []  Patient Will Call Back to Schedule     []  Patient Will Schedule with External Provider / Order Routed if Applicable           []    Schedule Primary Care Appt []  Primary Care Appt Scheduled     []  Patient Declined     []  Patient Will Call Back to Schedule     []  Pt Established with External Provider & Updated Care Team             []      Medication Adherence []  Primary Care Appointment Scheduled     []  Added Reminder to Upcoming Primary Care Appt Notes     []  Patient Reminded to  Prescription     []  Patient Declined, Provider Notified if Needed     []  Sent Provider Message to Review and/or Add Exclusion to Problem List             []      Osteoporosis Screening []  DXA Appointment Scheduled     []  External Records Requested     []  Added Reminder to Complete to Upcoming Primary Care Appt Notes     []  Patient Declined     []  Patient Will Call Back to Schedule     []  Patient Will Schedule with External Provider / Order Routed if Applicable     Additional Care Coordinator Notes:     [employer] fit kit results done 11/20/2022/tp    Further Action Needed If Patient Returns Outreach:

## 2023-10-25 ENCOUNTER — ANESTHESIA EVENT (OUTPATIENT)
Dept: PAIN MEDICINE | Facility: HOSPITAL | Age: 53
End: 2023-10-25
Payer: MEDICARE

## 2023-10-25 ENCOUNTER — HOSPITAL ENCOUNTER (OUTPATIENT)
Facility: HOSPITAL | Age: 53
Discharge: HOME | End: 2023-10-25
Attending: ANESTHESIOLOGY | Admitting: ANESTHESIOLOGY
Payer: MEDICARE

## 2023-10-25 ENCOUNTER — ANESTHESIA (OUTPATIENT)
Dept: PAIN MEDICINE | Facility: HOSPITAL | Age: 53
End: 2023-10-25
Payer: MEDICARE

## 2023-10-25 VITALS
BODY MASS INDEX: 30.2 KG/M2 | DIASTOLIC BLOOD PRESSURE: 85 MMHG | RESPIRATION RATE: 18 BRPM | OXYGEN SATURATION: 100 % | WEIGHT: 248 LBS | TEMPERATURE: 98 F | HEART RATE: 65 BPM | SYSTOLIC BLOOD PRESSURE: 148 MMHG | HEIGHT: 76 IN

## 2023-10-25 DIAGNOSIS — M54.12 CERVICAL RADICULOPATHY: ICD-10-CM

## 2023-10-25 PROCEDURE — D9220A PRA ANESTHESIA: ICD-10-PCS | Mod: 23,,, | Performed by: NURSE ANESTHETIST, CERTIFIED REGISTERED

## 2023-10-25 PROCEDURE — D9220A PRA ANESTHESIA: Mod: 23,,, | Performed by: NURSE ANESTHETIST, CERTIFIED REGISTERED

## 2023-10-25 PROCEDURE — 27000716 HC OXISENSOR PROBE, ANY SIZE: Performed by: NURSE ANESTHETIST, CERTIFIED REGISTERED

## 2023-10-25 PROCEDURE — 62321 NJX INTERLAMINAR CRV/THRC: CPT | Performed by: ANESTHESIOLOGY

## 2023-10-25 PROCEDURE — 25000003 PHARM REV CODE 250: Performed by: ANESTHESIOLOGY

## 2023-10-25 PROCEDURE — 27000284 HC CANNULA NASAL: Performed by: NURSE ANESTHETIST, CERTIFIED REGISTERED

## 2023-10-25 PROCEDURE — 63600175 PHARM REV CODE 636 W HCPCS: Performed by: ANESTHESIOLOGY

## 2023-10-25 PROCEDURE — 63600175 PHARM REV CODE 636 W HCPCS: Performed by: NURSE ANESTHETIST, CERTIFIED REGISTERED

## 2023-10-25 PROCEDURE — 25500020 PHARM REV CODE 255: Performed by: ANESTHESIOLOGY

## 2023-10-25 PROCEDURE — 25000003 PHARM REV CODE 250: Performed by: NURSE ANESTHETIST, CERTIFIED REGISTERED

## 2023-10-25 PROCEDURE — 37000008 HC ANESTHESIA 1ST 15 MINUTES: Performed by: ANESTHESIOLOGY

## 2023-10-25 RX ORDER — LIDOCAINE HYDROCHLORIDE 20 MG/ML
INJECTION, SOLUTION EPIDURAL; INFILTRATION; INTRACAUDAL; PERINEURAL
Status: DISCONTINUED | OUTPATIENT
Start: 2023-10-25 | End: 2023-10-25

## 2023-10-25 RX ORDER — PROPOFOL 10 MG/ML
VIAL (ML) INTRAVENOUS
Status: DISCONTINUED | OUTPATIENT
Start: 2023-10-25 | End: 2023-10-25

## 2023-10-25 RX ORDER — BUPIVACAINE HYDROCHLORIDE 2.5 MG/ML
INJECTION, SOLUTION EPIDURAL; INFILTRATION; INTRACAUDAL CODE/TRAUMA/SEDATION MEDICATION
Status: DISCONTINUED | OUTPATIENT
Start: 2023-10-25 | End: 2023-10-25 | Stop reason: HOSPADM

## 2023-10-25 RX ORDER — TRIAMCINOLONE ACETONIDE 40 MG/ML
INJECTION, SUSPENSION INTRA-ARTICULAR; INTRAMUSCULAR CODE/TRAUMA/SEDATION MEDICATION
Status: DISCONTINUED | OUTPATIENT
Start: 2023-10-25 | End: 2023-10-25 | Stop reason: HOSPADM

## 2023-10-25 RX ORDER — SODIUM CHLORIDE 9 MG/ML
500 INJECTION, SOLUTION INTRAVENOUS CONTINUOUS
Status: DISCONTINUED | OUTPATIENT
Start: 2023-10-25 | End: 2023-10-25 | Stop reason: HOSPADM

## 2023-10-25 RX ORDER — IOPAMIDOL 612 MG/ML
INJECTION, SOLUTION INTRATHECAL CODE/TRAUMA/SEDATION MEDICATION
Status: DISCONTINUED | OUTPATIENT
Start: 2023-10-25 | End: 2023-10-25 | Stop reason: HOSPADM

## 2023-10-25 RX ADMIN — PROPOFOL 100 MG: 10 INJECTION, EMULSION INTRAVENOUS at 10:10

## 2023-10-25 RX ADMIN — SODIUM CHLORIDE: 9 INJECTION, SOLUTION INTRAVENOUS at 09:10

## 2023-10-25 RX ADMIN — LIDOCAINE HYDROCHLORIDE 100 MG: 20 INJECTION, SOLUTION INTRAVENOUS at 09:10

## 2023-10-25 RX ADMIN — PROPOFOL 100 MG: 10 INJECTION, EMULSION INTRAVENOUS at 09:10

## 2023-10-25 NOTE — H&P
"[facility] - Pain Management  Pain Management  H&P    Patient Name: Jesse Andrade  MRN: [id number]  Admission Date: 10/25/2023  Primary Care Provider: Otilio Vargas MD    Patient information was obtained from .     Subjective:     Principal Problem:Mary Torres FNP  [address]  [place] [address]  [phone]          RE: Jesse Andrade  : 1970  Date of Service: 10/3/2023  Existing Patient      Chief Complaint:  Neck pain described as aching, constant, dull, sharp; Location bilaterally, at the midline, radiating to shoulder(s); aggravated by activity, poor posture; relieved by narcotics, rest-lying down; onset gradual, constant; reported as 6/10 on pain scale,  Back pain achy in nature, constant, dull; located in the lumbar region, in the midline; aggravated by activity, standing, bending, lifting, walking, weather/temperature change; relieved by analgesics, rest; gradual in occurrence; pain rated as 4/10 on the pain scale,  Knee pain Location right patella; characterized as aching aggravated by walking Timing constant Severity moderate to severe.  Patient seated in room 10 with c/o neck and right hip pain, reports pain is unchanged since last visit  Did not have procedure        [place] Prescription Monitoring Program data was reviewed for this patient for the past 12 calendar months to ascertain any current, or past use of scheduled medications.  History of Present Illness:  What part of the body? neck and right hip  Pain level at best 3; Pain level at worst 6; Pain level at present 7; Pain level on average 6  54 y/o BM with complaints of "low back pain and Right knee pain"; objective data essentially unchanged from previous visit; he was able to have his pump refilled but has caused him some terrible side effects after pump refill; states that he felt like he had withdrawal symptoms but then he was thinking that he had the Flu; he did have his pump checked and it was " working good; he is now having more left side neck pain with numbness to fingers; we will schedule left ARTHUR; states that pain improved by 80% after Bilateral L4-L5 RFTC and still continues to do well with lower back pain relief; states that pain improved by 85% after cervical ARTHUR but will occasionally have some LUE tingling that lasted for several weeks but is now ready to have an additional injection to help with neck pain without radicular symptoms; his last cervical RF was June 2022; he was able to have his Lumbar RFTC done in Dec but only did the Right side because he took his ASA that morning; he actually had about 80% improvement of pain; pain is only worse with increased movements and first in the morning but improves throughout the day; he has already had his pump refill on 10/05 as well as #2 MBNB that improved by 85% that lasted for a couple of weeks; states that pain improved by 90% that lasted about 2 weeks after Bilateral L2-L5 MBNB #1; he has just recently had his Cervical RFTC in June that has improved his pain and is not having much pain at all to his cervical spine; states that pain improved by 80% after Left C3-C7 RFTC in Nov 2021; overall, pain has been about the same except for some worsening lower back pain; states that pain improved by 80% after Bilateral C3-C7 MBNB that lasted for about a week; pain does get better with movement but tingling to his fingers has improved; we will consider ARTHUR on return if tingling persists/returns but this has improved since last procedure; pain improved by 85% after Bilateral L3-L5 MBNB in 09/2021 that lasted for several months and is worse now with bending and moving; he was able to have his CT Lspine 01/2021 that revealed some changes but the last injection helped with pain; he was also able to have his pump refilled in Oct and will be due again in April;  states that he has started walking some but causes worsening pain; states that pain improved by 75%  after Bilateral C3-C7 MBNB in Dec that lasted for several months but pain is slowly returning and is ready to schedule a neck injection to help with radicular symptoms at this time; denies any radiation of pain into legs and no numbness or tingling to upper or lower extremities; exercise has helped some but pain is still there; states that neck pain has been worse the past several months that wakes him up at night; he is now having more pain that prevents sleeping and from doing every day activity; states that pain improved by 75% after his Bilateral C3-C7 RFTC that lasted for several months; states that pain improved after Bilateral C3-C7 #1 about 80% and then has had a 80% improvement in pain after his Bilateral C3-C7 FI#2 as well as had 50% improvement of ADL's; He has been going to physical therapy after having a R knee replacement in Jan 2019 by Dr. Garcia and has had more pain to knee but Percocet is helping better with that pain as well; he is still exercising some but continues to have pain and swelling; usually ice, rest and meds help with this; he sees Dr. Garcia again in July 2020 but feels like it is not healing as well as it should; he has been going to the gym to help with pain and strengthening; states he has been doing well and is better today since last visit after having his last injection; He describes the pain as dull pain that is constant; he has also started taking testosterone injections at home and still has 1 RF left at pharmacy; denies any issues with constipation; He has been experiencing some tingling and numbness to his R thigh; he started Neurontin 300mg BID since last visit and states it is helping with the numbness and tingling sensation and request to continue with 600 mg daily; also states that the Flexeril has helped better with muscle spasms; he is due for his next pump refill          UDS: consistent x 20; inconsistent x 3 (no percocet, +Norco that he had left over from the past  but instructed that this can not happen again) UDS due today     The previous urine drug screen was evaluated, and it was compliant for the medications that has been prescribed. A presumptive urine drug screen was done today to rapidly obtain and integrate results into clinical assessment and decision-making for ongoing safe prescribing of controlled substances. The results of the presumptive UDS done today was positive for opiates. He is prescribed oxycodone. Because presumptive UDS positive results are not definitive due to sensitivity and specificity and cross reactivity limitations and negative results do not necessarily indicate absence of drugs or substances in the urine specimen, confirmation will identify specific prescribed and non-prescribed medications or illicit use for ongoing safe prescribing of controlled substances including benzodiazepines, opioid agonist, opioids antagonist, partial agonist, stimulants, muscle relaxers, antidepressants, sleep aids, anti-seizure medicine, and alcohol. Urine drug analysis is used to assist with diagnosis and therapeutic decision-making concerning pretreatment assessment. Intensity and frequency of monitoring with urine drug testing will be based on the risk stratification method in determining risk level for opioid addiction.         Meds: Percocet --due 10/11/2023; requests refills on meds and states that meds help with his pain; no misuse of meds and no opioid abuse;  reviewed and is appropriate; he is currently prescribed 30 mg of morphine equivalent meds/day    Nursing:  Pain Medication/Dose/Last Taken/# Taken  ms pump    oxycodone    pain level with medication is  4/10 and without is a 8/10        Is it helping? Yes  Physical Therapy  no Home Exercises  Is it helping? Yes    no New medical problems or surgeries  no New medications  no New allergies  no New antibiotics, fever, infection  Allergies:  Allergies Reviewed - 10/03/23 10:14:47 AM  "CST  Keflex  Current Medications:  Medications List Reviewed (10/03/23 10:14:41 AM CST)  Diclofenac Sodium External Gel 1 % (2023) Apply 2 gram applications three times a day for 30 day(s)  Cyclobenzaprine HCl Oral Tablet 10 MG (2023) Take 1 tablet twice a day as needed for 30 day(s)  Gabapentin Oral Tablet 600 MG (2023) Take 1 tablet twice a day for 30 day(s)  Vitamin D3 Oral Capsule 1.25 MG (85757 UT) (2023) Take 1 capsule once a week for 4 week(s)  Percocet Oral Tablet  MG (10/3/2023) Take 1 tablet twice a day as needed for 30 day(s)  Testosterone Cypionate Intramuscular Solution 200 MG/ML (2023) Inject 1/2 milliliter IM every 72 hours  Morphine Sulfate Intramuscular Device 10 MG/0.7ML (2017) Morphine Pain Pump  Aspirin Oral Tablet Delayed Release 325 MG (2017) Take 1 tablet twice a week for 30 week(s)  Atorvastatin Calcium Oral Tablet 10 MG (2017) Take 1 tablet once a day for 30 day(s)  Previous Studies:  CT SCAN  Final Report  CT CTIC SPINE LUMBAR W/O CONTRAST    Show Printer-Friendly Version with Images (4 of 5)  Show Printer-Friendly Version without images   Patient Name: Jesse Andrade  : Mar-  ID: [id number]([facility])  Study Date:  08:03        Studies- CTIC SPINE LUMBAR W/O CONTRAST Indications- Lumbar radiculopathy. Loose leads on pain pump Comparison- None. Technique- CT of the lumbar spine was performed without the administration of intravenous contrast. Axial, sagittal, and coronal series were submitted for interpretation. Findings- Alignment of the lumbar vertebral bodies is normal. Intervertebral disc spaces as well as vertebral body heights are well maintained throughout the lumbar spine. Facet joints have normal anatomic relationships and minimal degenerative change. No acute fractures are demonstrated. The imaged intra-abdominal and intrapelvic contents demonstrate no evidence of acute pathology. Visualized portion of the "lead" " demonstrates no evidence of discontinuity. Multiple enlarged lymph nodes are demonstrated within the ileocolic mesentery. These measure 7 mm in short axis dimension. Additional enlarged lymph nodes in the paracaval region measuring up to 6-7 mm. Borderline bilateral intrapelvic lymph nodes measuring up to 8 mm short axis dimension are demonstrated. Conclusion- 1. Multiple intrapelvic and mesenteric lymph nodes are demonstrated which are borderline in size to minimally enlarged. When compared to additional imaging of the abdomen and pelvis performed 2014, these lymph nodes appear to have minimally increased in size as well as number. Significance of these lymph nodes and relevant to patient's back pain is uncertain. Correlation recommended. 2. No significant abnormality of the lumbar spine is demonstrated. 3. No break in continuity of the demonstrated lead is present. 2016 8-30 AM Ordering physician-SALINAS SINGH MD Point of service- [facility]. This report has been electronically signed by Ryley Altman - MARYBEL Kaplan Radiologist- RYLEY ALTMAN II, M.D. Releasing Radiologist- RYLEY ALTMAN II, M.D. Released Date Time- 16 0845 ------------------------------------------------------------------------------     Signed by: Serena Weiner    Signed on:  08:30    CT [facility] 2021  Impression:  1. no acute fracture or subluxation within the lumber spine  2. Mild degenerative disc disease and facet/unconvertebral arthropathy at L5/S1. Moderate symmetric bilateral neuroforaminal stenoses at L5/S1  3. Very mild to mild midline broad-based posterior intervertebral disc bulges from L3/L4-L5/S1 without high grade spinal canal stenosis       ; X-RAY  Final Report  CR XR RIBS LEFT WITH PA/AP CHEST    Show Printer-Friendly Version   Patient Name: Jesse Andrade  : Mar-  ID: [id number]([facility])  Study Date:  13:59        AP chest with  left rib detail. Indication- Fall, with left chest wall pain. The heart size is normal. There is elevation of the right hemidiaphragm, stable finding. Surgical clips in the right upper quadrant. Postsurgical changes in the cervical spine. No pneumothorax. No pleural effusion. No rib fracture is seen. Impression- No acute abnormality. Place of service- [facility] This report has been electronically signed by Mariah Kaplan Physician- MARIAH TIERNEY M.D. Releasing Dagmar TIERNEY M.D. Released Date Time- 18 1419 ------------------------------------------------------------------------------     Signed by: Serena Weiner    Signed on:  14:13  Final Report  CR CR SPINE CERVICAL AP AND LATERAL  Show Printer-Friendly Version Patient Name: Jesse Andrade  : Mar-  ID: [id number]([facility])  Study Date: Mar- 12:39     CR SPINE CERVICAL AP AND LATERAL    Indication- Cervicalgia    Comparison- Cervical spine x-ray Shirley 15, 2005    Technique- Frontal and lateral views of the cervical spine.    Findings-     There is straightening of normal cervical lordosis which may be  positional or secondary muscle spasm. There is no significant  anterolisthesis or retrolisthesis.  Anterior cervical fusion hardware at  C5-C7 with osseous fusion. Mild marginal osteophyte formation noted at  C3-4 and C4-5. The C7-T1 level is not well visualized on lateral view.  No gross evidence of significant vertebral body height loss.    IMPRESSION-    Degenerative change and malalignment of the cervical spine as detailed  above.Anterior cervical fusion hardware at C5-C7 with osseous fusion.       Point of Service- [facility]    This report has been electronically signed by Santhosh Boss  KnowReCHANTALE Kaplan Physician- SANTHOSH BATRES D.O.       Released Date Time- 19 1403    ------------------------------------------------------------------------------    Signed by: Serena Weiner Signed on: Mar- 13:58  ; Findings  LAB results from Dr. Daley:  2018  Vitamin D  10.8  Aug 2018  PSA  0.989  Testosterone 255  Total Testosterone 226  Free Testosterone 4.75     C spine X-ray 2019     Past Medical History:  The patient has a past medical history of  Hypertension, High Cholesterol, Osteoarthritis (OA), Joint Pain.  There is no past medical history of  Cardiac Pacer, Diabetes Mellitus type 1, Diabetes Mellitus type 2, Chronic Obstructive Pulmonary Disease, Rheumatoid Arthritis, Gastroesophageal Reflux Disease, Cerebrovascular Accident (CVA), Cardiovascular Disease, Alcoholism, Crohn's disease, Terminal Illness.  severe dementianutritional quality good  Social History:  Smoking Status: Light tobacco smoker; Last Reviewed: 10/03/2023  Pack-years: 1  Date quit smoking: 10 years ago  Alcohol use: Non-Drinker  Racial background:   Occupation: disabled  Marital status:   Patient knows the purpose/use of medications  Patient is taking medications as prescribed  Family History:  Father  History remarkable for  gun shot.   Age 22;      Mother  History remarkable for  Coronary Artery Disease.   Age 42;   Review of Systems:  General:  Patient denies  sweats, fatigue, fever, chills.  Ears, Nose and Throat:  Patient denies  hearing loss, ringing in the ears.  Cardiovascular:  Patient denies  chest pain.  Respiratory:  Patient denies  shortness of breath.  Gastrointestinal:  Patient denies  nausea, vomiting, diarrhea, constipation.  Genitourinary:  Patient denies  urinary frequency, prostate problems.  Endocrine:  Patient denies  thyroid problems.  Hematologic:  Patient denies  bleeding tendencies, easy bruising tendency.  Musculoskeletal:  Patient denies  joint pain, walking aids.  Neurologic  Patient denies  seizures,  "headache.  Psychologic:  Patient denies  anxiety, panic attacks, depression.  Skin:  Patient denies  skin rash.  DEPRESSION SCREENING:  Not at all the patient reports little interest or pleasure in doing things.  Not at all the patient reports feeling down, depressed, or hopeless.  Date Depression Screening Last Done: 02/13/2020  PHQ-2 Score 0; PHQ-9 Score incomplete  Several days the patient reports little interest or pleasure in doing things.  Several days the patient reports feeling down, depressed, or hopeless.  Date Depression Screening Last Done: 05/14/2019  Vital Signs:  Weight 256 lbs; Height 6 ft 4 in; BMI 31.2  10/03/2023 9:53 AM (CST)  Temperature 98.6 °F; Respiration Rate 18  10/03/2023 10:01 AM (CST)  Pulse Rate 110 bpm; Blood Pressure 158 / 117 mm/Hg; Pain Level: 8  Physical Examination:  Pre Anesthesia evaluation  Pre Op dx: cervical radiculopathy  Planned procedure: Cath guided C6-C7 ARTHUR with right bias  Age: 53  Ht: 6'4"  Wt: 237 lbs  BMI: 28.9  Allergies: Keflex  Meds/Labs/Test  Prior Surgeries:  Anethesia complications: none known     Medical History  CNS: _X_Neg.   __Seizures  __CVA  __TIA  __HA  __Depression  Cardiac: __Neg  __CAD  __Stents  __MI  _X_HTN  __CHF  Pulmonary: __Neg  __COPD  __Asthma  __Sleep Apnea  __Smoker PPD  __CPAP  GI:_X_Neg.  __Reflux  __Liver Dysfunction  __Hepatitis  __Hiatal Hernia  __Hepatitis  __ETOH  __GERD   Renal:  _X_Neg.  __CRI  __ESRD  Endocrine:  _X_Neg.  __Thyroid  __Diabetes  Heme:  _X_Neg.   __Blood thinners  __other     Cranial Nerves II-XII grossly intact.  No apparent distress.  Patient is alert and oriented times three.  No somnolence or slurred speech.         Lumbar spine has pain with flexion and extension lateral rotation  Lumbar facets are tender to palpation  No focal neurologic deficits noted  physical exam essentially unchanged from previous  Back Motion:  Lumbar / lumbosacral spine abnormal.  Additional Physical Findings:  General general " appearance normal appears comfortable,   Oriented to time, place and person, pleasant, seated  Not uncomfortable  Head normal head exam  Eyes normal eye exam  Chest normal chest exam  Respiratory normal respiratory exam  Musculoskeletal abnormal low back pain and knee pain,   Joint tenderness  Posture normal  Neurologic normal neurologic exam  Skin normal skin exam  Toxicology Report  Toxicology was performed.  Reason for Toxicology:  A presumptive urine drug screen was done today to rapidly obtain and integrate results into clinical assessment and decision-making for ongoing safe prescribing of controlled substances.  Test Date/Time: 10/03/2023 00:00  Tested By: EM  Oxycodone  (OXY): Result = Positive; Control = Positive  Morphine  (OPI): Result = Positive; Control = Positive  Amphetamines  (AMP): Result = Negative; Control = Positive  Oxazepam  (BZO): Result = Negative; Control = Positive  Methadone  (MTD): Result = Negative; Control = Positive  Secobarbital  (BAR): Result = Negative; Control = Positive  Tricyclic Antidepressants  (TCA): Result = Positive; Control = Positive  Nortriptyline  (TCA): Result = Positive; Control = Positive  Marijuana-Carboxy Tetrahydrocannabinoid   (THC): Result = Negative; Control = Positive  Cocaine  (MEDARDO): Result = Negative; Control = Positive  Ecstasy-Methylenedioxymethamphetamine  (MDMA): Result = Negative; Control = Positive  D Methamphetamine  (MET): Result = Negative; Control = Positive  Phencyclidine  (PCP): Result = Negative; Control = Positive  Adulterants  (OX, SG, pH): Result = Negative; Control = Positive  Assessment:  (M25.569) - Knee pain  (M54.50) - Low back pain  (Z79.891) - Opioid use agreement exists  (E29.1) - Testicular hypofunction  (M54.16) - Lumbar radiculopathy  (M47.812) - Cervical spondylosis without myelopathy  (M47.817) - Lumbosacral spondylosis  (M54.12) - Cervical radiculopathy  (M54.2) - Neck pain  (G89.4) - Chronic pain syndrome  Plan:  Follow up visit  2 months  -refilled Percocet -- due 10/11/2023    -refilled Testosterone with 2 RF (none due today)    -Sent rx for Flexeril 10 mg TID, Gabapentin 600 mg BID  and Voltaren gel to [facility] with 5 RF (due again in Nov)    -drink plenty of fluids and water    -increase fiber in diet    -call sooner with worsening pain    -pump refill was done on 0920/2023    -next alarm date will be  (will get this on return visit)     -schedule Left C6-7 cath guided ARTHUR at [facility] on 10/18/2023 at 12:30 pm    -refill Vit D 21823 unit weekly to [facility] with 5 RF    -will did receive cardiac clearance from Dr. Arteaga      -check blood work on return visit                    Patient has a medical problem of intermediate acuity. The medical condition is not life threatening but poses a significant impact on morbidity and/or impacts the patients activities of daily living. This procedure is medically necessary to reduce pain and improve functionality.       Indications for this procedure for this specific patient include the following:   - Pt has had symptoms for three months with moderate to severe pain with functional impairment rated of  /10 pain.   - Pain non-responsive to conservative care.  - Pain predominately axial and not associated with radiculopathy or claudication.  - No non-facet pathology as source of pain.  - Clinical assessment implicates facet joint as putative pain source.   - Pain is exacerbated by extension or prolonged sitting/standing and relieved by rest.   - No unexplained neurologic deficit.   - No history of coagulopathy , infection or unstable medical conditions.  - Pain is causing significant functional limitation resulting in diminished quality of life and impaired age appropriate ADL's.  - Repeat injections not done prior to 7 days.  - No more than 2 levels will be done per side.     NSAIDS Failure_YES___  Pain for 3 months or >_YES____  Pain level 6> intermittent or continuous__YES__  Physical exam with  documented signs that facets are the primary source of pain_YES___        Due to the presence of cervical radicular symptoms, we have elected to proceed with cervical epidural steroid injections at the level confirmed by physical examination and accompanying studies. This is medically necessary to improve function, reduce pain and limitations, and to reduce the reliance on pain medications. Additional epidural steroid injections will be based on patient improvement.     NARCOTIC STATEMENT  Patient is taking the narcotic pain medications as prescribed. Refill is being given because of the benefit to the patient in regards to the pain. Patient has agreed not to abuse of medication and not to take it more than what is prescribed. The nature of the drug including the potential for addiction and dependency and abuse was also discussed with the patient. Patient has developed physical dependency for the narcotic pain medication for his pain relief.  Patient has also developed tolerance to the sedative effect of the narcotic pain medications.  Patient has decided to continue with these medications despite potential for addiction as described by this office.  This was stressed to the patient that it is the patient's responsibility to secure the narcotic medication and in any event of loss for any reason whatsoever,  there will be no refill before the next due date. Patient also understands that they are not supposed to drive or work on machinery while taking these medications.  Also explained to the patient that in the event of traffic citation, the presence of this drug in  bloodstream may result in DUI.  Patient has been advised not to drink alcohol while taking this medication.  Patient has verbalized understanding of our office policy and has signed a contract with us in this regard.       Chief Complaint:      HPI:       Assessment/Plan:             Jaziel Pierson MD  Pain Management  [facility] - Pain  Management

## 2023-10-25 NOTE — ANESTHESIA PREPROCEDURE EVALUATION
10/25/2023  Jesse Andrade is a 53 y.o., male.      Pre-op Assessment    I have reviewed the Patient Summary Reports.     I have reviewed the Nursing Notes. I have reviewed the NPO Status.   I have reviewed the Medications.     Review of Systems  Cardiovascular:   Hypertension hyperlipidemia Non ischemic cardiomyopathy   Neurological:   Chronic Pain Syndrome       Physical Exam  General: Well nourished, Cooperative, Alert and Oriented    Airway:  Mallampati: III   Mouth Opening: Normal  TM Distance: 4 - 6 cm  Tongue: Large  Neck ROM: Normal ROM    Dental:  Intact        Anesthesia Plan  Type of Anesthesia, risks & benefits discussed:    Anesthesia Type: Gen Natural Airway, MAC  Intra-op Monitoring Plan: Standard ASA Monitors  Post Op Pain Control Plan: multimodal analgesia and IV/PO Opioids PRN  Induction:  IV  Informed Consent: Informed consent signed with the Patient and all parties understand the risks and agree with anesthesia plan.  All questions answered. Patient consented to blood products? Yes  ASA Score: 3  Day of Surgery Review of History & Physical: I have interviewed and examined the patient. I have reviewed the patient's H&P dated: There are no significant changes.     Ready For Surgery From Anesthesia Perspective.     .   Past Medical History:   Diagnosis Date    Arthritis     Hyperlipidemia     Hypertension     Sleep apnea        Past Surgical History:   Procedure Laterality Date    CERVICAL SPINE SURGERY      fusion    EPIDURAL STEROID INJECTION INTO CERVICAL SPINE Right 6/7/2023    Procedure: INJECTION, STEROID, SPINE, CERVICAL, EPIDURAL;  Surgeon: Jaziel Pierson MD;  Location: [facility];  Service: Pain Management;  Laterality: Right;  C6-7 cath guided ARTHUR with right bias    FACIAL FRACTURE SURGERY      INJECTION OF ANESTHETIC AGENT AROUND MEDIAL BRANCH NERVES  INNERVATING LUMBAR FACET JOINT Bilateral 10/26/2022    Procedure: BLOCK, NERVE, FACET JOINT, LUMBAR, MEDIAL BRANCH;  Surgeon: Jaziel Pierson MD;  Location: [facility];  Service: Pain Management;  Laterality: Bilateral;  Bilateral L2-5 MBNB    RADIOFREQUENCY ABLATION OF LUMBAR MEDIAL BRANCH NERVE AT SINGLE LEVEL Right 12/14/2022    Procedure: RADIOFREQUENCY ABLATION, NERVE, SPINAL, LUMBAR, MEDIAL BRANCH, 1 LEVEL;  Surgeon: Jaziel Pierson MD;  Location: [facility];  Service: Pain Management;  Laterality: Right;  Right L3-5 RFTC    RADIOFREQUENCY ABLATION OF LUMBAR MEDIAL BRANCH NERVE AT SINGLE LEVEL Bilateral 7/26/2023    Procedure: RADIOFREQUENCY ABLATION, NERVE, SPINAL, LUMBAR, MEDIAL BRANCH, 1 LEVEL;  Surgeon: Jaziel Pierson MD;  Location: [facility];  Service: Pain Management;  Laterality: Bilateral;  Bilateral L3-5 RFTC    REFILL PAIN PUMP N/A 4/14/2021    Procedure: REFILLING, ANALGESIC PUMP;  Surgeon: Jaziel Pierson MD;  Location: [facility];  Service: Pain Management;  Laterality: N/A;  pump refill    REFILL PAIN PUMP N/A 10/27/2021    Procedure: REFILLING, ANALGESIC PUMP;  Surgeon: Jaziel Pierson MD;  Location: [facility];  Service: Pain Management;  Laterality: N/A;  Pump refill    REFILL PAIN PUMP N/A 4/13/2022    Procedure: REFILLING, ANALGESIC PUMP;  Surgeon: Jaziel Pierson MD;  Location: [facility];  Service: Pain Management;  Laterality: N/A;  Pump refill    REFILL PAIN PUMP N/A 10/5/2022    Procedure: REFILLING, ANALGESIC PUMP;  Surgeon: Jaziel Pierson MD;  Location: [facility];  Service: Pain Management;  Laterality: N/A;  Pump refill    REFILL PAIN PUMP N/A 3/29/2023    Procedure: REFILLING, ANALGESIC PUMP;  Surgeon: Jaziel Pierson MD;  Location: [facility];  Service: Pain Management;  Laterality: N/A;  Pump refill    REFILL PAIN PUMP N/A 9/20/2023    Procedure: REFILLING, ANALGESIC  PUMP;  Surgeon: Jaziel Pierson MD;  [facility];  Service: Pain Management;  Laterality: N/A;  pump refill    TOTAL KNEE ARTHROPLASTY Bilateral        Family History   Problem Relation Age of Onset    Coronary artery disease Mother        Social History     Socioeconomic History    Marital status:    Tobacco Use    Smoking status: Former     Current packs/day: 0.75     Average packs/day: 0.8 packs/day for 10.0 years (7.5 ttl pk-yrs)     Types: Cigarettes     Passive exposure: Past    Smokeless tobacco: Current     Types: Snuff   Substance and Sexual Activity    Alcohol use: Never       Current Facility-Administered Medications   Medication Dose Route Frequency Provider Last Rate Last Admin    0.9%  NaCl infusion  500 mL Intravenous Continuous Jaziel Pierson MD           Review of patient's allergies indicates:   Allergen Reactions    Keflex [cephalexin]        Patient Active Problem List   Diagnosis    Chronic pain syndrome    Non-ischemic cardiomyopathy    Hypertension    Hyperlipidemia

## 2023-10-25 NOTE — OP NOTE
10/25/2023  PREOPERATIVE DIAGNOSIS:     Cervical Radiculopathy                                                                Neck Pain         POSTOPERATIVE DIAGNOSIS:  Cervical Radiculopathy                                                                Neck Pain         PROCEDURE:  Catheter Guided Cervical Epidural Steroid Injection under Fluoroscopic Guidance at C 6-7level.          SURGEON: Dr Jaziel Pierson    COMPLICATIONS: None    ANESTHESIA:  MAC    DRAINS AND PACKS:  None    BLOOD LOSS:  None         The patient was identified in the holding area.  The risks and benefits of the procedure were again explained to the patient and the patient agreed to proceed.  The patient was taken in stable condition to the procedure room and was placed in prone position on the C-Arm table.  All pressure points were checked and padded comfortably while the patient was awake.  Time out was completed.  Standard ASA monitors applied.  Anesthesia was initiated.  Patient was comfortable and the neck was then prepped and draped in usual sterile fashion. The skin wheal  using Bupivacaine 0.25% (2.5 mg/ml) 1cc was raised over the C7-T1 interspaces and an 18 gauge needle was advanced under fluoroscopic guidance into the epidural space with loss of resistance confirmed with air.  The stylet was removed and there was negative aspiration of heme and CSF.  A Versicath Catheter was advanced to the target level at  C 6-7  level. The patient then received a 2 cc allotment of Isovue M 300 contrast with excellent delineation within the epidural space.  After confirmation and appropriate placement of the cannula, the patient then received  Kenalog 40mg/ml 1ml with 2ccs of 0.25% bupivacaine(2.5mg/ml) and 2ccs of preservative free Saline.  The needle was removed with tip intact.  There was adequate hemostasis at the conclusion of the procedure. The patient was taken in stable condition to the holding area and monitored for the appropriate time  of convalescence.  The patient tolerated the procedure well with no adverse events.          The patients preoperative pain score was  8/10.     The postoperative pain score is  /10.

## 2023-10-25 NOTE — DISCHARGE SUMMARY
Patient underwent Catheter Guided Cervical Epidural Steroid Injection under Fluoroscopic Guidance at  6-7level procedure 10/25/2023. The pt will follow up in clinic. Discharged home. Discharge Dx:Cervical Radiculopathy

## 2023-10-25 NOTE — ANESTHESIA POSTPROCEDURE EVALUATION
Anesthesia Post Evaluation    Patient: Jesse Andrade    Procedure(s) Performed: Procedure(s) (LRB):  INJECTION, STEROID, SPINE, CERVICAL, EPIDURAL (Left)    Final Anesthesia Type: general      [facility]: [facility].  Patient participation: Yes- Able to Participate  Level of consciousness: awake and alert  Post-procedure vital signs: reviewed and stable  Pain management: adequate  Airway patency: patent    PONV status at discharge: No PONV  Anesthetic complications: no      Cardiovascular status: blood pressure returned to baseline, hemodynamically stable and stable  Respiratory status: unassisted  Hydration status: euvolemic  Follow-up not needed.  Comments: Pt voices appreciation for care          Vitals Value Taken Time   /82 10/25/23 1043   Temp 97.8 10/25/23 1232   Pulse 66 10/25/23 1043   Resp 11 10/25/23 1043   SpO2 100 % 10/25/23 1043   Vitals shown include unvalidated device data.      Event Time   Out of Recovery 10:40:00         Pain/Alexi Score: Alexi Score: 10 (10/25/2023 10:40 AM)

## 2023-10-25 NOTE — TRANSFER OF CARE
"Anesthesia Transfer of Care Note    Patient: Jesse Andrade    Procedure(s) Performed: Procedure(s) (LRB):  INJECTION, STEROID, SPINE, CERVICAL, EPIDURAL (Left)    Patient location: Other: [facility]    Anesthesia Type: general    Transport from OR: Transported from OR on room air with adequate spontaneous ventilation    Post pain: adequate analgesia    Post assessment: no apparent anesthetic complications    Post vital signs: stable    Level of consciousness: sedated and responds to stimulation    Nausea/Vomiting: no nausea/vomiting    Complications: none    Transfer of care protocol was followedComments: Good SV continue, NAD noted, VSS, RTRN      Last vitals:   Visit Vitals  /89 (BP Location: Right arm, Patient Position: Lying)   Pulse 79   Temp 36.6 °C (97.8 °F) (Oral)   Resp 16   Ht 6' 4" (1.93 m)   Wt 112.5 kg (248 lb)   SpO2 100%   BMI 30.19 kg/m²     "

## 2023-10-31 ENCOUNTER — EXTERNAL CHRONIC CARE MANAGEMENT (OUTPATIENT)
Dept: FAMILY MEDICINE | Facility: CLINIC | Age: 53
End: 2023-10-31
Payer: MEDICARE

## 2023-10-31 PROCEDURE — G0511 CCM/BHI BY RHC/FQHC 20MIN MO: HCPCS | Mod: ,,, | Performed by: FAMILY MEDICINE

## 2023-10-31 PROCEDURE — G0511 PR CHRONIC CARE MGMT, RHC OR FQHC ONLY, 20 MINS OR MORE: ICD-10-PCS | Mod: ,,, | Performed by: FAMILY MEDICINE

## 2023-11-30 ENCOUNTER — EXTERNAL CHRONIC CARE MANAGEMENT (OUTPATIENT)
Dept: FAMILY MEDICINE | Facility: CLINIC | Age: 53
End: 2023-11-30
Payer: MEDICARE

## 2023-11-30 PROCEDURE — G0511 CCM/BHI BY RHC/FQHC 20MIN MO: HCPCS | Mod: ,,, | Performed by: FAMILY MEDICINE

## 2023-11-30 PROCEDURE — G0511 PR CHRONIC CARE MGMT, RHC OR FQHC ONLY, 20 MINS OR MORE: ICD-10-PCS | Mod: ,,, | Performed by: FAMILY MEDICINE

## 2023-12-20 ENCOUNTER — ANESTHESIA (OUTPATIENT)
Dept: PAIN MEDICINE | Facility: HOSPITAL | Age: 53
End: 2023-12-20
Payer: MEDICARE

## 2023-12-20 ENCOUNTER — ANESTHESIA EVENT (OUTPATIENT)
Dept: PAIN MEDICINE | Facility: HOSPITAL | Age: 53
End: 2023-12-20
Payer: MEDICARE

## 2023-12-20 ENCOUNTER — HOSPITAL ENCOUNTER (OUTPATIENT)
Facility: HOSPITAL | Age: 53
Discharge: HOME | End: 2023-12-20
Attending: ANESTHESIOLOGY | Admitting: ANESTHESIOLOGY
Payer: MEDICARE

## 2023-12-20 VITALS
BODY MASS INDEX: 30.32 KG/M2 | HEART RATE: 60 BPM | DIASTOLIC BLOOD PRESSURE: 78 MMHG | OXYGEN SATURATION: 98 % | SYSTOLIC BLOOD PRESSURE: 137 MMHG | TEMPERATURE: 97 F | RESPIRATION RATE: 11 BRPM | WEIGHT: 249 LBS | HEIGHT: 76 IN

## 2023-12-20 DIAGNOSIS — M47.812 CERVICAL SPONDYLOSIS: ICD-10-CM

## 2023-12-20 PROCEDURE — 27000284 HC CANNULA NASAL: Performed by: NURSE ANESTHETIST, CERTIFIED REGISTERED

## 2023-12-20 PROCEDURE — D9220A PRA ANESTHESIA: Mod: 23,,, | Performed by: NURSE ANESTHETIST, CERTIFIED REGISTERED

## 2023-12-20 PROCEDURE — 63600175 PHARM REV CODE 636 W HCPCS: Performed by: ANESTHESIOLOGY

## 2023-12-20 PROCEDURE — D9220A PRA ANESTHESIA: ICD-10-PCS | Mod: 23,,, | Performed by: NURSE ANESTHETIST, CERTIFIED REGISTERED

## 2023-12-20 PROCEDURE — 63600175 PHARM REV CODE 636 W HCPCS: Performed by: NURSE ANESTHETIST, CERTIFIED REGISTERED

## 2023-12-20 PROCEDURE — 25000003 PHARM REV CODE 250: Performed by: NURSE ANESTHETIST, CERTIFIED REGISTERED

## 2023-12-20 PROCEDURE — 64490 INJ PARAVERT F JNT C/T 1 LEV: CPT | Mod: 50 | Performed by: ANESTHESIOLOGY

## 2023-12-20 PROCEDURE — 64491 INJ PARAVERT F JNT C/T 2 LEV: CPT | Mod: 50 | Performed by: ANESTHESIOLOGY

## 2023-12-20 PROCEDURE — 37000008 HC ANESTHESIA 1ST 15 MINUTES: Performed by: ANESTHESIOLOGY

## 2023-12-20 RX ORDER — BUPIVACAINE HYDROCHLORIDE 2.5 MG/ML
INJECTION, SOLUTION INFILTRATION; PERINEURAL CODE/TRAUMA/SEDATION MEDICATION
Status: DISCONTINUED | OUTPATIENT
Start: 2023-12-20 | End: 2023-12-20 | Stop reason: HOSPADM

## 2023-12-20 RX ORDER — LIDOCAINE HYDROCHLORIDE 20 MG/ML
INJECTION, SOLUTION EPIDURAL; INFILTRATION; INTRACAUDAL; PERINEURAL
Status: DISCONTINUED | OUTPATIENT
Start: 2023-12-20 | End: 2023-12-20

## 2023-12-20 RX ORDER — PROPOFOL 10 MG/ML
INJECTION, EMULSION INTRAVENOUS
Status: DISCONTINUED | OUTPATIENT
Start: 2023-12-20 | End: 2023-12-20

## 2023-12-20 RX ADMIN — SODIUM CHLORIDE: 9 INJECTION, SOLUTION INTRAVENOUS at 12:12

## 2023-12-20 RX ADMIN — LIDOCAINE HYDROCHLORIDE 50 MG: 20 INJECTION, SOLUTION INTRAVENOUS at 12:12

## 2023-12-20 RX ADMIN — PROPOFOL 100 MG: 10 INJECTION, EMULSION INTRAVENOUS at 12:12

## 2023-12-20 NOTE — PLAN OF CARE
REFER TO WRITTEN DOCUMENT AND RECOVERY INFORMATION.    D/CD PATIENT [facility] WHEELCHAIR AT .    INFORMED PATIENT IF UNABLE TO VOID IN 8 HOURS, GO TO ER. NOTIFY MD OF REDNESS OR DRAINAGE FROM INJECTION SITE OR FEVER OVER 3-4 DAY. REST AND DRINK PLENTY OF FLUIDS FOR THE REMAINDER OF THE DAY. NO LIFTING OVER 5 LBS FOR THE REMAINDER OF THE DAY. CONTINUE REGULAR MEDICATIONS AS PRESCRIBED. MAY TAKE PAIN MEDICATION AS PRESCRIBED.     PAIN IMPROVED  100%  Preop pain 8.  Postop pain 0.

## 2023-12-20 NOTE — ANESTHESIA POSTPROCEDURE EVALUATION
Anesthesia Post Evaluation    Patient: Jesse Andrade    Procedure(s) Performed: Procedure(s) (LRB):  BLOCK, NERVE, FACET JOINT, CERVICAL, MEDIAL BRANCH (Bilateral)    Final Anesthesia Type: general      Patient location during evaluation: PACU  Patient participation: Yes- Able to Participate  Level of consciousness: awake and alert  Post-procedure vital signs: reviewed and stable  Pain management: adequate  Airway patency: patent    PONV status at discharge: No PONV  Anesthetic complications: no      Cardiovascular status: blood pressure returned to baseline  Respiratory status: unassisted  Hydration status: euvolemic  Follow-up not needed.              Vitals Value Taken Time   /74 12/20/23 1329   Temp 36.1 °C (97 °F) 12/20/23 1259   Pulse 55 12/20/23 1330   Resp 11 12/20/23 1330   SpO2 98 % 12/20/23 1330   Vitals shown include unvalidated device data.      No case tracking events are documented in the log.      Pain/Alexi Score: Alexi Score: 10 (12/20/2023  1:20 PM)

## 2023-12-20 NOTE — OP NOTE
12/20/2023  Jesse Andrade 1970    PRE-OPERATIVE DIAGNOSIS:   Cervical Spondylosis without Myelopathy                                                              Neck Pain     POST-OPERATIVE DIAGNOSIS:   Cervical Spondylosis without Myelopathy                                                              Neck Pain     PROCEDURE:  Bilateral Cervical Medial Branch Nerve Block C3-5     COMPLICATIONS:  None  DRAINS AND PACKS:  None  BLOOD LOSS:  None  ANESTHESIA:   MAC     The patient was identified in the holding area. The risks and benefits of the procedure were again explained to the patient and he agreed to proceed.   Patient was taken in stable condition to the operating room and was placed prone on the C-arm table.  All pressure points were checked and padded comfortably while the patient was awake.  The patient's neck was prepped and draped in usual sterile fashion.  Standard ASA monitors were applied and monitored throughout the procedure.  Time out was completed.  Anesthesia was initiated.  The C-arm was brought into the true AP position to identify the waist of the vertebral bodies from C3-C5 on the left.  The C-arm was then obliqued in serial fashion and used to identify the eye of the Scottie dog formation.  A skin weal using Bupivacaine 0.25% (2.5mg/ml) 1 ml was raised and a 22-gauge 3 ½ inch spinal needle was advanced down into the articular surface at each level in serial fashion and was identified. The stylets were removed.   A 1.5 cc of Bupivacaine 0.25% (2.5mg/ml) was administered at each level. The patient tolerated the procedure well with no adverse events.  The needle was removed with its tip intact. The procedure was repeated on the right as described above.  There was adequate hemostasis at the conclusion of procedure. The patient was taken in stable condition to the holding area where he was monitored for the appropriate time of convalescence and discharged to the care of the patients  .        preoperative pain score was  8/10.    postoperative pain score was a  /10.

## 2023-12-20 NOTE — DISCHARGE SUMMARY
Jesse Andrade underwent  Bilateral Cervical Medial Branch Nerve Block C3-5     procedure 12/20/2023. The pt will follow up in clinic. Discharged home. Discharge Dx: Cervical Spondylosis without Myelopathy

## 2023-12-20 NOTE — H&P
"[facility] - Pain Management  Pain Management  H&P    Patient Name: Jesse Andrade  MRN: [id number]  Admission Date: 2023  Primary Care Provider: Otilio Vargas MD    Patient information was obtained from     Subjective:     Principal Problem:  Mary Torres P  [address]  [place] [address]  [phone]                   RE: Jesse Andrade      : 1970   Date of Service: 2023   Existing Patient           Chief Complaint:   Neck pain described as aching, constant, dull, sharp; Location bilaterally, at the midline, radiating to shoulder(s); aggravated by activity, poor posture; relieved by narcotics, rest-lying down; onset gradual, constant; reported as 6/10 on pain scale,  Back pain achy in nature, constant, dull; located in the lumbar region, in the midline; aggravated by activity, standing, bending, lifting, walking, weather/temperature change; relieved by analgesics, rest; gradual in occurrence; pain rated as 4/10 on the pain scale,  Knee pain Location right patella; characterized as aching aggravated by walking Timing constant Severity moderate to severe.   Patient seated in room 5 with c/o neck and right hip pain, reports pain is unchanged since last visit  Did not have procedure        [place] Prescription Monitoring Program data was reviewed for this patient for the past 12 calendar months to ascertain any current, or past use of scheduled medications.      History of Present Illness:   What part of the body? neck and right hip   Pain level at best 3; Pain level at worst 6; Pain level at present 7; Pain level on average 6   52 y/o BM with complaints of "low back pain and Right knee pain"; objective data essentially unchanged from previous visit; states that pain has not improved much at all since his last cervical ARTHUR but states that numbness to LUE and continues to have pain to neck; we will proceed with MBNB to help with neck pain; he was able to have his " pump refilled and will be due again in March; he is still having more left side neck pain with numbness to fingers; states that pain improved by 80% after Bilateral L4-L5 RFTC and still continues to do well with lower back pain relief; states that pain improved by 85% after cervical ARTHUR but will occasionally have some LUE tingling that lasted for several weeks but is now ready to have an additional injection to help with neck pain without radicular symptoms; his last cervical RF was June 2022; he was able to have his Lumbar RFTC done in Dec but only did the Right side because he took his ASA that morning; he actually had about 80% improvement of pain; pain is only worse with increased movements and first in the morning but improves throughout the day; he has already had his pump refill on 10/05 as well as #2 MBNB that improved by 85% that lasted for a couple of weeks; states that pain improved by 90% that lasted about 2 weeks after Bilateral L2-L5 MBNB #1; he has just recently had his Cervical RFTC in June that has improved his pain and is not having much pain at all to his cervical spine; states that pain improved by 80% after Left C3-C7 RFTC in Nov 2021; overall, pain has been about the same except for some worsening lower back pain; states that pain improved by 80% after Bilateral C3-C7 MBNB that lasted for about a week; pain does get better with movement but tingling to his fingers has improved; we will consider ARTHUR on return if tingling persists/returns but this has improved since last procedure; pain improved by 85% after Bilateral L3-L5 MBNB in 09/2021 that lasted for several months and is worse now with bending and moving; he was able to have his CT Lspine 01/2021 that revealed some changes but the last injection helped with pain; he was also able to have his pump refilled in Oct and will be due again in April;  states that he has started walking some but causes worsening pain; states that pain improved by  75% after Bilateral C3-C7 MBNB in Dec that lasted for several months but pain is slowly returning and is ready to schedule a neck injection to help with radicular symptoms at this time; denies any radiation of pain into legs and no numbness or tingling to upper or lower extremities; exercise has helped some but pain is still there; states that neck pain has been worse the past several months that wakes him up at night; he is now having more pain that prevents sleeping and from doing every day activity; states that pain improved by 75% after his Bilateral C3-C7 RFTC that lasted for several months; states that pain improved after Bilateral C3-C7 #1 about 80% and then has had a 80% improvement in pain after his Bilateral C3-C7 FI#2 as well as had 50% improvement of ADL's; He has been going to physical therapy after having a R knee replacement in Jan 2019 by Dr. Garcia and has had more pain to knee but Percocet is helping better with that pain as well; he is still exercising some but continues to have pain and swelling; usually ice, rest and meds help with this; he sees Dr. Garcia again in July 2020 but feels like it is not healing as well as it should; he has been going to the gym to help with pain and strengthening; states he has been doing well and is better today since last visit after having his last injection; He describes the pain as dull pain that is constant; he has also started taking testosterone injections at home and still has 1 RF left at pharmacy; denies any issues with constipation; He has been experiencing some tingling and numbness to his R thigh; he started Neurontin 300mg BID since last visit and states it is helping with the numbness and tingling sensation and request to continue with 600 mg daily; also states that the Flexeril has helped better with muscle spasms; he is due for his next pump refill      UDS: consistent x 20; inconsistent x 3 (no percocet, +Norco that he had left over from the past  but instructed that this can not happen again) UDS due today   The previous urine drug screen was evaluated, and it was compliant for the medications that has been prescribed. A presumptive urine drug screen was done today to rapidly obtain and integrate results into clinical assessment and decision-making for ongoing safe prescribing of controlled substances. The results of the presumptive UDS done today was positive for opiates. He is prescribed oxycodone. Because presumptive UDS positive results are not definitive due to sensitivity and specificity and cross reactivity limitations and negative results do not necessarily indicate absence of drugs or substances in the urine specimen, confirmation will identify specific prescribed and non-prescribed medications or illicit use for ongoing safe prescribing of controlled substances including benzodiazepines, opioid agonist, opioids antagonist, partial agonist, stimulants, muscle relaxers, antidepressants, sleep aids, anti-seizure medicine, and alcohol. Urine drug analysis is used to assist with diagnosis and therapeutic decision-making concerning pretreatment assessment. Intensity and frequency of monitoring with urine drug testing will be based on the risk stratification method in determining risk level for opioid addiction.     Meds: Percocet --due 12/08/2023; requests refills on meds and states that meds help with his pain; no misuse of meds and no opioid abuse;  reviewed and is appropriate; he is currently prescribed 30 mg of morphine equivalent meds/day      Nursing:   Pain Medication/Dose/Last Taken/# Taken  ms pump  oxycodone  pain level with medication is  4/10 and without is a 8/10     Is it helping? Yes  Physical Therapy  no Home Exercises  Is it helping? Yes     no New medical problems or surgeries  no New medications  no New allergies  no New antibiotics, fever, infection      Allergies:   Allergies Reviewed - 12/06/23 10:46:17 AM CST  Keflex        Current Medications:   Medications List Reviewed (23 10:46:11 AM CST)  Ketorolac Tromethamine Injection Solution 60 MG/2ML (2023) From 2023 11:15 AM to 2023 11:30 AM  dexAMETHasone Sodium Phosphate Injection Solution 4 MG/ML (2023) From 2023 11:15 AM to 2023 11:30 AM  Diclofenac Sodium External Gel 1 % (2023) Apply 2 gram applications three times a day for 30 day(s)  Cyclobenzaprine HCl Oral Tablet 10 MG (2023) Take 1 tablet twice a day as needed for 30 day(s)  Gabapentin Oral Tablet 600 MG (2023) Take 1 tablet twice a day for 30 day(s)  Vitamin D3 Oral Capsule 1.25 MG (66099 UT) (2023) Take 1 capsule once a week for 4 week(s)  Percocet Oral Tablet  MG (2023) Take 1 tablet twice a day as needed for 30 day(s)  Testosterone Cypionate Intramuscular Solution 200 MG/ML (2023) Inject 1/2 milliliter IM every 72 hours  Morphine Sulfate Intramuscular Device 10 MG/0.7ML (2017) Morphine Pain Pump  Aspirin Oral Tablet Delayed Release 325 MG (2017) Take 1 tablet twice a week for 30 week(s)  Atorvastatin Calcium Oral Tablet 10 MG (2017) Take 1 tablet once a day for 30 day(s)      Previous Studies:  CT SCAN  Final Report  CT CTIC SPINE LUMBAR W/O CONTRAST    Show Printer-Friendly Version with Images (4 of 5)  Show Printer-Friendly Version without images Patient Name: Jesse Andrade   : Mar-   ID: [id number]([facility])   Study Date:  08:03             Studies- CTIC SPINE LUMBAR W/O CONTRAST Indications- Lumbar radiculopathy. Loose leads on pain pump Comparison- None. Technique- CT of the lumbar spine was performed without the administration of intravenous contrast. Axial, sagittal, and coronal series were submitted for interpretation. Findings- Alignment of the lumbar vertebral bodies is normal. Intervertebral disc spaces as well as vertebral body heights are well maintained throughout the lumbar spine. Facet joints have  "normal anatomic relationships and minimal degenerative change. No acute fractures are demonstrated. The imaged intra-abdominal and intrapelvic contents demonstrate no evidence of acute pathology. Visualized portion of the "lead" demonstrates no evidence of discontinuity. Multiple enlarged lymph nodes are demonstrated within the ileocolic mesentery. These measure 7 mm in short axis dimension. Additional enlarged lymph nodes in the paracaval region measuring up to 6-7 mm. Borderline bilateral intrapelvic lymph nodes measuring up to 8 mm short axis dimension are demonstrated. Conclusion- 1. Multiple intrapelvic and mesenteric lymph nodes are demonstrated which are borderline in size to minimally enlarged. When compared to additional imaging of the abdomen and pelvis performed February 4, 2014, these lymph nodes appear to have minimally increased in size as well as number. Significance of these lymph nodes and relevant to patient's back pain is uncertain. Correlation recommended. 2. No significant abnormality of the lumbar spine is demonstrated. 3. No break in continuity of the demonstrated lead is present. 9/21/2016 8-30 AM Ordering physician-SALINAS SINGH MD Point of service- [facility]. This report has been electronically signed by Ryley Altman - AMRYBEL Kaplan Radiologist- RYLEY ALTMAN II, M.D. Releasing Radiologist- RYLEY ALTMAN II, M.D. Released Date Time- 09/21/16 0845 ------------------------------------------------------------------------------     Signed by: Serena Weiner    Signed on: Sept- 08:30    CT [facility] 01/18/2021  Impression:  1. no acute fracture or subluxation within the lumber spine  2. Mild degenerative disc disease and facet/unconvertebral arthropathy at L5/S1. Moderate symmetric bilateral neuroforaminal stenoses at L5/S1  3. Very mild to mild midline broad-based posterior intervertebral disc bulges from L3/L4-L5/S1 without high grade spinal " canal stenosis    CT [facility]ine at [facility]  2023  Impression:  multilevel degenerative changes throughout the cervical spine; previous C5-C7 ACDF         ; X-RAY  Final Report  CR XR RIBS LEFT WITH PA/AP CHEST    Show Printer-Friendly Version Patient Name: Jesse Andrade   : Mar-   ID: [id number]([facility])   Study Date:  13:59             AP chest with left rib detail. Indication- Fall, with left chest wall pain. The heart size is normal. There is elevation of the right hemidiaphragm, stable finding. Surgical clips in the right upper quadrant. Postsurgical changes in the cervical spine. No pneumothorax. No pleural effusion. No rib fracture is seen. Impression- No acute abnormality. Place of service- [facility] This report has been electronically signed by Mariah Saucedo - MARYBEL Kaplan Physician- MARIAH SAUCEDO M.D. Releasing Physician- MARIAH SAUCEDO M.D. Released Date Time- 18 1419 ------------------------------------------------------------------------------     Signed by: Serena Weiner    Signed on:  14:13  Final Report  CR CR SPINE CERVICAL AP AND LATERAL  Show Printer-Friendly Version Patient Name: Jesse Andrade    : Mar-    ID: [id number]([facility])    Study Date: Mar- 12:39       CR SPINE CERVICAL AP AND LATERAL    Indication- Cervicalgia    Comparison- Cervical spine x-ray Shirley 15, 2005    Technique- Frontal and lateral views of the cervical spine.    Findings-     There is straightening of normal cervical lordosis which may be  positional or secondary muscle spasm. There is no significant  anterolisthesis or retrolisthesis.  Anterior cervical fusion hardware at  C5-C7 with osseous fusion. Mild marginal osteophyte formation noted at  C3-4 and C4-5. The C7-T1 level is not well visualized on lateral view.  No gross evidence of significant vertebral body height loss.    IMPRESSION-    Degenerative change and malalignment of the cervical  spine as detailed  above.Anterior cervical fusion hardware at C5-C7 with osseous fusion.       Point of Service- [facility]    This report has been electronically signed by Santhosh Lemus          -  MARYBEL Kaplan Physician- SANTHOSH LEMUS D.O.       Releasing Physician- SANTHOSH LEMUS D.O.       Released Date Time- 19 1409   ------------------------------------------------------------------------------    Signed by: Serena Weiner Signed on: Mar- 13:58  ; Findings  LAB results from Dr. Daley:  2018  Vitamin D  10.8  Aug 2018  PSA  0.989  Testosterone 255  Total Testosterone 226  Free Testosterone 4.75     C spine X-ray 2019      Past Medical History:   The patient has a past medical history of  Hypertension, High Cholesterol, Osteoarthritis (OA), Joint Pain.  There is no past medical history of  Cardiac Pacer, Diabetes Mellitus type 1, Diabetes Mellitus type 2, Chronic Obstructive Pulmonary Disease, Rheumatoid Arthritis, Gastroesophageal Reflux Disease, Cerebrovascular Accident (CVA), Cardiovascular Disease, Alcoholism, Crohn's disease, Terminal Illness.   severe dementianutritional quality good      Social History:      Smoking Status: Light tobacco smoker; Last Reviewed: 2023            Pack-years: 1         Date quit smoking: 10 years ago      Alcohol use: Non-Drinker  Racial background:   Occupation: disabled  Marital status:   Patient knows the purpose/use of medications  Patient is taking medications as prescribed               Family History:   Father  History remarkable for  gun shot.   Age 22;       Mother  History remarkable for  Coronary Artery Disease.   Age 42;       Review of Systems:   General:  Patient denies  sweats, fatigue, fever, chills.  Ears, Nose and Throat:  Patient denies  hearing loss, ringing in the ears.  Cardiovascular:  Patient denies  chest pain.  Respiratory:  Patient denies   "shortness of breath.  Gastrointestinal:  Patient denies  nausea, vomiting, diarrhea, constipation.  Genitourinary:  Patient denies  urinary frequency, prostate problems.  Endocrine:  Patient denies  thyroid problems.  Hematologic:  Patient denies  bleeding tendencies, easy bruising tendency.  Musculoskeletal:  Patient denies  joint pain, walking aids.  Neurologic  Patient denies  seizures, headache.  Psychologic:  Patient denies  anxiety, panic attacks, depression.  Skin:  Patient denies  skin rash.       DEPRESSION SCREENING:   Not at all the patient reports little interest or pleasure in doing things.  Not at all the patient reports feeling down, depressed, or hopeless.  Date Depression Screening Last Done: 02/13/2020   PHQ-2 Score 0; PHQ-9 Score incomplete   Several days the patient reports little interest or pleasure in doing things.  Several days the patient reports feeling down, depressed, or hopeless.  Date Depression Screening Last Done: 05/14/2019      Vital Signs:   Weight 256 lbs; Height 6 ft 4 in; BMI 31.2   12/06/2023 10:28 AM (CST)  Temperature 98.6 °F; Respiration Rate 18   12/06/2023 10:31 AM (CST)  Pulse Rate 70 bpm; Blood Pressure 145 / 90 mm/Hg; Pain Level: 6         Physical Examination:   Pre Anesthesia evaluation  Pre Op dx: cervical radiculopathy  Planned procedure: Cath guided C6-C7 ARTHUR with right bias  Age: 53  Ht: 6'4"  Wt: 237 lbs  BMI: 28.9  Allergies: Keflex  Meds/Labs/Test  Prior Surgeries:  Anethesia complications: none known     Medical History  CNS: _X_Neg.   __Seizures  __CVA  __TIA  __HA  __Depression  Cardiac: __Neg  __CAD  __Stents  __MI  _X_HTN  __CHF  Pulmonary: __Neg  __COPD  __Asthma  __Sleep Apnea  __Smoker PPD  __CPAP  GI:_X_Neg.  __Reflux  __Liver Dysfunction  __Hepatitis  __Hiatal Hernia  __Hepatitis  __ETOH  __GERD   Renal:  _X_Neg.  __CRI  __ESRD  Endocrine:  _X_Neg.  __Thyroid  __Diabetes  Heme:  _X_Neg.   __Blood thinners  __other     Cranial Nerves II-XII grossly " intact.  No apparent distress.  Patient is alert and oriented times three.  No somnolence or slurred speech.     Lumbar spine has pain with flexion and extension lateral rotation  Lumbar facets are tender to palpation  No focal neurologic deficits noted  physical exam essentially unchanged from previous   Back Motion:   Lumbar / lumbosacral spine abnormal.         Additional Physical Findings:  General general appearance normal appears comfortable,   Oriented to time, place and person, pleasant, seated  Not uncomfortable  Head normal head exam  Eyes normal eye exam  Chest normal chest exam  Respiratory normal respiratory exam  Musculoskeletal abnormal low back pain and knee pain,   Joint tenderness  Posture normal  Neurologic normal neurologic exam  Skin normal skin exam       Toxicology Report   Toxicology was performed.   Reason for Toxicology:  A presumptive urine drug screen was done today to rapidly obtain and integrate results into clinical assessment and decision-making for ongoing safe prescribing of controlled substances.                                       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.1) - Testicular hypofunction  (M54.16) - Lumbar radiculopathy  (M47.812) - Cervical spondylosis without myelopathy  (M47.817) - Lumbosacral spondylosis  (M54.12) - Cervical radiculopathy  (M54.2) - Neck pain  (G89.4) - Chronic pain syndrome      Plan:   Follow up visit 1 months      -refilled Percocet -- due 12/08/2024 (pharmacy closed on weekends)  -gave rx for Brendon with hold date 01/09/2024  -refilled Testosterone with 2 RF (gave in Aug)   -Sent rx for Flexeril 10 mg TID, Gabapentin 600 mg BID  and Voltaren gel to [facility] with 5 RF (due again in May)   -drink plenty of fluids and water  -increase fiber in diet  -call sooner with worsening pain  -pump refill was done on 09/20/2023  -next alarm date will be 03/15/2024  -refill Vit D 06614 unit  weekly to [facility] with 5 RF  -will did receive cardiac clearance from Dr. Arteaga    -check blood work by March 2024   -Scheduled Bilateral C3-C5 MBNB at [facility] on 12/20/2023 at 12:30 pm      Monitored Anesthesia Care medical necessity authorization request:   Monitor anesthesia request is medically indicated for the scheduled _cervical MBNB_______procedure due to:     - needle phobia and anxiety, placing the patient at risk during the provided service._YES____  - patient has a BMI greater than 45 ____  - patient has severe sleep apnea for which BiPAP and oxygen are needed while sleeping._____  - patient is unable to follow simple commands due to mental state.____  - patient has an ASA class greater than 3 and requires constant presence of an anesthesiologist/CRNA during the procedure.____  - patient has severe problems with muscles and muscle spasticity that makes it hard to lie still. ____  - patient suffers from chronic pain and is unable to function due to diminished ADL's._YES___  - patient is dependent on opioids or sedatives. _YES___   - Other __YES__        Patient has a medical problem of intermediate acuity. The medical condition is not life threatening but poses a significant impact on morbidity and/or impacts the patients activities of daily living. This procedure is medically necessary to reduce pain and improve functionality.     Indications for this procedure for this specific patient include the following:   - Pt has had symptoms for three months with moderate to severe pain with functional impairment rated of  /10 pain.   - Pain non-responsive to conservative care.  - Pain predominately axial and not associated with radiculopathy or claudication.  - No non-facet pathology as source of pain.  - Clinical assessment implicates facet joint as putative pain source.   - Pain is exacerbated by extension or prolonged sitting/standing and relieved by rest.   - No unexplained neurologic deficit.   - No  history of coagulopathy , infection or unstable medical conditions.  - Pain is causing significant functional limitation resulting in diminished quality of life and impaired age appropriate ADL's.  - Repeat injections not done prior to 7 days.  - No more than 2 levels will be done per side.     NSAIDS Failure_YES___  Pain for 3 months or >_YES____  Pain level 6> intermittent or continuous__YES__  Physical exam with documented signs that facets are the primary source of pain_YES___              NARCOTIC STATEMENT  Patient is taking the narcotic pain medications as prescribed. Refill is being given because of the benefit to the patient in regards to the pain. Patient has agreed not to abuse of medication and not to take it more than what is prescribed. The nature of the drug including the potential for addiction and dependency and abuse was also discussed with the patient. Patient has developed physical dependency for the narcotic pain medication for his pain relief.  Patient has also developed tolerance to the sedative effect of the narcotic pain medications.  Patient has decided to continue with these medications despite potential for addiction as described by this office.  This was stressed to the patient that it is the patient's responsibility to secure the narcotic medication and in any event of loss for any reason whatsoever,  there will be no refill before the next due date. Patient also understands that they are not supposed to drive or work on machinery while taking these medications.  Also explained to the patient that in the event of traffic citation, the presence of this drug in  bloodstream may result in DUI.  Patient has been advised not to drink alcohol while taking this medication.  Patient has verbalized understanding of our office policy and has signed a contract with us in this regard.          Chief Complaint:      HPI:     Assessment/Plan:               Jaziel Pierson MD  Pain  Management  [facility] - Pain Management

## 2023-12-20 NOTE — TRANSFER OF CARE
"Anesthesia Transfer of Care Note    Patient: Jesse Andrade    Procedure(s) Performed: Procedure(s) (LRB):  BLOCK, NERVE, FACET JOINT, CERVICAL, MEDIAL BRANCH (Bilateral)    Patient location: PACU    Anesthesia Type: general    Transport from OR: Transported from OR on room air with adequate spontaneous ventilation    Post pain: adequate analgesia    Post assessment: no apparent anesthetic complications    Post vital signs: stable    Level of consciousness: sedated    Nausea/Vomiting: no nausea/vomiting    Complications: none    Transfer of care protocol was followed      Last vitals: Visit Vitals  /76   Pulse 70   Temp 36.1 °C (97 °F) (Skin)   Resp 12   Ht 6' 4" (1.93 m)   Wt 112.9 kg (249 lb)   SpO2 97%   BMI 30.31 kg/m²     "

## 2023-12-20 NOTE — ANESTHESIA PREPROCEDURE EVALUATION
12/20/2023  Jesse Andrade is a 53 y.o., male.      Pre-op Assessment    I have reviewed the Patient Summary Reports.     I have reviewed the Nursing Notes. I have reviewed the NPO Status.   I have reviewed the Medications.     Review of Systems  Anesthesia Hx:  No problems with previous Anesthesia                Social:  Dip/Chew, Former Smoker       Cardiovascular:     Hypertension           hyperlipidemia    Non ischemic cardiomyopathy                         Pulmonary:        Sleep Apnea                Neurological:        Chronic Pain Syndrome                             Physical Exam  General: Well nourished, Cooperative, Alert and Oriented    Airway:  Mallampati: III   Mouth Opening: Normal  TM Distance: 4 - 6 cm  Tongue: Large  Neck ROM: Normal ROM    Dental:  Intact        Anesthesia Plan  Type of Anesthesia, risks & benefits discussed:    Anesthesia Type: Gen Natural Airway  Intra-op Monitoring Plan: Standard ASA Monitors  Post Op Pain Control Plan: multimodal analgesia  Induction:  IV  Informed Consent: Informed consent signed with the Patient and all parties understand the risks and agree with anesthesia plan.  All questions answered. Patient consented to blood products? Yes  ASA Score: 3  Day of Surgery Review of History & Physical: I have interviewed and examined the patient. I have reviewed the patient's H&P dated: There are no significant changes.     Ready For Surgery From Anesthesia Perspective.     .   Past Medical History:   Diagnosis Date    Arthritis     Hyperlipidemia     Hypertension     Sleep apnea        Past Surgical History:   Procedure Laterality Date    CERVICAL SPINE SURGERY      fusion    EPIDURAL STEROID INJECTION INTO CERVICAL SPINE Right 6/7/2023    Procedure: INJECTION, STEROID, SPINE, CERVICAL, EPIDURAL;  Surgeon: Jaziel Pierson MD;  Location: [facility];   Service: Pain Management;  Laterality: Right;  C6-7 cath guided ARTHUR with right bias    EPIDURAL STEROID INJECTION INTO CERVICAL SPINE Left 10/25/2023    Procedure: INJECTION, STEROID, SPINE, CERVICAL, EPIDURAL;  Surgeon: Jaziel Pierson MD;  Location: [facility];  Service: Pain Management;  Laterality: Left;  Left C6-7 cath guided ARTHUR    FACIAL FRACTURE SURGERY      INJECTION OF ANESTHETIC AGENT AROUND MEDIAL BRANCH NERVES INNERVATING LUMBAR FACET JOINT Bilateral 10/26/2022    Procedure: BLOCK, NERVE, FACET JOINT, LUMBAR, MEDIAL BRANCH;  Surgeon: Jaziel Pierson MD;  Location: [facility];  Service: Pain Management;  Laterality: Bilateral;  Bilateral L2-5 MBNB    RADIOFREQUENCY ABLATION OF LUMBAR MEDIAL BRANCH NERVE AT SINGLE LEVEL Right 12/14/2022    Procedure: RADIOFREQUENCY ABLATION, NERVE, SPINAL, LUMBAR, MEDIAL BRANCH, 1 LEVEL;  Surgeon: Jaziel Pierson MD;  Location: [facility];  Service: Pain Management;  Laterality: Right;  Right L3-5 RFTC    RADIOFREQUENCY ABLATION OF LUMBAR MEDIAL BRANCH NERVE AT SINGLE LEVEL Bilateral 7/26/2023    Procedure: RADIOFREQUENCY ABLATION, NERVE, SPINAL, LUMBAR, MEDIAL BRANCH, 1 LEVEL;  Surgeon: Jaziel Pierson MD;  Location: [facility];  Service: Pain Management;  Laterality: Bilateral;  Bilateral L3-5 RFTC    REFILL PAIN PUMP N/A 4/14/2021    Procedure: REFILLING, ANALGESIC PUMP;  Surgeon: Jaziel Pierson MD;  Location: [facility];  Service: Pain Management;  Laterality: N/A;  pump refill    REFILL PAIN PUMP N/A 10/27/2021    Procedure: REFILLING, ANALGESIC PUMP;  Surgeon: Jaziel Pierson MD;  Location: [facility];  Service: Pain Management;  Laterality: N/A;  Pump refill    REFILL PAIN PUMP N/A 4/13/2022    Procedure: REFILLING, ANALGESIC PUMP;  Surgeon: Jaziel Pierson MD;  Location: [facility];  Service: Pain Management;  Laterality: N/A;  Pump refill    REFILL PAIN PUMP N/A 10/5/2022     Procedure: REFILLING, ANALGESIC PUMP;  Surgeon: Jaziel Pierson MD;  Location: [facility];  Service: Pain Management;  Laterality: N/A;  Pump refill    REFILL PAIN PUMP N/A 3/29/2023    Procedure: REFILLING, ANALGESIC PUMP;  Surgeon: Jaziel Pierson MD;  Location: [facility];  Service: Pain Management;  Laterality: N/A;  Pump refill    REFILL PAIN PUMP N/A 9/20/2023    Procedure: REFILLING, ANALGESIC PUMP;  Surgeon: Jaziel Pierson MD;  Location: [facility];  Service: Pain Management;  Laterality: N/A;  pump refill    TOTAL KNEE ARTHROPLASTY Bilateral        Family History   Problem Relation Age of Onset    Coronary artery disease Mother        Social History     Socioeconomic History    Marital status:    Tobacco Use    Smoking status: Former     Current packs/day: 0.75     Average packs/day: 0.8 packs/day for 10.0 years (7.5 ttl pk-yrs)     Types: Cigarettes     Passive exposure: Past    Smokeless tobacco: Current     Types: Snuff   Substance and Sexual Activity    Alcohol use: Never       No current facility-administered medications for this encounter.       Review of patient's allergies indicates:   Allergen Reactions    Keflex [cephalexin]        Patient Active Problem List   Diagnosis    Chronic pain syndrome    Non-ischemic cardiomyopathy    Hypertension    Hyperlipidemia

## 2023-12-31 ENCOUNTER — EXTERNAL CHRONIC CARE MANAGEMENT (OUTPATIENT)
Dept: FAMILY MEDICINE | Facility: CLINIC | Age: 53
End: 2023-12-31
Payer: MEDICARE

## 2023-12-31 PROCEDURE — G0511 CCM/BHI BY RHC/FQHC 20MIN MO: HCPCS | Mod: ,,, | Performed by: FAMILY MEDICINE

## 2024-01-31 ENCOUNTER — EXTERNAL CHRONIC CARE MANAGEMENT (OUTPATIENT)
Dept: FAMILY MEDICINE | Facility: CLINIC | Age: 54
End: 2024-01-31
Payer: MEDICARE

## 2024-01-31 PROCEDURE — G0511 CCM/BHI BY RHC/FQHC 20MIN MO: HCPCS | Mod: ,,, | Performed by: FAMILY MEDICINE

## 2024-02-29 ENCOUNTER — EXTERNAL CHRONIC CARE MANAGEMENT (OUTPATIENT)
Dept: FAMILY MEDICINE | Facility: CLINIC | Age: 54
End: 2024-02-29
Payer: MEDICARE

## 2024-02-29 PROCEDURE — G0511 CCM/BHI BY RHC/FQHC 20MIN MO: HCPCS | Mod: ,,, | Performed by: FAMILY MEDICINE

## 2024-03-06 ENCOUNTER — HOSPITAL ENCOUNTER (OUTPATIENT)
Facility: HOSPITAL | Age: 54
Discharge: HOME | End: 2024-03-06
Attending: ANESTHESIOLOGY | Admitting: ANESTHESIOLOGY
Payer: MEDICARE

## 2024-03-06 VITALS
BODY MASS INDEX: 29.83 KG/M2 | WEIGHT: 245 LBS | SYSTOLIC BLOOD PRESSURE: 183 MMHG | RESPIRATION RATE: 20 BRPM | DIASTOLIC BLOOD PRESSURE: 97 MMHG | HEIGHT: 76 IN | HEART RATE: 72 BPM | TEMPERATURE: 99 F

## 2024-03-06 DIAGNOSIS — G89.4 CHRONIC PAIN SYNDROME: ICD-10-CM

## 2024-03-06 PROCEDURE — 63600175 PHARM REV CODE 636 W HCPCS

## 2024-03-06 PROCEDURE — 62370 ANL SP INF PMP W/MDREPRG&FIL: CPT | Performed by: ANESTHESIOLOGY

## 2024-03-06 NOTE — DISCHARGE SUMMARY
Patient underwent Intrathecal pump refill and reprogramming procedure 03/06/2024. The pt will follow up in clinic. Discharged home. Discharge Dx: Chronic pain syndrome

## 2024-03-06 NOTE — OP NOTE
03/06/2024  Jesse Andrade 1970  PREOPERATIVE DIAGNOSIS:     Chronic pain syndrome           POSTOPERATIVE DIAGNOSIS:  Chronic pain syndrome                                                             PROCEDURE:  Intrathecal pump refill and reprogramming        SURGEON: Dr Jaziel Pierson              The patient was identified in the exam room and the pump was interrogated using the Medtronic system.  The pump was noted to have 4.0 cc remaining of a solution of morphine 10 mg per mL.  The patient's abdomen was marked in the appropriate location and prepped and draped in usual sterile fashion.  A 40 mL Medtronic pump refill kit was utilized for this procedure.  After the patient abdomen was prepped and draped in the usual sterile fashion, the Medtronic 40 mL template was placed on the skin overlying the target area of the pump orifice.  A 22-gauge noncoring needle was then utilized to access the pump.  A 20 mL syringe was utilized to aspirate the contents of the pump. Actual removed 5.0cc.   The solution was clear with no signs of serous fluid or blood. The patient then had a 40 mL allotment of morphine 10 mg per mL injected incrementally using the syringe and filter.  The needle was removed with its tip intact and the patient tolerated the procedure well with no adverse events.  The pump was then reprogrammed using the Medtronic system. Next fill on/before 08/30/2024

## 2024-03-06 NOTE — H&P
"[facility] - Pain Management  Pain Management  H&P    Patient Name: Jesse Andrade  MRN: [id number]  Admission Date: 3/6/2024  Primary Care Provider: Otilio Vargas MD    Patient information was obtained from     Subjective:     Principal Problem:  Mary Torres FNP  [address]  [place] [address]  [phone]                   RE: Jesse Andrade      : 1970   Date of Service: 2024   Procedure Follow-Up bilateral C3-C5 MBNB pre 8 post 0 with    75% relief   Existing Patient           Chief Complaint:   Neck pain described as aching, constant, dull, sharp; Location bilaterally, at the midline, radiating to shoulder(s); aggravated by activity, poor posture; relieved by narcotics, rest-lying down; onset gradual, constant; reported as 6/10 on pain scale,  Back pain achy in nature, constant, dull; located in the lumbar region, in the midline; aggravated by activity, standing, bending, lifting, walking, weather/temperature change; relieved by analgesics, rest; gradual in occurrence; pain rated as 4/10 on the pain scale,  Knee pain Location right patella; characterized as aching aggravated by walking Timing constant Severity moderate to severe.   Patient seated in room 4 with c/o neck and right hip pain, reports pain is unchanged since last visit  Did not have procedure        [place] Prescription Monitoring Program data was reviewed for this patient for the past 12 calendar months to ascertain any current, or past use of scheduled medications.      History of Present Illness:   What part of the body? neck and right hip   Pain level at best 3; Pain level at worst 6; Pain level at present 7; Pain level on average 6   54 y/o BM with complaints of "low back pain and Right knee pain"; objective data essentially unchanged from previous visit; states that pain improved by 75% after Bilateral C3-C5 MBNB and is still doing good with pain relief; he is still doing good with numbness and " tingling and has been able to sleep better; his last cervical ARTHUR helped with numbness to LUE but MBNB helped pain to neck; he was able to have his pump refilled and will be due again in March; he is still having more left side neck pain with numbness to fingers; states that pain improved by 80% after Bilateral L4-L5 RFTC and still continues to do well with lower back pain relief; states that pain improved by 85% after cervical ARTHUR but will occasionally have some LUE tingling that lasted for several weeks but is now ready to have an additional injection to help with neck pain without radicular symptoms; his last cervical RF was June 2022; he was able to have his Lumbar RFTC done in Dec but only did the Right side because he took his ASA that morning; he actually had about 80% improvement of pain; pain is only worse with increased movements and first in the morning but improves throughout the day; he has already had his pump refill on 10/05 as well as #2 MBNB that improved by 85% that lasted for a couple of weeks; states that pain improved by 90% that lasted about 2 weeks after Bilateral L2-L5 MBNB #1; he has just recently had his Cervical RFTC in June that has improved his pain and is not having much pain at all to his cervical spine; states that pain improved by 80% after Left C3-C7 RFTC in Nov 2021; overall, pain has been about the same except for some worsening lower back pain; states that pain improved by 80% after Bilateral C3-C7 MBNB that lasted for about a week; pain does get better with movement but tingling to his fingers has improved; we will consider ARTHUR on return if tingling persists/returns but this has improved since last procedure; pain improved by 85% after Bilateral L3-L5 MBNB in 09/2021 that lasted for several months and is worse now with bending and moving; he was able to have his CT Lspine 01/2021 that revealed some changes but the last injection helped with pain; he was also able to have his  pump refilled in Oct and will be due again in April;  states that he has started walking some but causes worsening pain; states that pain improved by 75% after Bilateral C3-C7 MBNB in Dec that lasted for several months but pain is slowly returning and is ready to schedule a neck injection to help with radicular symptoms at this time; denies any radiation of pain into legs and no numbness or tingling to upper or lower extremities; exercise has helped some but pain is still there; states that neck pain has been worse the past several months that wakes him up at night; he is now having more pain that prevents sleeping and from doing every day activity; states that pain improved by 75% after his Bilateral C3-C7 RFTC that lasted for several months; states that pain improved after Bilateral C3-C7 #1 about 80% and then has had a 80% improvement in pain after his Bilateral C3-C7 FI#2 as well as had 50% improvement of ADL's; He has been going to physical therapy after having a R knee replacement in Jan 2019 by Dr. Garcia and has had more pain to knee but Percocet is helping better with that pain as well; he is still exercising some but continues to have pain and swelling; usually ice, rest and meds help with this; he sees Dr. Garcia again in July 2020 but feels like it is not healing as well as it should; he has been going to the gym to help with pain and strengthening; states he has been doing well and is better today since last visit after having his last injection; He describes the pain as dull pain that is constant; he has also started taking testosterone injections at home and still has 1 RF left at pharmacy; denies any issues with constipation; He has been experiencing some tingling and numbness to his R thigh; he started Neurontin 300mg BID since last visit and states it is helping with the numbness and tingling sensation and request to continue with 600 mg daily; also states that the Flexeril has helped better with  muscle spasms; he is due for his next pump refill      UDS: consistent x 20; inconsistent x 3 (no percocet, +Norco that he had left over from the past but instructed that this can not happen again) UDS due today   The previous urine drug screen was evaluated, and it was compliant for the medications that has been prescribed. A presumptive urine drug screen was done today to rapidly obtain and integrate results into clinical assessment and decision-making for ongoing safe prescribing of controlled substances. The results of the presumptive UDS done today was positive for opiates. He is prescribed oxycodone. Because presumptive UDS positive results are not definitive due to sensitivity and specificity and cross reactivity limitations and negative results do not necessarily indicate absence of drugs or substances in the urine specimen, confirmation will identify specific prescribed and non-prescribed medications or illicit use for ongoing safe prescribing of controlled substances including benzodiazepines, opioid agonist, opioids antagonist, partial agonist, stimulants, muscle relaxers, antidepressants, sleep aids, anti-seizure medicine, and alcohol. Urine drug analysis is used to assist with diagnosis and therapeutic decision-making concerning pretreatment assessment. Intensity and frequency of monitoring with urine drug testing will be based on the risk stratification method in determining risk level for opioid addiction.     Meds: Percocet --due 02/08/2024; requests refills on meds and states that meds help with his pain; no misuse of meds and no opioid abuse;  reviewed and is appropriate; he is currently prescribed 30 mg of morphine equivalent meds/day      Nursing:   Pain Medication/Dose/Last Taken/# Taken  ms pump  oxycodone  pain level with medication is  4/10 and without is a 8/10     Is it helping? Yes  Physical Therapy  no Home Exercises  Is it helping? Yes     no New medical problems or surgeries  no New  medications  no New allergies  no New antibiotics, fever, infection      Allergies:   Allergies Reviewed - 24 10:12:15 AM CST  Keflex       Current Medications:   Medications List Reviewed (24 10:10:34 AM CST)  Ketorolac Tromethamine Injection Solution 60 MG/2ML (2023) From 2023 11:15 AM to 2023 11:30 AM  dexAMETHasone Sodium Phosphate Injection Solution 4 MG/ML (2023) From 2023 11:15 AM to 2023 11:30 AM  Diclofenac Sodium External Gel 1 % (2023) Apply 2 gram applications three times a day for 30 day(s)  Cyclobenzaprine HCl Oral Tablet 10 MG (2023) Take 1 tablet twice a day as needed for 30 day(s)  Gabapentin Oral Tablet 600 MG (2023) Take 1 tablet twice a day for 30 day(s)  Vitamin D3 Oral Capsule 1.25 MG (80968 UT) (2023) Take 1 capsule once a week for 4 week(s)  Percocet Oral Tablet  MG (2024) Take 1 tablet twice a day as needed for 30 day(s)  Testosterone Cypionate Intramuscular Solution 200 MG/ML (2023) Inject 1/2 milliliter IM every 72 hours  Morphine Sulfate Intramuscular Device 10 MG/0.7ML (2017) Morphine Pain Pump  Aspirin Oral Tablet Delayed Release 325 MG (2017) Take 1 tablet twice a week for 30 week(s)  Atorvastatin Calcium Oral Tablet 10 MG (2017) Take 1 tablet once a day for 30 day(s)      Previous Studies:  CT SCAN  Final Report  CT CTIC SPINE LUMBAR W/O CONTRAST    Show Printer-Friendly Version with Images (4 of 5)  Show Printer-Friendly Version without images Patient Name: Jesse Andrade   : Mar-   ID: [id number]([facility])   Study Date:  08:03             Studies- CTIC SPINE LUMBAR W/O CONTRAST Indications- Lumbar radiculopathy. Loose leads on pain pump Comparison- None. Technique- CT of the lumbar spine was performed without the administration of intravenous contrast. Axial, sagittal, and coronal series were submitted for interpretation. Findings- Alignment of the lumbar vertebral  "bodies is normal. Intervertebral disc spaces as well as vertebral body heights are well maintained throughout the lumbar spine. Facet joints have normal anatomic relationships and minimal degenerative change. No acute fractures are demonstrated. The imaged intra-abdominal and intrapelvic contents demonstrate no evidence of acute pathology. Visualized portion of the "lead" demonstrates no evidence of discontinuity. Multiple enlarged lymph nodes are demonstrated within the ileocolic mesentery. These measure 7 mm in short axis dimension. Additional enlarged lymph nodes in the paracaval region measuring up to 6-7 mm. Borderline bilateral intrapelvic lymph nodes measuring up to 8 mm short axis dimension are demonstrated. Conclusion- 1. Multiple intrapelvic and mesenteric lymph nodes are demonstrated which are borderline in size to minimally enlarged. When compared to additional imaging of the abdomen and pelvis performed February 4, 2014, these lymph nodes appear to have minimally increased in size as well as number. Significance of these lymph nodes and relevant to patient's back pain is uncertain. Correlation recommended. 2. No significant abnormality of the lumbar spine is demonstrated. 3. No break in continuity of the demonstrated lead is present. 9/21/2016 8-30 AM Ordering physician-SALINAS SINGH MD Point of service- [facility]. This report has been electronically signed by Ryley Altman - MARYBEL Kaplan Radiologist- RYLEY ALTMAN II, M.D. Releasing Radiologist- RYLEY ALTMAN II, M.D. Released Date Time- 09/21/16 0845 ------------------------------------------------------------------------------     Signed by: Serena Weiner    Signed on: Sept- 08:30    CT [facility] 01/18/2021  Impression:  1. no acute fracture or subluxation within the lumber spine  2. Mild degenerative disc disease and facet/unconvertebral arthropathy at L5/S1. Moderate symmetric bilateral " neuroforaminal stenoses at L5/S1  3. Very mild to mild midline broad-based posterior intervertebral disc bulges from L3/L4-L5/S1 without high grade spinal canal stenosis    CT Cspine at [facility]  2023  Impression:  multilevel degenerative changes throughout the cervical spine; previous C5-C7 ACDF         ; X-RAY  Final Report  CR XR RIBS LEFT WITH PA/AP CHEST    Show Printer-Friendly Version Patient Name: Jesse Andrade   : Mar-   ID: [id number]([facility])   Study Date:  13:59             AP chest with left rib detail. Indication- Fall, with left chest wall pain. The heart size is normal. There is elevation of the right hemidiaphragm, stable finding. Surgical clips in the right upper quadrant. Postsurgical changes in the cervical spine. No pneumothorax. No pleural effusion. No rib fracture is seen. Impression- No acute abnormality. Place of service- [facility] This report has been electronically signed by Mariah Saucedo - MARYBEL Kaplan Physician- MARIAH SAUCEDO M.D. Releasing Physician- MARIAH SAUCEDO M.D. Released Date Time- 18 1419 ------------------------------------------------------------------------------     Signed by: Serena Weiner    Signed on:  14:13  Final Report  CR CR SPINE CERVICAL AP AND LATERAL  Show Printer-Friendly Version Patient Name: Jesse Andrade    : Mar-    ID: [id number]([facility])    Study Date: Mar- 12:39       CR SPINE CERVICAL AP AND LATERAL    Indication- Cervicalgia    Comparison- Cervical spine x-ray Shirley 15, 2005    Technique- Frontal and lateral views of the cervical spine.    Findings-     There is straightening of normal cervical lordosis which may be  positional or secondary muscle spasm. There is no significant  anterolisthesis or retrolisthesis.  Anterior cervical fusion hardware at  C5-C7 with osseous fusion. Mild marginal osteophyte formation noted at  C3-4 and C4-5. The C7-T1 level is not well  visualized on lateral view.  No gross evidence of significant vertebral body height loss.    IMPRESSION-    Degenerative change and malalignment of the cervical spine as detailed  above.Anterior cervical fusion hardware at C5-C7 with osseous fusion.       Point of Service- [facility]    This report has been electronically signed by Santhosh Lemus          -  MARYBEL Kaplan Physician- SANTHOSH LEMUS D.O.       Releasing Physician- SANTHOSH LEMUS D.O.       Released Date Time- 19 1409   ------------------------------------------------------------------------------    Signed by: Serena Weiner Signed on: Mar- 13:58  ; Findings  LAB results from Dr. Daley:  2018  Vitamin D  10.8  Aug 2018  PSA  0.989  Testosterone 255  Total Testosterone 226  Free Testosterone 4.75     C spine X-ray 2019      Past Medical History:   The patient has a past medical history of  Hypertension, High Cholesterol, Osteoarthritis (OA), Joint Pain.  There is no past medical history of  Cardiac Pacer, Diabetes Mellitus type 1, Diabetes Mellitus type 2, Chronic Obstructive Pulmonary Disease, Rheumatoid Arthritis, Gastroesophageal Reflux Disease, Cerebrovascular Accident (CVA), Cardiovascular Disease, Alcoholism, Crohn's disease, Terminal Illness.   severe dementianutritional quality good      Social History:      Smoking Status: Light tobacco smoker; Last Reviewed: 2024            Pack-years: 1         Date quit smoking: 10 years ago      Alcohol use: Non-Drinker  Racial background:   Occupation: disabled  Marital status:   Patient knows the purpose/use of medications  Patient is taking medications as prescribed               Family History:   Father  History remarkable for  gun shot.   Age 22;       Mother  History remarkable for  Coronary Artery Disease.   Age 42;       Review of Systems:   General:  Patient denies  sweats, fatigue, fever,  "chills.  Ears, Nose and Throat:  Patient denies  hearing loss, ringing in the ears.  Cardiovascular:  Patient denies  chest pain.  Respiratory:  Patient denies  shortness of breath.  Gastrointestinal:  Patient denies  nausea, vomiting, diarrhea, constipation.  Genitourinary:  Patient denies  urinary frequency, prostate problems.  Endocrine:  Patient denies  thyroid problems.  Hematologic:  Patient denies  bleeding tendencies, easy bruising tendency.  Musculoskeletal:  Patient denies  joint pain, walking aids.  Neurologic  Patient denies  seizures, headache.  Psychologic:  Patient denies  anxiety, panic attacks, depression.  Skin:  Patient denies  skin rash.       DEPRESSION SCREENING:   Not at all the patient reports little interest or pleasure in doing things.  Not at all the patient reports feeling down, depressed, or hopeless.  Date Depression Screening Last Done: 02/13/2020   PHQ-2 Score 0; PHQ-9 Score incomplete   Several days the patient reports little interest or pleasure in doing things.  Several days the patient reports feeling down, depressed, or hopeless.  Date Depression Screening Last Done: 05/14/2019      Vital Signs:   Weight 256 lbs; Height 6 ft 4 in; BMI 31.2   01/25/2024 9:59 AM (CST)  Temperature 98.6 °F; Respiration Rate 18   01/25/2024 10:01 AM (CST)  Pulse Rate 62 bpm; Blood Pressure 137 / 90 mm/Hg; Pain Level: 5         Physical Examination:   Pre Anesthesia evaluation  Pre Op dx: cervical radiculopathy  Planned procedure: Cath guided C6-C7 ARTHUR with right bias  Age: 53  Ht: 6'4"  Wt: 237 lbs  BMI: 28.9  Allergies: Keflex  Meds/Labs/Test  Prior Surgeries:  Anethesia complications: none known     Medical History  CNS: _X_Neg.   __Seizures  __CVA  __TIA  __HA  __Depression  Cardiac: __Neg  __CAD  __Stents  __MI  _X_HTN  __CHF  Pulmonary: __Neg  __COPD  __Asthma  __Sleep Apnea  __Smoker PPD  __CPAP  GI:_X_Neg.  __Reflux  __Liver Dysfunction  __Hepatitis  __Hiatal Hernia  __Hepatitis  __ETOH  " __GERD   Renal:  _X_Neg.  __CRI  __ESRD  Endocrine:  _X_Neg.  __Thyroid  __Diabetes  Heme:  _X_Neg.   __Blood thinners  __other     Cranial Nerves II-XII grossly intact.  No apparent distress.  Patient is alert and oriented times three.  No somnolence or slurred speech.     Lumbar spine has pain with flexion and extension lateral rotation  Lumbar facets are tender to palpation  No focal neurologic deficits noted  physical exam essentially unchanged from previous   Back Motion:   Lumbar / lumbosacral spine abnormal.         Additional Physical Findings:  General general appearance normal appears comfortable,   Oriented to time, place and person, pleasant, seated  Not uncomfortable  Head normal head exam  Eyes normal eye exam  Chest normal chest exam  Respiratory normal respiratory exam  Musculoskeletal abnormal low back pain and knee pain,   Joint tenderness  Posture normal  Neurologic normal neurologic exam  Skin normal skin exam       Toxicology Report   Toxicology was performed.   Reason for Toxicology:  A presumptive urine drug screen was done today to rapidly obtain and integrate results into clinical assessment and decision-making for ongoing safe prescribing of controlled substances.   Test Date/Time: 01/25/2024 00:00   Tested By: EM   Oxycodone  (OXY): Result = Positive; Control = Positive   Morphine  (OPI): Result = Positive; Control = Positive   Amphetamines  (AMP): Result = Negative; Control = Positive   Oxazepam  (BZO): Result = Negative; Control = Positive   Methadone  (MTD): Result = Negative; Control = Positive   Secobarbital  (BAR): Result = Negative; Control = Positive   Tricyclic Antidepressants  (TCA): Result = Negative; Control = Positive   Nortriptyline  (TCA): Result = Negative; Control = Positive   Marijuana-Carboxy Tetrahydrocannabinoid   (THC): Result = Negative; Control = Positive   Cocaine  (MEDARDO): Result = Negative; Control = Positive   Ecstasy-Methylenedioxymethamphetamine  (MDMA): Result  = Negative; Control = Positive   D Methamphetamine  (MET): Result = Negative; Control = Positive   Phencyclidine  (PCP): Result = Negative; Control = Positive   Adulterants  (OX, SG, pH): Result = Negative; Control = Positive      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.1) - Testicular hypofunction  (M54.16) - Lumbar radiculopathy  (M47.812) - Cervical spondylosis without myelopathy  (M47.817) - Lumbosacral spondylosis  (M54.12) - Cervical radiculopathy  (M54.2) - Neck pain  (G89.4) - Chronic pain syndrome      Plan:   Follow up visit 1 months      -refilled Percocet -- due 02/08/2024  -gave rx for March with hold date 03/08/2024 (pharmacy closed on Sat)   -refilled Testosterone with 2 RF (does not need refills)   -Sent rx for Flexeril 10 mg TID, Gabapentin 600 mg BID  and Voltaren gel to [facility] with 5 RF (due again in May)   -drink plenty of fluids and water  -increase fiber in diet  -call sooner with worsening pain  -pump refill was done on 09/20/2023  -next alarm date will be 03/15/2024  -refill Vit D 92291 unit weekly to [facility] with 5 RF  -will did receive cardiac clearance from Dr. Arteaga    -check blood work by April 2024   -Scheduled Pump refill at [facility] on 03/06/2024 at 8;00 am      Monitored Anesthesia Care medical necessity authorization request:   Monitor anesthesia request is medically indicated for the scheduled _cervical MBNB_______procedure due to:     - needle phobia and anxiety, placing the patient at risk during the provided service._YES____  - patient has a BMI greater than 45 ____  - patient has severe sleep apnea for which BiPAP and oxygen are needed while sleeping._____  - patient is unable to follow simple commands due to mental state.____  - patient has an ASA class greater than 3 and requires constant presence of an anesthesiologist/CRNA during the procedure.____  - patient has severe problems with muscles and muscle  spasticity that makes it hard to lie still. ____  - patient suffers from chronic pain and is unable to function due to diminished ADL's._YES___  - patient is dependent on opioids or sedatives. _YES___   - Other __YES__        Patient has a medical problem of intermediate acuity. The medical condition is not life threatening but poses a significant impact on morbidity and/or impacts the patients activities of daily living. This procedure is medically necessary to reduce pain and improve functionality.     Indications for this procedure for this specific patient include the following:   - Pt has had symptoms for three months with moderate to severe pain with functional impairment rated of  /10 pain.   - Pain non-responsive to conservative care.  - Pain predominately axial and not associated with radiculopathy or claudication.  - No non-facet pathology as source of pain.  - Clinical assessment implicates facet joint as putative pain source.   - Pain is exacerbated by extension or prolonged sitting/standing and relieved by rest.   - No unexplained neurologic deficit.   - No history of coagulopathy , infection or unstable medical conditions.  - Pain is causing significant functional limitation resulting in diminished quality of life and impaired age appropriate ADL's.  - Repeat injections not done prior to 7 days.  - No more than 2 levels will be done per side.     NSAIDS Failure_YES___  Pain for 3 months or >_YES____  Pain level 6> intermittent or continuous__YES__  Physical exam with documented signs that facets are the primary source of pain_YES___              NARCOTIC STATEMENT  Patient is taking the narcotic pain medications as prescribed. Refill is being given because of the benefit to the patient in regards to the pain. Patient has agreed not to abuse of medication and not to take it more than what is prescribed. The nature of the drug including the potential for addiction and dependency and abuse was also  discussed with the patient. Patient has developed physical dependency for the narcotic pain medication for his pain relief.  Patient has also developed tolerance to the sedative effect of the narcotic pain medications.  Patient has decided to continue with these medications despite potential for addiction as described by this office.  This was stressed to the patient that it is the patient's responsibility to secure the narcotic medication and in any event of loss for any reason whatsoever,  there will be no refill before the next due date. Patient also understands that they are not supposed to drive or work on machinery while taking these medications.  Also explained to the patient that in the event of traffic citation, the presence of this drug in  bloodstream may result in DUI.  Patient has been advised not to drink alcohol while taking this medication.  Patient has verbalized understanding of our office policy and has signed a contract with us in this regard.                  Prescriptions Written Today:  Percocet Oral Tablet  MG  Take 1 tablet twice a day as needed for 30 day(s)  Refills: No Refills  Rx quantity: 60  Take 1 tablet twice a day as needed for 30 day(s)  Refills: No Refills  Rx quantity: 60                 Mary Torres       Electronically signed: 1/25/2024 11:58:31 AM      Jaziel Pierson MD      Electronically signed: 1/29/2024 12:02:53 PM       Chief Complaint:      HPI:       Assessment/Plan:         Jaziel Pierson MD  Pain Management  [facility] - Pain Management

## 2024-03-31 ENCOUNTER — EXTERNAL CHRONIC CARE MANAGEMENT (OUTPATIENT)
Dept: FAMILY MEDICINE | Facility: CLINIC | Age: 54
End: 2024-03-31
Payer: MEDICARE

## 2024-03-31 PROCEDURE — G0511 CCM/BHI BY RHC/FQHC 20MIN MO: HCPCS | Mod: ,,, | Performed by: FAMILY MEDICINE

## 2024-04-17 ENCOUNTER — HOSPITAL ENCOUNTER (OUTPATIENT)
Facility: HOSPITAL | Age: 54
Discharge: HOME | End: 2024-04-17
Attending: ANESTHESIOLOGY | Admitting: ANESTHESIOLOGY
Payer: MEDICARE

## 2024-04-17 ENCOUNTER — ANESTHESIA (OUTPATIENT)
Dept: PAIN MEDICINE | Facility: HOSPITAL | Age: 54
End: 2024-04-17
Payer: MEDICARE

## 2024-04-17 ENCOUNTER — ANESTHESIA EVENT (OUTPATIENT)
Dept: PAIN MEDICINE | Facility: HOSPITAL | Age: 54
End: 2024-04-17
Payer: MEDICARE

## 2024-04-17 ENCOUNTER — OFFICE VISIT (OUTPATIENT)
Dept: CARDIOLOGY | Facility: CLINIC | Age: 54
End: 2024-04-17
Payer: MEDICARE

## 2024-04-17 VITALS
DIASTOLIC BLOOD PRESSURE: 103 MMHG | TEMPERATURE: 98 F | HEART RATE: 69 BPM | WEIGHT: 238.81 LBS | OXYGEN SATURATION: 99 % | HEIGHT: 76 IN | RESPIRATION RATE: 10 BRPM | SYSTOLIC BLOOD PRESSURE: 178 MMHG | BODY MASS INDEX: 29.08 KG/M2

## 2024-04-17 VITALS
SYSTOLIC BLOOD PRESSURE: 140 MMHG | WEIGHT: 238 LBS | RESPIRATION RATE: 18 BRPM | HEIGHT: 74 IN | HEART RATE: 68 BPM | DIASTOLIC BLOOD PRESSURE: 100 MMHG | BODY MASS INDEX: 30.54 KG/M2

## 2024-04-17 DIAGNOSIS — I10 PRIMARY HYPERTENSION: Primary | ICD-10-CM

## 2024-04-17 DIAGNOSIS — M47.812 CERVICAL SPONDYLOSIS WITHOUT MYELOPATHY: ICD-10-CM

## 2024-04-17 DIAGNOSIS — I10 PRIMARY HYPERTENSION: ICD-10-CM

## 2024-04-17 DIAGNOSIS — I42.8 NON-ISCHEMIC CARDIOMYOPATHY: Primary | ICD-10-CM

## 2024-04-17 PROCEDURE — 63600175 PHARM REV CODE 636 W HCPCS

## 2024-04-17 PROCEDURE — 99214 OFFICE O/P EST MOD 30 MIN: CPT | Mod: PBBFAC,25 | Performed by: STUDENT IN AN ORGANIZED HEALTH CARE EDUCATION/TRAINING PROGRAM

## 2024-04-17 PROCEDURE — D9220A PRA ANESTHESIA: Mod: ,,, | Performed by: NURSE ANESTHETIST, CERTIFIED REGISTERED

## 2024-04-17 PROCEDURE — 1159F MED LIST DOCD IN RCRD: CPT | Mod: CPTII,,, | Performed by: STUDENT IN AN ORGANIZED HEALTH CARE EDUCATION/TRAINING PROGRAM

## 2024-04-17 PROCEDURE — 93010 ELECTROCARDIOGRAM REPORT: CPT | Mod: S$PBB,,, | Performed by: STUDENT IN AN ORGANIZED HEALTH CARE EDUCATION/TRAINING PROGRAM

## 2024-04-17 PROCEDURE — 37000008 HC ANESTHESIA 1ST 15 MINUTES: Performed by: ANESTHESIOLOGY

## 2024-04-17 PROCEDURE — 37000009 HC ANESTHESIA EA ADD 15 MINS: Performed by: ANESTHESIOLOGY

## 2024-04-17 PROCEDURE — 25000003 PHARM REV CODE 250

## 2024-04-17 PROCEDURE — 99213 OFFICE O/P EST LOW 20 MIN: CPT | Mod: S$PBB,,, | Performed by: STUDENT IN AN ORGANIZED HEALTH CARE EDUCATION/TRAINING PROGRAM

## 2024-04-17 PROCEDURE — 64490 INJ PARAVERT F JNT C/T 1 LEV: CPT | Mod: 50 | Performed by: ANESTHESIOLOGY

## 2024-04-17 PROCEDURE — 3080F DIAST BP >= 90 MM HG: CPT | Mod: CPTII,,, | Performed by: STUDENT IN AN ORGANIZED HEALTH CARE EDUCATION/TRAINING PROGRAM

## 2024-04-17 PROCEDURE — 3008F BODY MASS INDEX DOCD: CPT | Mod: CPTII,,, | Performed by: STUDENT IN AN ORGANIZED HEALTH CARE EDUCATION/TRAINING PROGRAM

## 2024-04-17 PROCEDURE — 63600175 PHARM REV CODE 636 W HCPCS: Mod: JG | Performed by: ANESTHESIOLOGY

## 2024-04-17 PROCEDURE — 4010F ACE/ARB THERAPY RXD/TAKEN: CPT | Mod: CPTII,,, | Performed by: STUDENT IN AN ORGANIZED HEALTH CARE EDUCATION/TRAINING PROGRAM

## 2024-04-17 PROCEDURE — 3077F SYST BP >= 140 MM HG: CPT | Mod: CPTII,,, | Performed by: STUDENT IN AN ORGANIZED HEALTH CARE EDUCATION/TRAINING PROGRAM

## 2024-04-17 PROCEDURE — 93005 ELECTROCARDIOGRAM TRACING: CPT | Mod: PBBFAC | Performed by: STUDENT IN AN ORGANIZED HEALTH CARE EDUCATION/TRAINING PROGRAM

## 2024-04-17 PROCEDURE — 64491 INJ PARAVERT F JNT C/T 2 LEV: CPT | Mod: 50 | Performed by: ANESTHESIOLOGY

## 2024-04-17 RX ORDER — BUPIVACAINE HYDROCHLORIDE 2.5 MG/ML
INJECTION, SOLUTION INFILTRATION; PERINEURAL CODE/TRAUMA/SEDATION MEDICATION
Status: DISCONTINUED | OUTPATIENT
Start: 2024-04-17 | End: 2024-04-17 | Stop reason: HOSPADM

## 2024-04-17 RX ORDER — LIDOCAINE HYDROCHLORIDE 20 MG/ML
INJECTION, SOLUTION EPIDURAL; INFILTRATION; INTRACAUDAL; PERINEURAL
Status: DISCONTINUED | OUTPATIENT
Start: 2024-04-17 | End: 2024-04-17

## 2024-04-17 RX ORDER — PROPOFOL 10 MG/ML
VIAL (ML) INTRAVENOUS
Status: DISCONTINUED | OUTPATIENT
Start: 2024-04-17 | End: 2024-04-17

## 2024-04-17 RX ORDER — SODIUM CHLORIDE 9 MG/ML
500 INJECTION, SOLUTION INTRAVENOUS CONTINUOUS
Status: DISCONTINUED | OUTPATIENT
Start: 2024-04-17 | End: 2024-04-17 | Stop reason: HOSPADM

## 2024-04-17 RX ORDER — AMLODIPINE BESYLATE 5 MG/1
5 TABLET ORAL DAILY
Qty: 90 TABLET | Refills: 3 | Status: SHIPPED | OUTPATIENT
Start: 2024-04-17

## 2024-04-17 RX ADMIN — PROPOFOL 100 MG: 10 INJECTION, EMULSION INTRAVENOUS at 01:04

## 2024-04-17 RX ADMIN — PROPOFOL 100 MG: 10 INJECTION, EMULSION INTRAVENOUS at 02:04

## 2024-04-17 RX ADMIN — LIDOCAINE HYDROCHLORIDE 80 MG: 20 INJECTION, SOLUTION INTRAVENOUS at 01:04

## 2024-04-17 NOTE — OP NOTE
04/17/2024  Jesse Andrade 1970    PRE-OPERATIVE DIAGNOSIS:   Cervical Spondylosis without Myelopathy                                                              Neck Pain     POST-OPERATIVE DIAGNOSIS:   Cervical Spondylosis without Myelopathy                                                              Neck Pain     PROCEDURE:  Bilateral Cervical Medial Branch Nerve Block C5-7     COMPLICATIONS:  None  DRAINS AND PACKS:  None  BLOOD LOSS:  None  ANESTHESIA:   MAC     The patient was identified in the holding area. The risks and benefits of the procedure were again explained to the patient and he agreed to proceed.   Patient was taken in stable condition to the operating room and was placed prone on the C-arm table.  All pressure points were checked and padded comfortably while the patient was awake.  The patient's neck was prepped and draped in usual sterile fashion.  Standard ASA monitors were applied and monitored throughout the procedure.  Time out was completed.  Anesthesia was initiated.  The C-arm was brought into the true AP position to identify the waist of the vertebral bodies from C5-C7 on the left.  The C-arm was then obliqued in serial fashion and used to identify the eye of the Scottie dog formation.  A skin weal using Bupivacaine 0.25% (2.5mg/ml) 1 ml was raised and a 22-gauge 3 ½ inch spinal needle was advanced down into the articular surface at each level in serial fashion and was identified. The stylets were removed.   A 1.5 cc of Bupivacaine 0.25% (2.5mg/ml) was administered at each level. The patient tolerated the procedure well with no adverse events.  The needle was removed with its tip intact. The procedure was repeated on the right as described above.  There was adequate hemostasis at the conclusion of procedure. The patient was taken in stable condition to the holding area where he was monitored for the appropriate time of convalescence and discharged to the care of the patients  .        preoperative pain score was  9/10.    postoperative pain score was a  /10.

## 2024-04-17 NOTE — DISCHARGE SUMMARY
Patient underwent  Bilateral Cervical Medial Branch Nerve Block C5-7   procedure 04/17/2024. The pt will follow up in clinic. Discharged home. Discharge Dx: Cervical Spondylosis without Myelopathy

## 2024-04-17 NOTE — PROGRESS NOTES
PCP: Otilio Vargas MD    Referring Provider:     Subjective:   Jesse Andrade is a 54 y.o. male with hx of HTN, HLD, NICM (LVEF recovered to 40-45%) who presents for followup.     24 - Doing well. Denies any cardiac symptoms. Did not take  BP meds today    22 -  Patient statse he has been doing well over the past year.   Patient denies chest pain or shortness of breath.   Blood pressure elevated.  States he is taking medications.     21 - Patient states he is doing well.  Denies chest pain, SOB or orthopnea.  Denies lower extremity edema.  Blood pressure controlled.      EKG  21:  Normal sinus rhythm.  Voltage criteria for left ventricular hypertrophy   ECHO 21:  The ]left ventricle is normal in size with mild concentric hypertrophy and mildly decreased systolic function.  EF 45%.                              There is left ventricular global hypokinesis.                              Normal RV size and function.             20 - mild LV dilation. LVEF 40-45%. Nl RV   C  - non-obst CAD. LV gram 35-40%    Fhx: Mother -  off heart attack  Shx: Denies smoking, etoh or drug      Lab Results   Component Value Date     2023    K 5.0 2023     2023    CO2 33 (H) 2023    BUN 11 2023    CREATININE 1.03 2023    CALCIUM 9.6 2023    ANIONGAP 9 2023    ESTGFRAFRICA 95 10/12/2020    EGFRNONAA 79 10/12/2020       Lab Results   Component Value Date    CHOL 202 (H) 2023    CHOL 200 10/15/2020     Lab Results   Component Value Date    HDL 41 2023    HDL 39 10/15/2020     Lab Results   Component Value Date    LDLCALC 135 2023    LDLCALC 142 10/15/2020     Lab Results   Component Value Date    TRIG 128 2023    TRIG 97 10/15/2020     Lab Results   Component Value Date    CHOLHDL 4.9 2023    CHOLHDL 5.1 10/15/2020       Lab Results   Component Value Date    WBC 6.34 2023    HGB 14.1 2023     "HCT 46.1 01/11/2023    MCV 85.7 01/11/2023     01/11/2023           Review of Systems   Respiratory:  Negative for cough and shortness of breath.    Cardiovascular:  Negative for chest pain, palpitations, orthopnea, claudication, leg swelling and PND.         Objective:   BP (!) 140/100   Pulse 68   Resp 18   Ht 6' 2" (1.88 m)   Wt 108 kg (238 lb)   BMI 30.56 kg/m²     Physical Exam  Vitals and nursing note reviewed.   Cardiovascular:      Rate and Rhythm: Normal rate and regular rhythm.      Pulses: Normal pulses.      Heart sounds: Normal heart sounds.   Pulmonary:      Breath sounds: Normal breath sounds.   Neurological:      Mental Status: He is oriented to person, place, and time.           Assessment:     1. Non-ischemic cardiomyopathy                  Plan:   Non-ischemic cardiomyopathy  non-ischemic. [facility] in 2018 with mild non-obstructive CAD.   - EF recovered from 35-40% to 40/45% on recent echo  - GDMT: coreg 25mg bid, increase lisinorpril to 40mg;   - Device; not indicated  - Repeat ECHO with EF of 45%. Continue managment         Hypertension  On coreg, lisinopirl and amlodipine 5mg qd     Hyperlipidemia  On lipitor 80mg qd        "

## 2024-04-17 NOTE — H&P
"[facility] - Pain Management  Pain Management  H&P    Patient Name: Jesse Andrade  MRN: [id number]  Admission Date: 2024  Primary Care Provider: Otilio Vargas MD    Patient information was obtained from     Subjective:     Principal Problem:  [facility]  [address]  [place] [address]  [phone]                   RE: Jesse Andrade      : 1970   Date of Service: 2024   Existing Patient           Chief Complaint:   Neck pain described as aching, constant, dull, sharp; Location bilaterally, at the midline, radiating to shoulder(s); aggravated by activity, poor posture; relieved by narcotics, rest-lying down; onset gradual, constant; reported as 6/10 on pain scale,  Back pain achy in nature, constant, dull; located in the lumbar region, in the midline; aggravated by activity, standing, bending, lifting, walking, weather/temperature change; relieved by analgesics, rest; gradual in occurrence; pain rated as 4/10 on the pain scale,  Knee pain Location right patella; characterized as aching aggravated by walking Timing constant Severity moderate to severe.   Patient seated in room 1 with c/o neck and right hip pain, reports pain is unchanged since last visit  Did not have procedure        [place] Prescription Monitoring Program data was reviewed for this patient for the past 12 calendar months to ascertain any current, or past use of scheduled medications.      History of Present Illness:   What part of the body? neck and right hip   Pain level at best 3; Pain level at worst 6; Pain level at present 7; Pain level on average 6   55 y/o BM with complaints of "low back pain and Right knee pain"; objective data essentially unchanged from previous visit; he was able to have his Pump refill in March but is now having more all over from his "head to his toes" but has worsening pain to neck and lower back is essentially ok; he has driven to [place] for a death in the " family; he is having more neck pain and pain to both shoulders; states that pain improved by 75% after Bilateral C3-C5 MBNB #1 and is still doing good with pain relief; he is still doing good with numbness and tingling and has been able to sleep better; his last cervical ARTHUR helped with numbness to LUE but MBNB helped pain to neck; he was able to have his pump refilled and will be due again in March; he is still having more left side neck pain with numbness to fingers; states that pain improved by 80% after Bilateral L4-L5 RFTC and still continues to do well with lower back pain relief; states that pain improved by 85% after cervical ARTHUR but will occasionally have some LUE tingling that lasted for several weeks but is now ready to have an additional injection to help with neck pain without radicular symptoms; his last cervical RF was June 2022; he was able to have his Lumbar RFTC done in Dec but only did the Right side because he took his ASA that morning; he actually had about 80% improvement of pain; pain is only worse with increased movements and first in the morning but improves throughout the day; he has already had his pump refill on 10/05 as well as #2 MBNB that improved by 85% that lasted for a couple of weeks; states that pain improved by 90% that lasted about 2 weeks after Bilateral L2-L5 MBNB #1; he has just recently had his Cervical RFTC in June that has improved his pain and is not having much pain at all to his cervical spine; states that pain improved by 80% after Left C3-C7 RFTC in Nov 2021; overall, pain has been about the same except for some worsening lower back pain; states that pain improved by 80% after Bilateral C3-C7 MBNB that lasted for about a week; pain does get better with movement but tingling to his fingers has improved; we will consider ARTHUR on return if tingling persists/returns but this has improved since last procedure; pain improved by 85% after Bilateral L3-L5 MBNB in 09/2021 that  lasted for several months and is worse now with bending and moving; he was able to have his CT Lspine 01/2021 that revealed some changes but the last injection helped with pain; he was also able to have his pump refilled in Oct and will be due again in April;  states that he has started walking some but causes worsening pain; states that pain improved by 75% after Bilateral C3-C7 MBNB in Dec that lasted for several months but pain is slowly returning and is ready to schedule a neck injection to help with radicular symptoms at this time; denies any radiation of pain into legs and no numbness or tingling to upper or lower extremities; exercise has helped some but pain is still there; states that neck pain has been worse the past several months that wakes him up at night; he is now having more pain that prevents sleeping and from doing every day activity; states that pain improved by 75% after his Bilateral C3-C7 RFTC that lasted for several months; states that pain improved after Bilateral C3-C7 #1 about 80% and then has had a 80% improvement in pain after his Bilateral C3-C7 FI#2 as well as had 50% improvement of ADL's; He has been going to physical therapy after having a R knee replacement in Jan 2019 by Dr. Garcia and has had more pain to knee but Percocet is helping better with that pain as well; he is still exercising some but continues to have pain and swelling; usually ice, rest and meds help with this; he sees Dr. Garcia again in July 2020 but feels like it is not healing as well as it should; he has been going to the gym to help with pain and strengthening; states he has been doing well and is better today since last visit after having his last injection; He describes the pain as dull pain that is constant; he has also started taking testosterone injections at home and still has 1 RF left at pharmacy; denies any issues with constipation; He has been experiencing some tingling and numbness to his R thigh; he  started Neurontin 300mg BID since last visit and states it is helping with the numbness and tingling sensation and request to continue with 600 mg daily; also states that the Flexeril has helped better with muscle spasms; he is due for his next pump refill      UDS: consistent x 20; inconsistent x 3 (no percocet, +Norco that he had left over from the past but instructed that this can not happen again) UDS due today   The previous urine drug screen was evaluated, and it was compliant for the medications that has been prescribed. A presumptive urine drug screen was done today to rapidly obtain and integrate results into clinical assessment and decision-making for ongoing safe prescribing of controlled substances. The results of the presumptive UDS done today was positive for opiates. He is prescribed oxycodone. Because presumptive UDS positive results are not definitive due to sensitivity and specificity and cross reactivity limitations and negative results do not necessarily indicate absence of drugs or substances in the urine specimen, confirmation will identify specific prescribed and non-prescribed medications or illicit use for ongoing safe prescribing of controlled substances including benzodiazepines, opioid agonist, opioids antagonist, partial agonist, stimulants, muscle relaxers, antidepressants, sleep aids, anti-seizure medicine, and alcohol. Urine drug analysis is used to assist with diagnosis and therapeutic decision-making concerning pretreatment assessment. Intensity and frequency of monitoring with urine drug testing will be based on the risk stratification method in determining risk level for opioid addiction.     Meds: Percocet --due 04/08/2024; requests refills on meds and states that meds help with his pain; no misuse of meds and no opioid abuse;  reviewed and is appropriate; he is currently prescribed 30 mg of morphine equivalent meds/day      Nursing:   Pain Medication/Dose/Last Taken/#  Taken  ms pump  oxycodone  pain level with medication is  4/10 and without is a 8/10     Is it helping? Yes  Physical Therapy  no Home Exercises  Is it helping? Yes     no New medical problems or surgeries  no New medications  no New allergies  no New antibiotics, fever, infection      Allergies:   Allergies Reviewed - 24 10:16:24 AM CST  Keflex       Current Medications:   Medications List Reviewed (24 10:16:20 AM CST)  Ketorolac Tromethamine Injection Solution 60 MG/2ML (2023) From 2023 11:15 AM to 2023 11:30 AM  dexAMETHasone Sodium Phosphate Injection Solution 4 MG/ML (2023) From 2023 11:15 AM to 2023 11:30 AM  Diclofenac Sodium External Gel 1 % (2023) Apply 2 gram applications three times a day for 30 day(s)  Cyclobenzaprine HCl Oral Tablet 10 MG (2023) Take 1 tablet twice a day as needed for 30 day(s)  Gabapentin Oral Tablet 600 MG (2023) Take 1 tablet twice a day for 30 day(s)  Vitamin D3 Oral Capsule 1.25 MG (98557 UT) (2023) Take 1 capsule once a week for 4 week(s)  Percocet Oral Tablet  MG (2024) Take 1 tablet twice a day as needed for 30 day(s)  Testosterone Cypionate Intramuscular Solution 200 MG/ML (2023) Inject 1/2 milliliter IM every 72 hours  Morphine Sulfate Intramuscular Device 10 MG/0.7ML (2017) Morphine Pain Pump  Aspirin Oral Tablet Delayed Release 325 MG (2017) Take 1 tablet twice a week for 30 week(s)  Atorvastatin Calcium Oral Tablet 10 MG (2017) Take 1 tablet once a day for 30 day(s)      Previous Studies:  CT SCAN  Final Report  CT CTIC SPINE LUMBAR W/O CONTRAST    Show Printer-Friendly Version with Images (4 of 5)  Show Printer-Friendly Version without images Patient Name: Jesse Andrade   : Mar-   ID: [id number]([facility])   Study Date:  08:03             Studies- CTIC SPINE LUMBAR W/O CONTRAST Indications- Lumbar radiculopathy. Loose leads on pain pump Comparison- None.  "Technique- CT of the lumbar spine was performed without the administration of intravenous contrast. Axial, sagittal, and coronal series were submitted for interpretation. Findings- Alignment of the lumbar vertebral bodies is normal. Intervertebral disc spaces as well as vertebral body heights are well maintained throughout the lumbar spine. Facet joints have normal anatomic relationships and minimal degenerative change. No acute fractures are demonstrated. The imaged intra-abdominal and intrapelvic contents demonstrate no evidence of acute pathology. Visualized portion of the "lead" demonstrates no evidence of discontinuity. Multiple enlarged lymph nodes are demonstrated within the ileocolic mesentery. These measure 7 mm in short axis dimension. Additional enlarged lymph nodes in the paracaval region measuring up to 6-7 mm. Borderline bilateral intrapelvic lymph nodes measuring up to 8 mm short axis dimension are demonstrated. Conclusion- 1. Multiple intrapelvic and mesenteric lymph nodes are demonstrated which are borderline in size to minimally enlarged. When compared to additional imaging of the abdomen and pelvis performed February 4, 2014, these lymph nodes appear to have minimally increased in size as well as number. Significance of these lymph nodes and relevant to patient's back pain is uncertain. Correlation recommended. 2. No significant abnormality of the lumbar spine is demonstrated. 3. No break in continuity of the demonstrated lead is present. 9/21/2016 8-30 AM Ordering physician-SALINAS SINGH MD Point of service- [facility]. This report has been electronically signed by Ryley Altman Kelly Kaplan Radiologist- RYLEY ALTMAN II, M.D. Releasing Radiologist- RYLEY ALTMAN II, M.D. Released Date Time- 09/21/16 0845 ------------------------------------------------------------------------------     Signed by: Serena Weiner    Signed on: Sept- 08:30    CT " [facility] 2021  Impression:  1. no acute fracture or subluxation within the lumber spine  2. Mild degenerative disc disease and facet/unconvertebral arthropathy at L5/S1. Moderate symmetric bilateral neuroforaminal stenoses at L5/S1  3. Very mild to mild midline broad-based posterior intervertebral disc bulges from L3/L4-L5/S1 without high grade spinal canal stenosis    CT Cspine at [facility]  2023  Impression:  multilevel degenerative changes throughout the cervical spine; previous C5-C7 ACDF         ; X-RAY  Final Report  CR XR RIBS LEFT WITH PA/AP CHEST    Show Printer-Friendly Version Patient Name: Jesse Andrade   : Mar-   ID: [id number]([facility])   Study Date:  13:59             AP chest with left rib detail. Indication- Fall, with left chest wall pain. The heart size is normal. There is elevation of the right hemidiaphragm, stable finding. Surgical clips in the right upper quadrant. Postsurgical changes in the cervical spine. No pneumothorax. No pleural effusion. No rib fracture is seen. Impression- No acute abnormality. Place of service- [facility] This report has been electronically signed by Mariah Saucedo - MARYBEL Kaplan PhysicianKelly SAUCEDO M.D. Releasing Physician- MARIAH SAUCEDO M.D. Released Date Time- 18 1419 ------------------------------------------------------------------------------     Signed by: Serena Weiner    Signed on:  14:13  Final Report  CR CR SPINE CERVICAL AP AND LATERAL  Show Printer-Friendly Version Patient Name: Jesse Andrade    : Mar-    ID: [id number]([facility])    Study Date: Mar- 12:39       CR SPINE CERVICAL AP AND LATERAL    Indication- Cervicalgia    Comparison- Cervical spine x-ray Shirley 15, 2005    Technique- Frontal and lateral views of the cervical spine.    Findings-     There is straightening of normal cervical lordosis which may be  positional or secondary muscle spasm. There is no  significant  anterolisthesis or retrolisthesis.  Anterior cervical fusion hardware at  C5-C7 with osseous fusion. Mild marginal osteophyte formation noted at  C3-4 and C4-5. The C7-T1 level is not well visualized on lateral view.  No gross evidence of significant vertebral body height loss.    IMPRESSION-    Degenerative change and malalignment of the cervical spine as detailed  above.Anterior cervical fusion hardware at C5-C7 with osseous fusion.       Point of Service- [facility]    This report has been electronically signed by Santhosh Lemus          -  MARYBEL Kaplan Physician- SANTHOSH LEMUS D.O.       Releasing Physician- SANTHOSH LEMUS D.O.       Released Date Time- 03/26/19 1409   ------------------------------------------------------------------------------    Signed by: Serena Weiner Signed on: Mar- 13:58  ; Findings  LAB results from Dr. Daley:  July 2018  Vitamin D  10.8  Aug 2018  PSA  0.989  Testosterone 255  Total Testosterone 226  Free Testosterone 4.75     C spine X-ray 03/26/2019      Past Medical History:   The patient has a past medical history of  Hypertension, High Cholesterol, Osteoarthritis (OA), Joint Pain.  There is no past medical history of  Cardiac Pacer, Diabetes Mellitus type 1, Diabetes Mellitus type 2, Chronic Obstructive Pulmonary Disease, Rheumatoid Arthritis, Gastroesophageal Reflux Disease, Cerebrovascular Accident (CVA), Cardiovascular Disease, Alcoholism, Crohn's disease, Terminal Illness.   severe dementianutritional quality good      Social History:      Smoking Status: Light tobacco smoker; Last Reviewed: 01/25/2024            Pack-years: 1         Date quit smoking: 10 years ago      Alcohol use: Non-Drinker  Racial background:   Occupation: disabled  Marital status:   Patient knows the purpose/use of medications  Patient is taking medications as prescribed               Family History:   Father  History  "remarkable for  gun shot.   Age 22;       Mother  History remarkable for  Coronary Artery Disease.   Age 42;       Review of Systems:   General:  Patient denies  sweats, fatigue, fever, chills.  Ears, Nose and Throat:  Patient denies  hearing loss, ringing in the ears.  Cardiovascular:  Patient denies  chest pain.  Respiratory:  Patient denies  shortness of breath.  Gastrointestinal:  Patient denies  nausea, vomiting, diarrhea, constipation.  Genitourinary:  Patient denies  urinary frequency, prostate problems.  Endocrine:  Patient denies  thyroid problems.  Hematologic:  Patient denies  bleeding tendencies, easy bruising tendency.  Musculoskeletal:  Patient denies  joint pain, walking aids.  Neurologic  Patient denies  seizures, headache.  Psychologic:  Patient denies  anxiety, panic attacks, depression.  Skin:  Patient denies  skin rash.       DEPRESSION SCREENING:   Not at all the patient reports little interest or pleasure in doing things.  Not at all the patient reports feeling down, depressed, or hopeless.  Date Depression Screening Last Done: 2020   PHQ-2 Score 0; PHQ-9 Score incomplete   Several days the patient reports little interest or pleasure in doing things.  Several days the patient reports feeling down, depressed, or hopeless.  Date Depression Screening Last Done: 2019      Vital Signs:   Weight 256 lbs; Height 6 ft 4 in; BMI 31.2   2024 10:14 AM (CST)  Pulse Rate 62 bpm; Blood Pressure 137 / 90 mm/Hg; Pain Level: 5   2024 10:14 AM (CST)  Temperature 98.6 °F; Respiration Rate 18         Physical Examination:   Pre Anesthesia evaluation  Pre Op dx: cervical radiculopathy  Planned procedure: Cath guided C6-C7 ARTHUR with right bias  Age: 53  Ht: 6'4"  Wt: 237 lbs  BMI: 28.9  Allergies: Keflex  Meds/Labs/Test  Prior Surgeries:  Anethesia complications: none known     Medical History  CNS: _X_Neg.   __Seizures  __CVA  __TIA  __HA  __Depression  Cardiac: __Neg  __CAD  " __Stents  __MI  _X_HTN  __CHF  Pulmonary: __Neg  __COPD  __Asthma  __Sleep Apnea  __Smoker PPD  __CPAP  GI:_X_Neg.  __Reflux  __Liver Dysfunction  __Hepatitis  __Hiatal Hernia  __Hepatitis  __ETOH  __GERD   Renal:  _X_Neg.  __CRI  __ESRD  Endocrine:  _X_Neg.  __Thyroid  __Diabetes  Heme:  _X_Neg.   __Blood thinners  __other     Cranial Nerves II-XII grossly intact.  No apparent distress.  Patient is alert and oriented times three.  No somnolence or slurred speech.     Lumbar spine has pain with flexion and extension lateral rotation  Lumbar facets are tender to palpation  No focal neurologic deficits noted  physical exam essentially unchanged from previous   Back Motion:   Lumbar / lumbosacral spine abnormal.         Additional Physical Findings:  General general appearance normal appears comfortable,   Oriented to time, place and person, pleasant, seated  Not uncomfortable  Head normal head exam  Eyes normal eye exam  Chest normal chest exam  Respiratory normal respiratory exam  Musculoskeletal abnormal low back pain and knee pain,   Joint tenderness  Posture normal  Neurologic normal neurologic exam  Skin normal skin exam       Toxicology Report   Toxicology was performed.   Reason for Toxicology:  A presumptive urine drug screen was done today to rapidly obtain and integrate results into clinical assessment and decision-making for ongoing safe prescribing of controlled substances.   Test Date/Time: 04/02/2024 00:00   Tested By: NATALIE   Oxycodone  (OXY): Result = Negative; Control = Positive   Morphine  (OPI): Result = Positive; Control = Positive   Amphetamines  (AMP): Result = Negative; Control = Positive   Oxazepam  (BZO): Result = Negative; Control = Positive   Methadone  (MTD): Result = Negative; Control = Positive   Secobarbital  (BAR): Result = Negative; Control = Positive   Tricyclic Antidepressants  (TCA): Result = Negative; Control = Positive   Nortriptyline  (TCA): Result = Negative; Control = Positive    Marijuana-Carboxy Tetrahydrocannabinoid   (THC): Result = Negative; Control = Positive   Cocaine  (MEDARDO): Result = Negative; Control = Positive   Ecstasy-Methylenedioxymethamphetamine  (MDMA): Result = Negative; Control = Positive   D Methamphetamine  (MET): Result = Negative; Control = Positive   Phencyclidine  (PCP): Result = Negative; Control = Positive   Adulterants  (OX, SG, pH): Result = Negative; Control = Positive      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.1) - Testicular hypofunction  (M54.16) - Lumbar radiculopathy  (M47.812) - Cervical spondylosis without myelopathy  (M47.817) - Lumbosacral spondylosis  (M54.12) - Cervical radiculopathy  (M54.2) - Neck pain  (G89.4) - Chronic pain syndrome      Plan:   Follow up visit 1 months      -refilled Percocet -- due 04/08/2024  -scheduled Bilateral C5-C7 MBNB #2 at [facility] on 04/17/2024 at 12:30 pm  -refilled Testosterone with 2 RF (does not need refills)   -Sent rx for Flexeril 10 mg TID, Gabapentin 600 mg BID  and Voltaren gel to [facility] with 5 RF (due again in May)   -drink plenty of fluids and water  -increase fiber in diet  -call sooner with worsening pain  -pump refill was done on 03/06/2024  -next alarm date will be 08/30/2024  -refill Vit D 59106 unit weekly to [facility] with 5 RF  -will did receive cardiac clearance from Dr. Arteaga    -check blood work today     Monitored Anesthesia Care medical necessity authorization request:   Monitor anesthesia request is medically indicated for the scheduled _cervical MBNB_______procedure due to:     - needle phobia and anxiety, placing the patient at risk during the provided service._YES____  - patient has a BMI greater than 45 ____  - patient has severe sleep apnea for which BiPAP and oxygen are needed while sleeping._____  - patient is unable to follow simple commands due to mental state.____  - patient has an ASA class greater than 3 and requires  constant presence of an anesthesiologist/CRNA during the procedure.____  - patient has severe problems with muscles and muscle spasticity that makes it hard to lie still. ____  - patient suffers from chronic pain and is unable to function due to diminished ADL's._YES___  - patient is dependent on opioids or sedatives. _YES___   - Other __YES__        Patient has a medical problem of intermediate acuity. The medical condition is not life threatening but poses a significant impact on morbidity and/or impacts the patients activities of daily living. This procedure is medically necessary to reduce pain and improve functionality.     Indications for this procedure for this specific patient include the following:   - Pt has had symptoms for three months with moderate to severe pain with functional impairment rated of  /10 pain.   - Pain non-responsive to conservative care.  - Pain predominately axial and not associated with radiculopathy or claudication.  - No non-facet pathology as source of pain.  - Clinical assessment implicates facet joint as putative pain source.   - Pain is exacerbated by extension or prolonged sitting/standing and relieved by rest.   - No unexplained neurologic deficit.   - No history of coagulopathy , infection or unstable medical conditions.  - Pain is causing significant functional limitation resulting in diminished quality of life and impaired age appropriate ADL's.  - Repeat injections not done prior to 7 days.  - No more than 2 levels will be done per side.     NSAIDS Failure_YES___  Pain for 3 months or >_YES____  Pain level 6> intermittent or continuous__YES__  Physical exam with documented signs that facets are the primary source of pain_YES___              NARCOTIC STATEMENT  Patient is taking the narcotic pain medications as prescribed. Refill is being given because of the benefit to the patient in regards to the pain. Patient has agreed not to abuse of medication and not to take it more  than what is prescribed. The nature of the drug including the potential for addiction and dependency and abuse was also discussed with the patient. Patient has developed physical dependency for the narcotic pain medication for his pain relief.  Patient has also developed tolerance to the sedative effect of the narcotic pain medications.  Patient has decided to continue with these medications despite potential for addiction as described by this office.  This was stressed to the patient that it is the patient's responsibility to secure the narcotic medication and in any event of loss for any reason whatsoever,  there will be no refill before the next due date. Patient also understands that they are not supposed to drive or work on machinery while taking these medications.  Also explained to the patient that in the event of traffic citation, the presence of this drug in  bloodstream may result in DUI.  Patient has been advised not to drink alcohol while taking this medication.  Patient has verbalized understanding of our office policy and has signed a contract with us in this regard.                  Orders Written Today:  Testosterone Free/Testosterone.total in Serum or Plasma by Detection limit = 1.0 ng/dL  Prostate specific Ag [Units/volume] in Serum or Plasma  Vitamin D+Metabolites [Mass/volume] in Serum or Plasma  Comprehensive metabolic 2000 panel in Serum or Plasma   Prescriptions Written Today:  Percocet Oral Tablet  MG  Take 1 tablet twice a day as needed for 30 day(s)  Refills: No Refills  Rx quantity: 60                 Mary Torres       Electronically signed: 4/2/2024 1:06:52 PM      Jaziel Pierson MD      Electronically signed: 4/2/2024 1:56:59 PM       Chief Complaint:      HPI:       Assessment/Plan:         Jaziel Pierson MD  Pain Management  [facility] - Pain Management

## 2024-04-17 NOTE — ANESTHESIA PREPROCEDURE EVALUATION
04/17/2024  Jesse Andrade is a 54 y.o., male.      Pre-op Assessment    I have reviewed the Patient Summary Reports.     I have reviewed the Nursing Notes. I have reviewed the NPO Status.   I have reviewed the Medications.     Review of Systems  Anesthesia Hx:  No problems with previous Anesthesia                Social:  Former Smoker       Cardiovascular:     Hypertension           hyperlipidemia    Non-ischemic cardiomyopathy                         Pulmonary:        Sleep Apnea                Neurological:        Chronic Pain Syndrome                         Endocrine:        Obesity / BMI > 30      Physical Exam  General: Well nourished, Cooperative, Alert and Oriented    Airway:  Mallampati: II   Mouth Opening: Normal  TM Distance: Normal  Tongue: Normal  Neck ROM: Normal ROM    Dental:  Intact    Chest/Lungs:  Clear to auscultation, Normal Respiratory Rate    Heart:  Rate: Normal  Rhythm: Regular Rhythm  Sounds: Normal    Abdomen:  Normal, Soft, Nontender        Anesthesia Plan  Type of Anesthesia, risks & benefits discussed:    Anesthesia Type: Gen Natural Airway, MAC  Intra-op Monitoring Plan: Standard ASA Monitors  Post Op Pain Control Plan: multimodal analgesia and IV/PO Opioids PRN  Induction:  IV  Informed Consent: Informed consent signed with the Patient and all parties understand the risks and agree with anesthesia plan.  All questions answered.   ASA Score: 3  Day of Surgery Review of History & Physical: I have interviewed and examined the patient. I have reviewed the patient's H&P dated:     Ready For Surgery From Anesthesia Perspective.     .

## 2024-04-17 NOTE — TRANSFER OF CARE
"Anesthesia Transfer of Care Note    Patient: Jesse Andrade    Procedure(s) Performed: Procedure(s) (LRB):  BLOCK, NERVE, FACET JOINT, CERVICAL, MEDIAL BRANCH (Bilateral)    Patient location: Other: pain treatment    Anesthesia Type: general and MAC    Transport from OR: Transported from OR on room air with adequate spontaneous ventilation    Post pain: adequate analgesia    Post assessment: no apparent anesthetic complications    Post vital signs: stable    Level of consciousness: awake, alert and oriented    Nausea/Vomiting: no nausea/vomiting    Complications: none    Transfer of care protocol was followedComments: Refer to nursing note for pain surya score upon discharge from recovery      Last vitals: Visit Vitals  BP (!) 105/50 (BP Location: Right arm, Patient Position: Lying)   Pulse 78   Temp 36.6 °C (97.9 °F) (Oral)   Resp 15   Ht 6' 4" (1.93 m)   Wt 108.3 kg (238 lb 12.8 oz)   SpO2 98%   BMI 29.07 kg/m²     "

## 2024-04-17 NOTE — ASSESSMENT & PLAN NOTE
non-ischemic. [facility] in 2018 with mild non-obstructive CAD.   - EF recovered from 35-40% to 40/45% on recent echo  - GDMT: coreg 25mg bid, increase lisinorpril to 40mg;   - Device; not indicated  - Repeat ECHO with EF of 45%. Continue managment

## 2024-04-17 NOTE — PLAN OF CARE
Plan:  D/c pt via wheelchair at 1440  Informed pt if does not void in 8 hours to go to ER. Notify if redness, drainage, from injection site or fever over next 3-4 days. Rest and drink plenty of fluids for the remainder of the day. No lifting over 5 lbs. For the remainder of the day. Continue regular medications as prescribed. May take pain medications as prescribed.     Pain improved 60%

## 2024-04-17 NOTE — ANESTHESIA POSTPROCEDURE EVALUATION
Anesthesia Post Evaluation    Patient: Jesse Andrade    Procedure(s) Performed: Procedure(s) (LRB):  BLOCK, NERVE, FACET JOINT, CERVICAL, MEDIAL BRANCH (Bilateral)    Final Anesthesia Type: MAC      Patient location during evaluation: GI PACU ([facility])  Patient participation: Yes- Able to Participate  Level of consciousness: awake and alert  Post-procedure vital signs: reviewed and stable  Pain management: adequate  Airway patency: patent    PONV status at discharge: No PONV  Anesthetic complications: no      Cardiovascular status: blood pressure returned to baseline, hemodynamically stable and stable  Respiratory status: unassisted  Hydration status: euvolemic  Follow-up not needed.  Comments: Refer to nursing note for pain/alexi score upon discharge from recovery.               Vitals Value Taken Time   /103 04/17/24 1440   Temp 36.6 °C (97.9 °F) 04/17/24 1412   Pulse 69 04/17/24 1440   Resp 10 04/17/24 1440   SpO2 99 % 04/17/24 1440         Event Time   Out of Recovery 14:40:00         Pain/Alexi Score: Alexi Score: 9 (4/17/2024  2:12 PM)

## 2024-04-30 ENCOUNTER — EXTERNAL CHRONIC CARE MANAGEMENT (OUTPATIENT)
Dept: FAMILY MEDICINE | Facility: CLINIC | Age: 54
End: 2024-04-30
Payer: MEDICARE

## 2024-04-30 PROCEDURE — G0511 CCM/BHI BY RHC/FQHC 20MIN MO: HCPCS | Mod: ,,, | Performed by: FAMILY MEDICINE

## 2024-05-13 ENCOUNTER — HOSPITAL ENCOUNTER (EMERGENCY)
Facility: HOSPITAL | Age: 54
Discharge: HOME | End: 2024-05-13
Payer: MEDICARE

## 2024-05-13 VITALS
HEART RATE: 93 BPM | SYSTOLIC BLOOD PRESSURE: 92 MMHG | BODY MASS INDEX: 30.67 KG/M2 | WEIGHT: 239 LBS | DIASTOLIC BLOOD PRESSURE: 61 MMHG | RESPIRATION RATE: 17 BRPM | OXYGEN SATURATION: 95 % | TEMPERATURE: 99 F | HEIGHT: 74 IN

## 2024-05-13 DIAGNOSIS — R05.9 COUGH: ICD-10-CM

## 2024-05-13 DIAGNOSIS — E86.0 DEHYDRATION: ICD-10-CM

## 2024-05-13 DIAGNOSIS — J10.1 INFLUENZA A: Primary | ICD-10-CM

## 2024-05-13 LAB
ANION GAP SERPL CALCULATED.3IONS-SCNC: 12 MMOL/L (ref 7–16)
BASOPHILS # BLD AUTO: 0.01 K/UL (ref 0–0.2)
BASOPHILS NFR BLD AUTO: 0.1 % (ref 0–1)
BUN SERPL-MCNC: 18 MG/DL (ref 7–18)
BUN/CREAT SERPL: 10 (ref 6–20)
CALCIUM SERPL-MCNC: 9.3 MG/DL (ref 8.5–10.1)
CHLORIDE SERPL-SCNC: 99 MMOL/L (ref 98–107)
CO2 SERPL-SCNC: 31 MMOL/L (ref 21–32)
CREAT SERPL-MCNC: 1.72 MG/DL (ref 0.7–1.3)
DIFFERENTIAL METHOD BLD: ABNORMAL
EGFR (NO RACE VARIABLE) (RUSH/TITUS): 47 ML/MIN/1.73M2
EOSINOPHIL # BLD AUTO: 0 K/UL (ref 0–0.5)
EOSINOPHIL NFR BLD AUTO: 0 % (ref 1–4)
ERYTHROCYTE [DISTWIDTH] IN BLOOD BY AUTOMATED COUNT: 13.5 % (ref 11.5–14.5)
GLUCOSE SERPL-MCNC: 108 MG/DL (ref 74–106)
HCT VFR BLD AUTO: 45.9 % (ref 40–54)
HGB BLD-MCNC: 14.6 G/DL (ref 13.5–18)
IMM GRANULOCYTES # BLD AUTO: 0.02 K/UL (ref 0–0.04)
IMM GRANULOCYTES NFR BLD: 0.2 % (ref 0–0.4)
INFLUENZA A MOLECULAR (OHS): POSITIVE
INFLUENZA B MOLECULAR (OHS): NEGATIVE
LYMPHOCYTES # BLD AUTO: 1.53 K/UL (ref 1–4.8)
LYMPHOCYTES NFR BLD AUTO: 18.8 % (ref 27–41)
MCH RBC QN AUTO: 28.7 PG (ref 27–31)
MCHC RBC AUTO-ENTMCNC: 31.8 G/DL (ref 32–36)
MCV RBC AUTO: 90.4 FL (ref 80–96)
MONOCYTES # BLD AUTO: 0.48 K/UL (ref 0–0.8)
MONOCYTES NFR BLD AUTO: 5.9 % (ref 2–6)
MPC BLD CALC-MCNC: 11.1 FL (ref 9.4–12.4)
NEUTROPHILS # BLD AUTO: 6.08 K/UL (ref 1.8–7.7)
NEUTROPHILS NFR BLD AUTO: 75 % (ref 53–65)
NRBC # BLD AUTO: 0 X10E3/UL
NRBC, AUTO (.00): 0 %
PLATELET # BLD AUTO: 223 K/UL (ref 150–400)
POTASSIUM SERPL-SCNC: 4.3 MMOL/L (ref 3.5–5.1)
RBC # BLD AUTO: 5.08 M/UL (ref 4.6–6.2)
SARS-COV-2 RDRP RESP QL NAA+PROBE: NEGATIVE
SARS-COV-2 RDRP RESP QL NAA+PROBE: NORMAL
SODIUM SERPL-SCNC: 138 MMOL/L (ref 136–145)
WBC # BLD AUTO: 8.12 K/UL (ref 4.5–11)

## 2024-05-13 PROCEDURE — 86617 LYME DISEASE ANTIBODY: CPT | Mod: 90 | Performed by: NURSE PRACTITIONER

## 2024-05-13 PROCEDURE — 96375 TX/PRO/DX INJ NEW DRUG ADDON: CPT

## 2024-05-13 PROCEDURE — 63600175 PHARM REV CODE 636 W HCPCS: Performed by: NURSE PRACTITIONER

## 2024-05-13 PROCEDURE — 36415 COLL VENOUS BLD VENIPUNCTURE: CPT | Performed by: NURSE PRACTITIONER

## 2024-05-13 PROCEDURE — U0002 COVID-19 LAB TEST NON-CDC: HCPCS | Performed by: NURSE PRACTITIONER

## 2024-05-13 PROCEDURE — 96374 THER/PROPH/DIAG INJ IV PUSH: CPT

## 2024-05-13 PROCEDURE — 87502 INFLUENZA DNA AMP PROBE: CPT | Performed by: NURSE PRACTITIONER

## 2024-05-13 PROCEDURE — 25000242 PHARM REV CODE 250 ALT 637 W/ HCPCS: Performed by: NURSE PRACTITIONER

## 2024-05-13 PROCEDURE — 85025 COMPLETE CBC W/AUTO DIFF WBC: CPT | Performed by: NURSE PRACTITIONER

## 2024-05-13 PROCEDURE — 80048 BASIC METABOLIC PNL TOTAL CA: CPT | Performed by: NURSE PRACTITIONER

## 2024-05-13 PROCEDURE — 86757 RICKETTSIA ANTIBODY: CPT | Mod: 90 | Performed by: NURSE PRACTITIONER

## 2024-05-13 PROCEDURE — 99284 EMERGENCY DEPT VISIT MOD MDM: CPT | Mod: ,,, | Performed by: NURSE PRACTITIONER

## 2024-05-13 PROCEDURE — 99285 EMERGENCY DEPT VISIT HI MDM: CPT | Mod: 25

## 2024-05-13 PROCEDURE — 96361 HYDRATE IV INFUSION ADD-ON: CPT

## 2024-05-13 PROCEDURE — 25000003 PHARM REV CODE 250: Performed by: NURSE PRACTITIONER

## 2024-05-13 RX ORDER — ONDANSETRON 4 MG/1
4 TABLET, ORALLY DISINTEGRATING ORAL EVERY 8 HOURS PRN
Qty: 15 TABLET | Refills: 0 | Status: SHIPPED | OUTPATIENT
Start: 2024-05-13

## 2024-05-13 RX ORDER — ALBUTEROL SULFATE 0.83 MG/ML
2.5 SOLUTION RESPIRATORY (INHALATION)
Status: COMPLETED | OUTPATIENT
Start: 2024-05-13 | End: 2024-05-13

## 2024-05-13 RX ORDER — KETOROLAC TROMETHAMINE 15 MG/ML
15 INJECTION, SOLUTION INTRAMUSCULAR; INTRAVENOUS
Status: COMPLETED | OUTPATIENT
Start: 2024-05-13 | End: 2024-05-13

## 2024-05-13 RX ORDER — ACETAMINOPHEN 500 MG
1000 TABLET ORAL
Status: COMPLETED | OUTPATIENT
Start: 2024-05-13 | End: 2024-05-13

## 2024-05-13 RX ORDER — ONDANSETRON HYDROCHLORIDE 2 MG/ML
4 INJECTION, SOLUTION INTRAVENOUS
Status: COMPLETED | OUTPATIENT
Start: 2024-05-13 | End: 2024-05-13

## 2024-05-13 RX ADMIN — ALBUTEROL SULFATE 2.5 MG: 2.5 SOLUTION RESPIRATORY (INHALATION) at 02:05

## 2024-05-13 RX ADMIN — SODIUM CHLORIDE 1000 ML: 9 INJECTION, SOLUTION INTRAVENOUS at 03:05

## 2024-05-13 RX ADMIN — ONDANSETRON 4 MG: 2 INJECTION INTRAMUSCULAR; INTRAVENOUS at 03:05

## 2024-05-13 RX ADMIN — ACETAMINOPHEN 1000 MG: 500 TABLET ORAL at 03:05

## 2024-05-13 RX ADMIN — KETOROLAC TROMETHAMINE 15 MG: 15 INJECTION, SOLUTION INTRAMUSCULAR; INTRAVENOUS at 05:05

## 2024-05-13 NOTE — ED NOTES
Lab called regarding COVID results. Spoke with Brandie and she stated that they are being told to run the COVID swab twice and if the machine reads it as invalid then to result it out. ED received no call regarding invalid result. Brandie stated we would have to reorder the COVID test.

## 2024-05-13 NOTE — DISCHARGE INSTRUCTIONS
Use prescriptions as directed. Alternate Tylenol and Ibuprofen as needed for pain and/or fever. Ensure you are drinking plenty of fluids, especially water. Get plenty of rest. Follow up with your primary care provider if no improvement or otherwise as needed. Return to the ED for worsening signs and symptoms or otherwise as needed.

## 2024-05-13 NOTE — ED PROVIDER NOTES
Encounter Date: 5/13/2024       History     Chief Complaint   Patient presents with    Cough    Chills    Headache    Nasal Congestion     54-year-old male presents to the emergency department to be evaluated for body aches, cough, headache.  His symptoms began 4 days ago after he saw a tick on him.  He is a former smoker and has an inhaler, but he reports that he really never has to use it.    The history is provided by the patient.   Cough  This is a new problem. The current episode started several days ago. Associated symptoms include headaches and myalgias. Pertinent negatives include no chest pain, no chills, no sweats, no weight loss, no ear congestion, no ear pain, no rhinorrhea, no sore throat, no shortness of breath, no wheezing and no eye redness.   Headache   This is a new problem. The current episode started in the past 7 days. The pain is located in the Bilateral region. The pain quality is similar to prior headaches. The quality of the pain is described as dull. Associated symptoms include coughing and muscle aches. Pertinent negatives include no abdominal pain, abnormal behavior, anorexia, back pain, blurred vision, dizziness, drainage, ear pain, eye pain, eye redness, eye watering, facial sweating, fever, hearing loss, insomnia, loss of balance, nausea, neck pain, numbness, phonophobia, photophobia, rhinorrhea, scalp tenderness, seizures, sinus pressure, sore throat, swollen glands, tingling, tinnitus, visual change, vomiting, weakness or weight loss.     Review of patient's allergies indicates:   Allergen Reactions    Keflex [cephalexin]      Past Medical History:   Diagnosis Date    Arthritis     Hyperlipidemia     Hypertension     Sleep apnea      Past Surgical History:   Procedure Laterality Date    CERVICAL SPINE SURGERY      fusion    EPIDURAL STEROID INJECTION INTO CERVICAL SPINE Right 6/7/2023    Procedure: INJECTION, STEROID, SPINE, CERVICAL, EPIDURAL;  Surgeon: Jaziel Pierson MD;   Location: [facility];  Service: Pain Management;  Laterality: Right;  C6-7 cath guided ARHTUR with right bias    EPIDURAL STEROID INJECTION INTO CERVICAL SPINE Left 10/25/2023    Procedure: INJECTION, STEROID, SPINE, CERVICAL, EPIDURAL;  Surgeon: Jaziel Pierson MD;  Location: [facility];  Service: Pain Management;  Laterality: Left;  Left C6-7 cath guided ARTHUR    FACIAL FRACTURE SURGERY      INJECTION OF ANESTHETIC AGENT AROUND MEDIAL BRANCH NERVES INNERVATING CERVICAL FACET JOINT Bilateral 12/20/2023    Procedure: BLOCK, NERVE, FACET JOINT, CERVICAL, MEDIAL BRANCH;  Surgeon: Jaziel Pierson MD;  Location: [facility];  Service: Pain Management;  Laterality: Bilateral;    INJECTION OF ANESTHETIC AGENT AROUND MEDIAL BRANCH NERVES INNERVATING CERVICAL FACET JOINT Bilateral 4/17/2024    Procedure: BLOCK, NERVE, FACET JOINT, CERVICAL, MEDIAL BRANCH;  Surgeon: Jaziel Pierson MD;  Location: [facility];  Service: Pain Management;  Laterality: Bilateral;  Torey C5-7 MBNB    INJECTION OF ANESTHETIC AGENT AROUND MEDIAL BRANCH NERVES INNERVATING LUMBAR FACET JOINT Bilateral 10/26/2022    Procedure: BLOCK, NERVE, FACET JOINT, LUMBAR, MEDIAL BRANCH;  Surgeon: Jaziel Pierson MD;  Location: [facility];  Service: Pain Management;  Laterality: Bilateral;  Bilateral L2-5 MBNB    RADIOFREQUENCY ABLATION OF LUMBAR MEDIAL BRANCH NERVE AT SINGLE LEVEL Right 12/14/2022    Procedure: RADIOFREQUENCY ABLATION, NERVE, SPINAL, LUMBAR, MEDIAL BRANCH, 1 LEVEL;  Surgeon: Jaziel Pierson MD;  Location: [facility];  Service: Pain Management;  Laterality: Right;  Right L3-5 RFTC    RADIOFREQUENCY ABLATION OF LUMBAR MEDIAL BRANCH NERVE AT SINGLE LEVEL Bilateral 7/26/2023    Procedure: RADIOFREQUENCY ABLATION, NERVE, SPINAL, LUMBAR, MEDIAL BRANCH, 1 LEVEL;  Surgeon: Jaziel Pierson MD;  Location: [facility];  Service: Pain Management;  Laterality: Bilateral;   Bilateral L3-5 RFTC    REFILL PAIN PUMP N/A 4/14/2021    Procedure: REFILLING, ANALGESIC PUMP;  Surgeon: Jaziel Pierson MD;  Location: [facility];  Service: Pain Management;  Laterality: N/A;  pump refill    REFILL PAIN PUMP N/A 10/27/2021    Procedure: REFILLING, ANALGESIC PUMP;  Surgeon: Jaziel Pierson MD;  Location: [facility];  Service: Pain Management;  Laterality: N/A;  Pump refill    REFILL PAIN PUMP N/A 4/13/2022    Procedure: REFILLING, ANALGESIC PUMP;  Surgeon: Jaziel Pierson MD;  Location: [facility];  Service: Pain Management;  Laterality: N/A;  Pump refill    REFILL PAIN PUMP N/A 10/5/2022    Procedure: REFILLING, ANALGESIC PUMP;  Surgeon: Jaziel Pierson MD;  Location: [facility];  Service: Pain Management;  Laterality: N/A;  Pump refill    REFILL PAIN PUMP N/A 3/29/2023    Procedure: REFILLING, ANALGESIC PUMP;  Surgeon: Jaziel Pierson MD;  Location: [facility];  Service: Pain Management;  Laterality: N/A;  Pump refill    REFILL PAIN PUMP N/A 9/20/2023    Procedure: REFILLING, ANALGESIC PUMP;  Surgeon: Jaziel Pierson MD;  Location: [facility];  Service: Pain Management;  Laterality: N/A;  pump refill    REFILL PAIN PUMP N/A 3/6/2024    Procedure: REFILLING, ANALGESIC PUMP;  Surgeon: Jaziel Pierson MD;  Location: [facility];  Service: Pain Management;  Laterality: N/A;  Pump refill    TOTAL KNEE ARTHROPLASTY Bilateral      Family History   Problem Relation Name Age of Onset    Coronary artery disease Mother       Social History     Tobacco Use    Smoking status: Former     Current packs/day: 0.75     Average packs/day: 0.8 packs/day for 10.0 years (7.5 ttl pk-yrs)     Types: Cigarettes     Passive exposure: Past    Smokeless tobacco: Current     Types: Snuff   Substance Use Topics    Alcohol use: Never     Review of Systems   Constitutional:  Negative for chills, fever and weight loss.   HENT:  Negative  for ear pain, hearing loss, rhinorrhea, sinus pressure, sore throat and tinnitus.    Eyes:  Negative for blurred vision, photophobia, pain and redness.   Respiratory:  Positive for cough. Negative for shortness of breath and wheezing.    Cardiovascular:  Negative for chest pain.   Gastrointestinal:  Negative for abdominal pain, anorexia, nausea and vomiting.   Musculoskeletal:  Positive for myalgias. Negative for back pain and neck pain.   Neurological:  Positive for headaches. Negative for dizziness, tingling, seizures, weakness, numbness and loss of balance.   Psychiatric/Behavioral:  The patient does not have insomnia.    All other systems reviewed and are negative.      Physical Exam     Initial Vitals [05/13/24 1349]   BP Pulse Resp Temp SpO2   102/65 97 17 99.8 °F (37.7 °C) 96 %      MAP       --         Physical Exam    Vitals reviewed.  Constitutional: He appears well-developed and well-nourished.   Neck: Neck supple.   Cardiovascular:  Normal rate, regular rhythm and normal heart sounds.           Pulmonary/Chest: Breath sounds normal.   Abdominal: Abdomen is soft. Bowel sounds are normal. He exhibits no distension and no mass. There is no abdominal tenderness. There is no rebound and no guarding.   Musculoskeletal:         General: Normal range of motion.      Cervical back: Neck supple.     Neurological: He is alert and oriented to person, place, and time. He has normal strength. GCS score is 15. GCS eye subscore is 4. GCS verbal subscore is 5. GCS motor subscore is 6.   Skin: Skin is warm and dry. Capillary refill takes less than 2 seconds.   Psychiatric: He has a normal mood and affect.         Medical Screening Exam   See Full Note    ED Course   Procedures  Labs Reviewed   INFLUENZA A & B BY MOLECULAR - Abnormal; Notable for the following components:       Result Value    INFLUENZA A MOLECULAR Positive (*)     All other components within normal limits   BASIC METABOLIC PANEL - Abnormal; Notable for  the following components:    Glucose 108 (*)     Creatinine 1.72 (*)     eGFR 47 (*)     All other components within normal limits   CBC WITH DIFFERENTIAL - Abnormal; Notable for the following components:    MCHC 31.8 (*)     Neutrophils % 75.0 (*)     Lymphocytes % 18.8 (*)     Eosinophils % 0.0 (*)     All other components within normal limits   SARS-COV-2 RNA AMPLIFICATION, QUAL - Normal    Narrative:     Test performed x2. Invalid result x2. Please recollect / reorder if testing still desired.   SARS-COV-2 RNA AMPLIFICATION, QUAL - Normal    Narrative:     Negative SARS-CoV results should not be used as the sole basis for treatment or patient management decisions; negative results should be considered in the context of a patient's recent exposures, history and the presene of clinical signs and symptoms consistent with COVID-19.  Negative results should be treated as presumptive and confirmed by molecular assay, if necessary for patient management.   CBC W/ AUTO DIFFERENTIAL    Narrative:     The following orders were created for panel order CBC auto differential.  Procedure                               Abnormality         Status                     ---------                               -----------         ------                     CBC with Differential[[id number]]       Abnormal            Final result                 Please view results for these tests on the individual orders.   SPOTTED FEVER GROUP ANTIBODIES   LYME DISEASE SEROLOGY          Imaging Results              CT Head Without Contrast (Final result)  Result time 05/13/24 15:53:47      Final result by Patrick Lemus DO (05/13/24 15:53:47)                   Impression:      No convincing imaging evidence of acute intracranial abnormality.    The CT exam was performed using one or more of the following dose    reduction techniques- Automated exposure control, adjustment of the mA    and/or kV according to patient size, and/or use of  iterative    reconstructed technique.    Point of Service: [facility]      Electronically signed by: Patrick Lemus  Date:    05/13/2024  Time:    15:53               Narrative:    EXAMINATION:  CT HEAD WITHOUT CONTRAST    CLINICAL HISTORY:  Headache, new or worsening (Age >= 50y);    COMPARISON:  None    TECHNIQUE:  Multiple axial tomographic images of the brain were obtained without the use of intravenous contrast.    FINDINGS:  Midline structures are nondisplaced.  No convincing evidence of acute intracranial hemorrhage.  No convincing evidence of hydrocephalus.  Visualized paranasal sinuses and mastoid air cells are predominantly clear.                                       X-Ray Chest PA And Lateral (Final result)  Result time 05/13/24 14:10:14      Final result by Patrick Lemus DO (05/13/24 14:10:14)                   Impression:      No acute cardiopulmonary process demonstrated.    Point of Service: [facility]      Electronically signed by: Patrick Lemus  Date:    05/13/2024  Time:    14:10               Narrative:    EXAMINATION:  XR CHEST PA AND LATERAL    CLINICAL HISTORY:  Cough, unspecified    COMPARISON:  Chest x-ray May 10, 2022    TECHNIQUE:  Frontal and lateral views of the chest.    FINDINGS:  The cardiomediastinal silhouette is stable in configuration.  Chronic change of the lungs without focal consolidation, pleural effusion, or pneumothorax.  Visualized osseous and surrounding soft tissue structures appear grossly unchanged.  Lower cervical fusion hardware. Surgical clips project over the right upper quadrant of the abdomen.                                       Medications   sodium chloride 0.9% bolus 1,000 mL 1,000 mL (0 mLs Intravenous Stopped 5/13/24 1620)   albuterol nebulizer solution 2.5 mg (2.5 mg Nebulization Given 5/13/24 1424)   ondansetron injection 4 mg (4 mg Intravenous Given 5/13/24 1515)   acetaminophen tablet 1,000 mg (1,000 mg Oral Given 5/13/24  1548)   ketorolac injection 15 mg (15 mg Intravenous Given 5/13/24 1725)     Medical Decision Making  54-year-old male presents to the emergency department to be evaluated for body aches, cough, headache.  His symptoms began 4 days ago after he saw a tick on him.  He is a former smoker and has an inhaler, but he reports that he really never has to use it.    Problems Addressed:  Dehydration:     Details: NS given. Counseled on supportive measures. Follow up instructions given. Warning s/s discussed and return precautions given; the patient has v/u.    Influenza A:     Details: NS, Zofran, Tylenol and Toradol given. Pt reports feeling better. S/s >72hrs. Rx for Zofran, counseled on use and supportive measures. Follow up instructions given. Warning s/s discussed and return precautions given; the patient has v/u.      Amount and/or Complexity of Data Reviewed  Labs: ordered.  Radiology: ordered.    Risk  OTC drugs.  Prescription drug management.                                      Clinical Impression:   Final diagnoses:  [R05.9] Cough  [J10.1] Influenza A (Primary)  [E86.0] Dehydration        ED Disposition Condition    Discharge Stable          ED Prescriptions       Medication Sig Dispense Start Date End Date Auth. Provider    ondansetron (ZOFRAN-ODT) 4 MG TbDL Take 1 tablet (4 mg total) by mouth every 8 (eight) hours as needed (nausea, vomiting). 15 tablet 5/13/2024 -- Tiffany Rosado FNP          Follow-up Information       Follow up With Specialties Details Why Contact Info    Otilio Vargas MD Family Medicine  As needed [address]  [facility] [address] [phone]               Tiffany Rosado FNP  05/13/24 6029

## 2024-05-13 NOTE — ED TRIAGE NOTES
"Pt reports Thursday he started having congestion, headache, cough, and "not feeling good". He also reports having a tick on him Thursday. He has a hx of luis mout. fever  "

## 2024-05-15 LAB — B BURGDOR AB SER QL IA: NEGATIVE

## 2024-05-16 LAB
RICK SF IGG TITR SER IF: NORMAL {TITER}
RICK SF IGM TITR SER IF: NORMAL {TITER}

## 2024-05-17 LAB
OHS QRS DURATION: 92 MS
OHS QTC CALCULATION: 418 MS

## 2024-05-22 ENCOUNTER — ANESTHESIA (OUTPATIENT)
Dept: PAIN MEDICINE | Facility: HOSPITAL | Age: 54
End: 2024-05-22
Payer: MEDICARE

## 2024-05-22 ENCOUNTER — HOSPITAL ENCOUNTER (OUTPATIENT)
Facility: HOSPITAL | Age: 54
Discharge: HOME | End: 2024-05-22
Attending: ANESTHESIOLOGY | Admitting: ANESTHESIOLOGY
Payer: MEDICARE

## 2024-05-22 ENCOUNTER — ANESTHESIA EVENT (OUTPATIENT)
Dept: PAIN MEDICINE | Facility: HOSPITAL | Age: 54
End: 2024-05-22
Payer: MEDICARE

## 2024-05-22 VITALS
BODY MASS INDEX: 28.01 KG/M2 | WEIGHT: 230 LBS | RESPIRATION RATE: 11 BRPM | HEART RATE: 60 BPM | TEMPERATURE: 98 F | SYSTOLIC BLOOD PRESSURE: 148 MMHG | HEIGHT: 76 IN | DIASTOLIC BLOOD PRESSURE: 83 MMHG | OXYGEN SATURATION: 98 %

## 2024-05-22 DIAGNOSIS — M47.816 LUMBAR SPONDYLOSIS: ICD-10-CM

## 2024-05-22 PROCEDURE — 64635 DESTROY LUMB/SAC FACET JNT: CPT | Mod: 50 | Performed by: ANESTHESIOLOGY

## 2024-05-22 PROCEDURE — 25000003 PHARM REV CODE 250: Performed by: NURSE ANESTHETIST, CERTIFIED REGISTERED

## 2024-05-22 PROCEDURE — 37000009 HC ANESTHESIA EA ADD 15 MINS: Performed by: ANESTHESIOLOGY

## 2024-05-22 PROCEDURE — 27000284 HC CANNULA NASAL: Performed by: NURSE ANESTHETIST, CERTIFIED REGISTERED

## 2024-05-22 PROCEDURE — 63600175 PHARM REV CODE 636 W HCPCS: Performed by: ANESTHESIOLOGY

## 2024-05-22 PROCEDURE — D9220A PRA ANESTHESIA: Mod: ,,, | Performed by: NURSE ANESTHETIST, CERTIFIED REGISTERED

## 2024-05-22 PROCEDURE — 63600175 PHARM REV CODE 636 W HCPCS: Performed by: NURSE ANESTHETIST, CERTIFIED REGISTERED

## 2024-05-22 PROCEDURE — 25000003 PHARM REV CODE 250: Performed by: ANESTHESIOLOGY

## 2024-05-22 PROCEDURE — 37000008 HC ANESTHESIA 1ST 15 MINUTES: Performed by: ANESTHESIOLOGY

## 2024-05-22 RX ORDER — PROPOFOL 10 MG/ML
VIAL (ML) INTRAVENOUS
Status: DISCONTINUED | OUTPATIENT
Start: 2024-05-22 | End: 2024-05-22

## 2024-05-22 RX ORDER — BUPIVACAINE HYDROCHLORIDE 2.5 MG/ML
INJECTION, SOLUTION INFILTRATION; PERINEURAL CODE/TRAUMA/SEDATION MEDICATION
Status: DISCONTINUED | OUTPATIENT
Start: 2024-05-22 | End: 2024-05-22 | Stop reason: HOSPADM

## 2024-05-22 RX ORDER — LIDOCAINE HYDROCHLORIDE 20 MG/ML
INJECTION, SOLUTION EPIDURAL; INFILTRATION; INTRACAUDAL; PERINEURAL
Status: DISCONTINUED | OUTPATIENT
Start: 2024-05-22 | End: 2024-05-22

## 2024-05-22 RX ORDER — ORPHENADRINE CITRATE 30 MG/ML
INJECTION INTRAMUSCULAR; INTRAVENOUS
Status: DISCONTINUED | OUTPATIENT
Start: 2024-05-22 | End: 2024-05-22

## 2024-05-22 RX ORDER — TRIAMCINOLONE ACETONIDE 40 MG/ML
INJECTION, SUSPENSION INTRA-ARTICULAR; INTRAMUSCULAR CODE/TRAUMA/SEDATION MEDICATION
Status: DISCONTINUED | OUTPATIENT
Start: 2024-05-22 | End: 2024-05-22 | Stop reason: HOSPADM

## 2024-05-22 RX ORDER — SODIUM CHLORIDE 9 MG/ML
500 INJECTION, SOLUTION INTRAVENOUS CONTINUOUS
Status: DISCONTINUED | OUTPATIENT
Start: 2024-05-22 | End: 2024-05-22 | Stop reason: HOSPADM

## 2024-05-22 RX ADMIN — PROPOFOL 100 MG: 10 INJECTION, EMULSION INTRAVENOUS at 09:05

## 2024-05-22 RX ADMIN — ORPHENADRINE CITRATE 60 MG: 30 INJECTION INTRAMUSCULAR; INTRAVENOUS at 09:05

## 2024-05-22 RX ADMIN — SODIUM CHLORIDE: 9 INJECTION, SOLUTION INTRAVENOUS at 09:05

## 2024-05-22 RX ADMIN — LIDOCAINE HYDROCHLORIDE 50 MG: 20 INJECTION, SOLUTION INTRAVENOUS at 09:05

## 2024-05-22 NOTE — ANESTHESIA PREPROCEDURE EVALUATION
05/22/2024  Jesse Andrade is a 54 y.o., male.      Pre-op Assessment    I have reviewed the Patient Summary Reports.     I have reviewed the Nursing Notes. I have reviewed the NPO Status.   I have reviewed the Medications.     Review of Systems  Anesthesia Hx:  No problems with previous Anesthesia                Cardiovascular:     Hypertension                                        Pulmonary:        Sleep Apnea                    Physical Exam  General: Well nourished, Cooperative, Alert and Oriented    Airway:  Mallampati: II   Mouth Opening: Normal  TM Distance: Normal  Tongue: Normal  Neck ROM: Normal ROM    Dental:  Intact        Anesthesia Plan  Type of Anesthesia, risks & benefits discussed:    Anesthesia Type: Gen Natural Airway, MAC  Intra-op Monitoring Plan: Standard ASA Monitors  Post Op Pain Control Plan: multimodal analgesia  Induction:  IV  Informed Consent: Informed consent signed with the Patient and all parties understand the risks and agree with anesthesia plan.  All questions answered. Patient consented to blood products? Yes  ASA Score: 3  Day of Surgery Review of History & Physical: I have interviewed and examined the patient. I have reviewed the patient's H&P dated: There are no significant changes.     Ready For Surgery From Anesthesia Perspective.     .  Patient Active Problem List   Diagnosis    Chronic pain syndrome    Non-ischemic cardiomyopathy    Hypertension    Hyperlipidemia      Past Medical History:   Diagnosis Date    Arthritis     Hyperlipidemia     Hypertension     Sleep apnea        Past Surgical History:   Procedure Laterality Date    CERVICAL SPINE SURGERY      fusion    EPIDURAL STEROID INJECTION INTO CERVICAL SPINE Right 6/7/2023    Procedure: INJECTION, STEROID, SPINE, CERVICAL, EPIDURAL;  Surgeon: Jaziel Pierson MD;  Location: [facility];  Service:  Pain Management;  Laterality: Right;  C6-7 cath guided ARTHUR with right bias    EPIDURAL STEROID INJECTION INTO CERVICAL SPINE Left 10/25/2023    Procedure: INJECTION, STEROID, SPINE, CERVICAL, EPIDURAL;  Surgeon: Jaziel Pierson MD;  Location: [facility];  Service: Pain Management;  Laterality: Left;  Left C6-7 cath guided ARTHUR    FACIAL FRACTURE SURGERY      INJECTION OF ANESTHETIC AGENT AROUND MEDIAL BRANCH NERVES INNERVATING CERVICAL FACET JOINT Bilateral 12/20/2023    Procedure: BLOCK, NERVE, FACET JOINT, CERVICAL, MEDIAL BRANCH;  Surgeon: Jaziel Pierson MD;  Location: [facility];  Service: Pain Management;  Laterality: Bilateral;    INJECTION OF ANESTHETIC AGENT AROUND MEDIAL BRANCH NERVES INNERVATING CERVICAL FACET JOINT Bilateral 4/17/2024    Procedure: BLOCK, NERVE, FACET JOINT, CERVICAL, MEDIAL BRANCH;  Surgeon: Jaziel Pierson MD;  Location: [facility];  Service: Pain Management;  Laterality: Bilateral;  Torey C5-7 MBNB    INJECTION OF ANESTHETIC AGENT AROUND MEDIAL BRANCH NERVES INNERVATING LUMBAR FACET JOINT Bilateral 10/26/2022    Procedure: BLOCK, NERVE, FACET JOINT, LUMBAR, MEDIAL BRANCH;  Surgeon: Jaziel Pierson MD;  Location: [facility];  Service: Pain Management;  Laterality: Bilateral;  Bilateral L2-5 MBNB    RADIOFREQUENCY ABLATION OF LUMBAR MEDIAL BRANCH NERVE AT SINGLE LEVEL Right 12/14/2022    Procedure: RADIOFREQUENCY ABLATION, NERVE, SPINAL, LUMBAR, MEDIAL BRANCH, 1 LEVEL;  Surgeon: Jaziel Pierson MD;  Location: [facility];  Service: Pain Management;  Laterality: Right;  Right L3-5 RFTC    RADIOFREQUENCY ABLATION OF LUMBAR MEDIAL BRANCH NERVE AT SINGLE LEVEL Bilateral 7/26/2023    Procedure: RADIOFREQUENCY ABLATION, NERVE, SPINAL, LUMBAR, MEDIAL BRANCH, 1 LEVEL;  Surgeon: Jaziel Pierson MD;  Location: [facility];  Service: Pain Management;  Laterality: Bilateral;  Bilateral L3-5 RFTC    REFILL PAIN PUMP N/A  4/14/2021    Procedure: REFILLING, ANALGESIC PUMP;  Surgeon: Jaziel Pierson MD;  Location: [facility];  Service: Pain Management;  Laterality: N/A;  pump refill    REFILL PAIN PUMP N/A 10/27/2021    Procedure: REFILLING, ANALGESIC PUMP;  Surgeon: Jaziel Pierson MD;  Location: [facility];  Service: Pain Management;  Laterality: N/A;  Pump refill    REFILL PAIN PUMP N/A 4/13/2022    Procedure: REFILLING, ANALGESIC PUMP;  Surgeon: Jaziel Pierson MD;  Location: [facility];  Service: Pain Management;  Laterality: N/A;  Pump refill    REFILL PAIN PUMP N/A 10/5/2022    Procedure: REFILLING, ANALGESIC PUMP;  Surgeon: Jaziel Pierson MD;  Location: [facility];  Service: Pain Management;  Laterality: N/A;  Pump refill    REFILL PAIN PUMP N/A 3/29/2023    Procedure: REFILLING, ANALGESIC PUMP;  Surgeon: Jaziel Pierson MD;  Location: [facility];  Service: Pain Management;  Laterality: N/A;  Pump refill    REFILL PAIN PUMP N/A 9/20/2023    Procedure: REFILLING, ANALGESIC PUMP;  Surgeon: Jaziel Pierson MD;  Location: [facility];  Service: Pain Management;  Laterality: N/A;  pump refill    REFILL PAIN PUMP N/A 3/6/2024    Procedure: REFILLING, ANALGESIC PUMP;  Surgeon: Jaziel Pierson MD;  Location: [facility];  Service: Pain Management;  Laterality: N/A;  Pump refill    TOTAL KNEE ARTHROPLASTY Bilateral        Family History   Problem Relation Name Age of Onset    Coronary artery disease Mother         Social History     Socioeconomic History    Marital status:    Tobacco Use    Smoking status: Former     Current packs/day: 0.75     Average packs/day: 0.8 packs/day for 10.0 years (7.5 ttl pk-yrs)     Types: Cigarettes     Passive exposure: Past    Smokeless tobacco: Current     Types: Snuff   Substance and Sexual Activity    Alcohol use: Never       Current Facility-Administered Medications   Medication Dose Route Frequency  Provider Last Rate Last Admin    0.9%  NaCl infusion  500 mL Intravenous Continuous Jaziel Pierson MD           Review of patient's allergies indicates:   Allergen Reactions    Keflex [cephalexin]

## 2024-05-22 NOTE — TRANSFER OF CARE
"Anesthesia Transfer of Care Note    Patient: Jesse Andrade    Procedure(s) Performed: Procedure(s) (LRB):  RADIOFREQUENCY ABLATION, NERVE, SPINAL, LUMBAR, MEDIAL BRANCH, 1 LEVEL (Bilateral)    Patient location: Other:    Anesthesia Type: MAC    Transport from OR: Transported from OR on room air with adequate spontaneous ventilation    Post pain: adequate analgesia    Post assessment: no apparent anesthetic complications    Post vital signs: stable    Level of consciousness: responds to stimulation    Nausea/Vomiting: no nausea/vomiting    Complications: none    Transfer of care protocol was followed      Last vitals: Visit Vitals  BP (!) 140/87   Pulse 76   Temp 36.8 °C (98.2 °F) (Oral)   Resp 12   Ht 6' 4" (1.93 m)   Wt 104.3 kg (230 lb)   SpO2 98%   BMI 28.00 kg/m²     "

## 2024-05-22 NOTE — H&P
"[facility] - Pain Management  Pain Management  H&P    Patient Name: Jesse Andrade  MRN: [id number]  Admission Date: 2024  Primary Care Provider: Otilio Vargas MD    Patient information was obtained from     Subjective:     Principal Problem:  Mary Torres FNP  [address]  [place] [address]  [phone]                   RE: Jesse Andrade      : 1970   Date of Service: 2024   Procedure Follow-Up Bilateral C5-C7 MBNB #2 pre 9 post 0 with     100% relief   Existing Patient           Chief Complaint:   Neck pain described as aching, constant, dull, sharp; Location bilaterally, at the midline, radiating to shoulder(s); aggravated by activity, poor posture; relieved by narcotics, rest-lying down; onset gradual, constant; reported as 6/10 on pain scale,  Back pain achy in nature, constant, dull; located in the lumbar region, in the midline; aggravated by activity, standing, bending, lifting, walking, weather/temperature change; relieved by analgesics, rest; gradual in occurrence; pain rated as 4/10 on the pain scale,  Knee pain Location right patella; characterized as aching aggravated by walking Timing constant Severity moderate to severe.   Patient seated in room 6 with c/o neck and right hip pain, reports pain is unchanged since last visit  Did not have procedure        [place] Prescription Monitoring Program data was reviewed for this patient for the past 12 calendar months to ascertain any current, or past use of scheduled medications.      History of Present Illness:   What part of the body? neck and right hip   Pain level at best 3; Pain level at worst 6; Pain level at present 7; Pain level on average 6   53 y/o BM with complaints of "low back pain and Right knee pain"; objective data essentially unchanged from previous visit; he was able to have his Bilateral C5-C7 MBNB #2 that improved pain by 100% for several days; he is now having more pain to lower back and " is ready to schedule his Lumbar RFTC; his last lumbar RFTC was July 2023 that improved by 80% and has lasted for over 9 months; he had his Pump refill in March; he has driven to [place] for a death in the family; he is having more neck pain and pain to both shoulders; states that pain improved by 75% after Bilateral C3-C5 MBNB #1 and is still doing good with pain relief; he is still doing good with numbness and tingling and has been able to sleep better; his last cervical ARTHUR helped with numbness to LUE but MBNB helped pain to neck; he was able to have his pump refilled and will be due again in March; he is still having more left side neck pain with numbness to fingers; states that pain improved by 80% after Bilateral L4-L5 RFTC and still continues to do well with lower back pain relief; states that pain improved by 85% after cervical ARTHUR but will occasionally have some LUE tingling that lasted for several weeks but is now ready to have an additional injection to help with neck pain without radicular symptoms; his last cervical RF was June 2022; he was able to have his Lumbar RFTC done in Dec but only did the Right side because he took his ASA that morning; he actually had about 80% improvement of pain; pain is only worse with increased movements and first in the morning but improves throughout the day; he has already had his pump refill on 10/05 as well as #2 MBNB that improved by 85% that lasted for a couple of weeks; states that pain improved by 90% that lasted about 2 weeks after Bilateral L2-L5 MBNB #1; he has just recently had his Cervical RFTC in June that has improved his pain and is not having much pain at all to his cervical spine; states that pain improved by 80% after Left C3-C7 RFTC in Nov 2021; overall, pain has been about the same except for some worsening lower back pain; states that pain improved by 80% after Bilateral C3-C7 MBNB that lasted for about a week; pain does get better with movement but  tingling to his fingers has improved; we will consider ARTHUR on return if tingling persists/returns but this has improved since last procedure; pain improved by 85% after Bilateral L3-L5 MBNB in 09/2021 that lasted for several months and is worse now with bending and moving; he was able to have his CT Lspine 01/2021 that revealed some changes but the last injection helped with pain; he was also able to have his pump refilled in Oct and will be due again in April;  states that he has started walking some but causes worsening pain; states that pain improved by 75% after Bilateral C3-C7 MBNB in Dec that lasted for several months but pain is slowly returning and is ready to schedule a neck injection to help with radicular symptoms at this time; denies any radiation of pain into legs and no numbness or tingling to upper or lower extremities; exercise has helped some but pain is still there; states that neck pain has been worse the past several months that wakes him up at night; he is now having more pain that prevents sleeping and from doing every day activity; states that pain improved by 75% after his Bilateral C3-C7 RFTC that lasted for several months; states that pain improved after Bilateral C3-C7 #1 about 80% and then has had a 80% improvement in pain after his Bilateral C3-C7 FI#2 as well as had 50% improvement of ADL's; He has been going to physical therapy after having a R knee replacement in Jan 2019 by Dr. Garcia and has had more pain to knee but Percocet is helping better with that pain as well; he is still exercising some but continues to have pain and swelling; usually ice, rest and meds help with this; he sees Dr. Garcia again in July 2020 but feels like it is not healing as well as it should; he has been going to the gym to help with pain and strengthening; states he has been doing well and is better today since last visit after having his last injection; He describes the pain as dull pain that is constant;  he has also started taking testosterone injections at home and still has 1 RF left at pharmacy; denies any issues with constipation; He has been experiencing some tingling and numbness to his R thigh; he started Neurontin 300mg BID since last visit and states it is helping with the numbness and tingling sensation and request to continue with 600 mg daily; also states that the Flexeril has helped better with muscle spasms; he is due for his next pump refill      UDS: consistent x 20; inconsistent x 3 (no percocet, +Norco that he had left over from the past but instructed that this can not happen again) UDS due today   The previous urine drug screen was evaluated, and it was compliant for the medications that has been prescribed. A presumptive urine drug screen was done today to rapidly obtain and integrate results into clinical assessment and decision-making for ongoing safe prescribing of controlled substances. The results of the presumptive UDS done today was positive for opiates. He is prescribed oxycodone. Because presumptive UDS positive results are not definitive due to sensitivity and specificity and cross reactivity limitations and negative results do not necessarily indicate absence of drugs or substances in the urine specimen, confirmation will identify specific prescribed and non-prescribed medications or illicit use for ongoing safe prescribing of controlled substances including benzodiazepines, opioid agonist, opioids antagonist, partial agonist, stimulants, muscle relaxers, antidepressants, sleep aids, anti-seizure medicine, and alcohol. Urine drug analysis is used to assist with diagnosis and therapeutic decision-making concerning pretreatment assessment. Intensity and frequency of monitoring with urine drug testing will be based on the risk stratification method in determining risk level for opioid addiction.     Meds: Percocet --due 05/08/2024; requests refills on meds and states that meds help with  his pain; no misuse of meds and no opioid abuse;  reviewed and is appropriate; he is currently prescribed 30 mg of morphine equivalent meds/day      Nursing:   Pain Medication/Dose/Last Taken/# Taken  ms pump  oxycodone  pain level with medication is  4/10 and without is a 8/10     Is it helping? Yes  Physical Therapy  no Home Exercises  Is it helping? Yes     no New medical problems or surgeries  no New medications  no New allergies  no New antibiotics, fever, infection      Allergies:   Allergies Reviewed - 24 10:48:05 AM CST  Keflex       Current Medications:   Medications List Reviewed (24 10:48:02 AM CST)  Ketorolac Tromethamine Injection Solution 60 MG/2ML (2023) From 2023 11:15 AM to 2023 11:30 AM  dexAMETHasone Sodium Phosphate Injection Solution 4 MG/ML (2023) From 2023 11:15 AM to 2023 11:30 AM  Diclofenac Sodium External Gel 1 % (2024) Apply 2 gram applications three times a day for 30 day(s)  Cyclobenzaprine HCl Oral Tablet 10 MG (2024) Take 1 tablet twice a day as needed for 30 day(s)  Gabapentin Oral Tablet 600 MG (2024) Take 1 tablet twice a day for 30 day(s)  Vitamin D3 Oral Capsule 1.25 MG (16386 UT) (2024) Take 1 capsule once a week for 4 week(s)  Percocet Oral Tablet  MG (2024) Take 1 tablet twice a day as needed for 30 day(s)  Testosterone Cypionate Intramuscular Solution 200 MG/ML (2023) Inject 1/2 milliliter IM every 72 hours  Morphine Sulfate Intramuscular Device 10 MG/0.7ML (2017) Morphine Pain Pump  Aspirin Oral Tablet Delayed Release 325 MG (2017) Take 1 tablet twice a week for 30 week(s)  Atorvastatin Calcium Oral Tablet 10 MG (2017) Take 1 tablet once a day for 30 day(s)      Previous Studies:  CT SCAN  Final Report  CT CTIC SPINE LUMBAR W/O CONTRAST    Show Printer-Friendly Version with Images (4 of 5)  Show Printer-Friendly Version without images Patient Name: Jesse Andrade:  "Mar-   ID: [id number]([facility])   Study Date: Sept- 08:03             Studies- CTIC SPINE LUMBAR W/O CONTRAST Indications- Lumbar radiculopathy. Loose leads on pain pump Comparison- None. Technique- CT of the lumbar spine was performed without the administration of intravenous contrast. Axial, sagittal, and coronal series were submitted for interpretation. Findings- Alignment of the lumbar vertebral bodies is normal. Intervertebral disc spaces as well as vertebral body heights are well maintained throughout the lumbar spine. Facet joints have normal anatomic relationships and minimal degenerative change. No acute fractures are demonstrated. The imaged intra-abdominal and intrapelvic contents demonstrate no evidence of acute pathology. Visualized portion of the "lead" demonstrates no evidence of discontinuity. Multiple enlarged lymph nodes are demonstrated within the ileocolic mesentery. These measure 7 mm in short axis dimension. Additional enlarged lymph nodes in the paracaval region measuring up to 6-7 mm. Borderline bilateral intrapelvic lymph nodes measuring up to 8 mm short axis dimension are demonstrated. Conclusion- 1. Multiple intrapelvic and mesenteric lymph nodes are demonstrated which are borderline in size to minimally enlarged. When compared to additional imaging of the abdomen and pelvis performed February 4, 2014, these lymph nodes appear to have minimally increased in size as well as number. Significance of these lymph nodes and relevant to patient's back pain is uncertain. Correlation recommended. 2. No significant abnormality of the lumbar spine is demonstrated. 3. No break in continuity of the demonstrated lead is present. 9/21/2016 8-30 AM Ordering physician-SALINAS SINGH MD Point of service- [facility]. This report has been electronically signed by Ryley Edge - MARYBEL Reading Radiologist- RYLEY EDGE II, M.D. Releasing Radiologist- RYLEY EDGE " FRANCIS PEACE Released Date Time- 16 0845 ------------------------------------------------------------------------------     Signed by: Husam, Generated    Signed on:  08:30    [facility]  Impression:  1. no acute fracture or subluxation within the lumber spine  2. Mild degenerative disc disease and facet/unconvertebral arthropathy at L5/S1. Moderate symmetric bilateral neuroforaminal stenoses at L5/S1  3. Very mild to mild midline broad-based posterior intervertebral disc bulges from L3/L4-L5/S1 without high grade spinal canal stenosis    CT [facility]spine at [facility]  2023  Impression:  multilevel degenerative changes throughout the cervical spine; previous C5-C7 ACDF         ; X-RAY  Final Report  CR XR RIBS LEFT WITH PA/AP CHEST    Show Printer-Friendly Version Patient Name: Jesse Andrade   : Mar-   ID: [id number]([facility])   Study Date:  13:59             AP chest with left rib detail. Indication- Fall, with left chest wall pain. The heart size is normal. There is elevation of the right hemidiaphragm, stable finding. Surgical clips in the right upper quadrant. Postsurgical changes in the cervical spine. No pneumothorax. No pleural effusion. No rib fracture is seen. Impression- No acute abnormality. Place of service- [facility] This report has been electronically signed by Mariah Saucedo - MARYBEL Kaplan Physician- MARIAH SAUCEDO M.D. Releasing Physician- MARIAH SAUCEDO M.D. Released Date Time- 18 1419 ------------------------------------------------------------------------------     Signed by: Husam, Generated    Signed on:  14:13  Final Report  CR CR SPINE CERVICAL AP AND LATERAL  Show Printer-Friendly Version Patient Name: Jesse Andrade    : Mar-    ID: [id number]([facility])    Study Date: Mar- 12:39       CR SPINE CERVICAL AP AND LATERAL    Indication- Cervicalgia    Comparison- Cervical spine x-ray Shirley 15,  2005    Technique- Frontal and lateral views of the cervical spine.    Findings-     There is straightening of normal cervical lordosis which may be  positional or secondary muscle spasm. There is no significant  anterolisthesis or retrolisthesis.  Anterior cervical fusion hardware at  C5-C7 with osseous fusion. Mild marginal osteophyte formation noted at  C3-4 and C4-5. The C7-T1 level is not well visualized on lateral view.  No gross evidence of significant vertebral body height loss.    IMPRESSION-    Degenerative change and malalignment of the cervical spine as detailed  above.Anterior cervical fusion hardware at C5-C7 with osseous fusion.       Point of Service- [facility]    This report has been electronically signed by Santhosh Lemus          -  MARYBEL Kaplan Physician- SANTHOSH LEMUS D.O.       Releasing Physician- SANTHOSH LEMUS D.O.       Released Date Time- 03/26/19 1409   ------------------------------------------------------------------------------    Signed by: Serena Weiner Signed on: Mar- 13:58  ; Findings  LAB results from Dr. Daley:  July 2018  Vitamin D  10.8  Aug 2018  PSA  0.989  Testosterone 255  Total Testosterone 226  Free Testosterone 4.75     C spine X-ray 03/26/2019      Past Medical History:   The patient has a past medical history of  Hypertension, High Cholesterol, Osteoarthritis (OA), Joint Pain.  There is no past medical history of  Cardiac Pacer, Diabetes Mellitus type 1, Diabetes Mellitus type 2, Chronic Obstructive Pulmonary Disease, Rheumatoid Arthritis, Gastroesophageal Reflux Disease, Cerebrovascular Accident (CVA), Cardiovascular Disease, Alcoholism, Crohn's disease, Terminal Illness.   severe dementianutritional quality good      Social History:      Smoking Status: Light tobacco smoker; Last Reviewed: 05/01/2024            Pack-years: 1         Date quit smoking: 10 years ago      Alcohol use: Non-Drinker  Racial background:   American  Occupation: disabled  Marital status:   Patient knows the purpose/use of medications  Patient is taking medications as prescribed               Family History:   Father  History remarkable for  gun shot.   Age 22;       Mother  History remarkable for  Coronary Artery Disease.   Age 42;       Review of Systems:   General:  Patient denies  sweats, fatigue, fever, chills.  Ears, Nose and Throat:  Patient denies  hearing loss, ringing in the ears.  Cardiovascular:  Patient denies  chest pain.  Respiratory:  Patient denies  shortness of breath.  Gastrointestinal:  Patient denies  nausea, vomiting, diarrhea, constipation.  Genitourinary:  Patient denies  urinary frequency, prostate problems.  Endocrine:  Patient denies  thyroid problems.  Hematologic:  Patient denies  bleeding tendencies, easy bruising tendency.  Musculoskeletal:  Patient denies  joint pain, walking aids.  Neurologic  Patient denies  seizures, headache.  Psychologic:  Patient denies  anxiety, panic attacks, depression.  Skin:  Patient denies  skin rash.       DEPRESSION SCREENING:   Not at all the patient reports little interest or pleasure in doing things.  Not at all the patient reports feeling down, depressed, or hopeless.  Date Depression Screening Last Done: 2020   PHQ-2 Score 0; PHQ-9 Score incomplete   Several days the patient reports little interest or pleasure in doing things.  Several days the patient reports feeling down, depressed, or hopeless.  Date Depression Screening Last Done: 2019      Vital Signs:   Weight 256 lbs; Height 6 ft 4 in; BMI 31.2   2024 10:26 AM (CST)  Temperature 98.6 °F; Respiration Rate 18   2024 10:29 AM (CST)  Respiration Rate 18; Pulse Rate 55 bpm; Blood Pressure 163 / 92 mm/Hg; Pain Level: 7         Physical Examination:   Pre Anesthesia evaluation  Pre Op dx: cervical radiculopathy  Planned procedure: Cath guided C6-C7 ARTHUR with right bias  Age: 53  Ht:  "6'4"  Wt: 237 lbs  BMI: 28.9  Allergies: Keflex  Meds/Labs/Test  Prior Surgeries:  Anethesia complications: none known     Medical History  CNS: _X_Neg.   __Seizures  __CVA  __TIA  __HA  __Depression  Cardiac: __Neg  __CAD  __Stents  __MI  _X_HTN  __CHF  Pulmonary: __Neg  __COPD  __Asthma  __Sleep Apnea  __Smoker PPD  __CPAP  GI:_X_Neg.  __Reflux  __Liver Dysfunction  __Hepatitis  __Hiatal Hernia  __Hepatitis  __ETOH  __GERD   Renal:  _X_Neg.  __CRI  __ESRD  Endocrine:  _X_Neg.  __Thyroid  __Diabetes  Heme:  _X_Neg.   __Blood thinners  __other     Cranial Nerves II-XII grossly intact.  No apparent distress.  Patient is alert and oriented times three.  No somnolence or slurred speech.     Lumbar spine has pain with flexion and extension lateral rotation  Lumbar facets are tender to palpation  No focal neurologic deficits noted  physical exam essentially unchanged from previous   Back Motion:   Lumbar / lumbosacral spine abnormal.         Additional Physical Findings:  General general appearance normal appears comfortable,   Oriented to time, place and person, pleasant, seated  Not uncomfortable  Head normal head exam  Eyes normal eye exam  Chest normal chest exam  Respiratory normal respiratory exam  Musculoskeletal abnormal low back pain and knee pain,   Joint tenderness  Posture normal  Neurologic normal neurologic exam  Skin normal skin exam       Toxicology Report   Toxicology was performed.   Reason for Toxicology:  A presumptive urine drug screen was done today to rapidly obtain and integrate results into clinical assessment and decision-making for ongoing safe prescribing of controlled substances.                                       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.1) - Testicular hypofunction  (M54.16) - Lumbar radiculopathy  (M47.812) - Cervical spondylosis without myelopathy  (M47.817) - Lumbosacral spondylosis  (M54.12) - " Cervical radiculopathy  (M54.2) - Neck pain  (G89.4) - Chronic pain syndrome      Plan:   Follow up visit 6 weeks      -refilled Percocet -- due 05/08/2024  -gave rx for Shirley with hold date 06/07/2024   -scheduled Bilateral L3-L5 RFTC at [facility] on 05/22/2024 at 8:45 am   -will consider cervical RATHUR on return visit   -refilled Testosterone with 2 RF (does not need refills)   -we have held Testosterone due to abnormal blood work and pt has an appt with Dr. Graves on 07/03/2024 at 8:00 am   -Sent rx for Flexeril 10 mg TID, Gabapentin 600 mg BID  and Voltaren gel to [facility] with 5 RF (due again in Nov)   -drink plenty of fluids and water  -increase fiber in diet  -call sooner with worsening pain  -pump refill was done on 03/06/2024  -next alarm date will be 08/30/2024  -refill Vit D [id number] unit weekly to [facility] with 5 RF  -will did receive cardiac clearance from Dr. Arteaga          Monitored Anesthesia Care medical necessity authorization request:   Monitor anesthesia request is medically indicated for the scheduled _cervical MBNB_______procedure due to:     - needle phobia and anxiety, placing the patient at risk during the provided service._YES____  - patient has a BMI greater than 45 ____  - patient has severe sleep apnea for which BiPAP and oxygen are needed while sleeping._____  - patient is unable to follow simple commands due to mental state.____  - patient has an ASA class greater than 3 and requires constant presence of an anesthesiologist/CRNA during the procedure.____  - patient has severe problems with muscles and muscle spasticity that makes it hard to lie still. ____  - patient suffers from chronic pain and is unable to function due to diminished ADL's._YES___  - patient is dependent on opioids or sedatives. _YES___   - Other __YES__        Patient has a medical problem of intermediate acuity. The medical condition is not life threatening but poses a significant impact on morbidity and/or impacts the  patients activities of daily living. This procedure is medically necessary to reduce pain and improve functionality.     Indications for this procedure for this specific patient include the following:   - Pt has had symptoms for three months with moderate to severe pain with functional impairment rated of  /10 pain.   - Pain non-responsive to conservative care.  - Pain predominately axial and not associated with radiculopathy or claudication.  - No non-facet pathology as source of pain.  - Clinical assessment implicates facet joint as putative pain source.   - Pain is exacerbated by extension or prolonged sitting/standing and relieved by rest.   - No unexplained neurologic deficit.   - No history of coagulopathy , infection or unstable medical conditions.  - Pain is causing significant functional limitation resulting in diminished quality of life and impaired age appropriate ADL's.  - Repeat injections not done prior to 7 days.  - No more than 2 levels will be done per side.     NSAIDS Failure_YES___  Pain for 3 months or >_YES____  Pain level 6> intermittent or continuous__YES__  Physical exam with documented signs that facets are the primary source of pain_YES___              NARCOTIC STATEMENT  Patient is taking the narcotic pain medications as prescribed. Refill is being given because of the benefit to the patient in regards to the pain. Patient has agreed not to abuse of medication and not to take it more than what is prescribed. The nature of the drug including the potential for addiction and dependency and abuse was also discussed with the patient. Patient has developed physical dependency for the narcotic pain medication for his pain relief.  Patient has also developed tolerance to the sedative effect of the narcotic pain medications.  Patient has decided to continue with these medications despite potential for addiction as described by this office.  This was stressed to the patient that it is the patient's  responsibility to secure the narcotic medication and in any event of loss for any reason whatsoever,  there will be no refill before the next due date. Patient also understands that they are not supposed to drive or work on machinery while taking these medications.  Also explained to the patient that in the event of traffic citation, the presence of this drug in  bloodstream may result in DUI.  Patient has been advised not to drink alcohol while taking this medication.  Patient has verbalized understanding of our office policy and has signed a contract with us in this regard.                  Prescriptions Written Today:  Diclofenac Sodium External Gel 1 %  Apply 2 gram applications three times a day for 30 day(s)  Refills: 5  Rx quantity: 180,  Cyclobenzaprine HCl Oral Tablet 10 MG  Take 1 tablet twice a day as needed for 30 day(s)  Refills: 5  Rx quantity: 60,  Gabapentin Oral Tablet 600 MG  Take 1 tablet twice a day for 30 day(s)  Refills: 5  Rx quantity: 60,  Vitamin D3 Oral Capsule 1.25 MG (95962 UT)  Take 1 capsule once a week for 4 week(s)  Refills: 5  Rx quantity: 4,  Percocet Oral Tablet  MG  Take 1 tablet twice a day as needed for 30 day(s)  Refills: No Refills  Rx quantity: 60  Take 1 tablet twice a day as needed for 30 day(s)  Refills: No Refills  Rx quantity: 60                 Mary Torres       Electronically signed: 5/1/2024 4:48:19 PM      Jaziel Pierson MD      Electronically signed: 5/3/2024 11:45:25 AM       Chief Complaint:      HPI:       Assessment/Plan:         Jaziel Pierson MD  Pain Management  [facility] - Pain Management

## 2024-05-22 NOTE — OP NOTE
05/22/2024  Jesse Andrade 1970    PREOPERATIVE DIAGNOSIS:         Lumbar Spondylosis without Myelopathy                                                              Low Back Pain  POSTOPERATIVE DIAGNOSIS:      Lumbar Spondylosis without Myelopathy                                                              Low Back Pain     PROCEDURE:  Bilateral L4 -L5 Radiofrequency Thermocoagulation of the Medial Branch Nerves     SURGEON: Dr. Jaziel Pierson   ANESTHESIA:  MAC              COMPLICATIONS:  None  DRAINS AND PACKS:  None            BLOOD LOSS:  None  The patient was identified in the holding area.  The risks and benefits of the procedure were again explained to the patient and the patient agreed to proceed.  The patient was brought in stable condition to the operating room and placed in the prone position on the C-Arm table.  All pressure points were checked and padded comfortably with the patient awake.  Standard ASA monitors were applied.  The patients back was prepped and draped in the usual sterile fashion.  Time out was completed.  Anesthesia was initiated and a skin wheal was raised over the target areas using  Bupivacaine 0.25% (2.5mg/ml) 1ml on the left side at  L4 and L5.  Under direct fluoroscopic guidance through anesthetized skin a 150 millimeter 10mm 18gage curved active tip radiofrequency thermocoagulation needle was advanced down to the target area at the junction between the superior articular process and transverse process of the corresponding levels.  Stimulation of the medial branch nerve was carried out and was less than 1.0 at all levels and motor was greater than 2.0 at each level.  The patient then has a 1 milliliter allotment of 0.25 % Bupivacaine (2.5mg/ml)  was injected at each level.  The patient then had a lesioning process of 80 degrees celsius for 105 five second times two runs.  The patient then through each cannula received a 1 milliliter allotment of a solution that  contained Kenalog 40mg/ml 1/2ml  diluted in 9 milliliters of 0.25%  Bupivacaine (2.5mg/ml).  The stylettes were removed with the tips intact. The procedure was repeated on the right as described above. There was adequate hemostasis at the conclusion of the procedure. The patient tolerated the procedure well with no adverse events and no complications. The patient was taken in stable condition to the holding area and monitored for the appropriate time of convalescence.  Preoperative pain score was 7/10. Postoperative pain score was    /10.

## 2024-05-22 NOTE — ANESTHESIA POSTPROCEDURE EVALUATION
Anesthesia Post Evaluation    Patient: Jesse Andrade    Procedure(s) Performed: Procedure(s) (LRB):  RADIOFREQUENCY ABLATION, NERVE, SPINAL, LUMBAR, MEDIAL BRANCH, 1 LEVEL (Bilateral)    Final Anesthesia Type: MAC      [facility] location: [facility]  Patient participation: Yes- Able to Participate  Level of consciousness: awake and alert  Post-procedure vital signs: reviewed and stable  Pain management: adequate  Airway patency: patent    PONV status at discharge: No PONV  Anesthetic complications: no      Cardiovascular status: blood pressure returned to baseline, hemodynamically stable and stable  Respiratory status: unassisted  Hydration status: euvolemic  Follow-up not needed.  Comments: Pt voices appreciation for care              Vitals Value Taken Time   /88 05/22/24 1018   Temp 36.8 °C (98.2 °F) 05/22/24 0942   Pulse 61 05/22/24 1018   Resp 13 05/22/24 1018   SpO2 98 % 05/22/24 1018   Vitals shown include unfiled device data.      Event Time   Out of Recovery 10:10:00         Pain/Alexi Score: Alexi Score: 10 (5/22/2024 10:00 AM)

## 2024-05-22 NOTE — PLAN OF CARE
Plan:  D/c pt via wheelchair at 1025  Informed pt if does not void in 8 hours to go to ER. Notify if redness, drainage, from injection site or fever over next 3-4 days. Rest and drink plenty of fluids for the remainder of the day. No lifting over 5 lbs. For the remainder of the day. Continue regular medications as prescribed. May take pain medications as prescribed.     Pain improved 40%

## 2024-05-22 NOTE — DISCHARGE SUMMARY
Patient underwent  Bilateral L4 -L5 Radiofrequency Thermocoagulation of the Medial Branch Nerves    procedure 05/22/2024. The pt will follow up in clinic. Discharged home. Discharge Dx: Lumbar Spondylosis without Myelopathy

## 2024-05-31 ENCOUNTER — EXTERNAL CHRONIC CARE MANAGEMENT (OUTPATIENT)
Dept: FAMILY MEDICINE | Facility: CLINIC | Age: 54
End: 2024-05-31
Payer: MEDICARE

## 2024-05-31 PROCEDURE — G0511 CCM/BHI BY RHC/FQHC 20MIN MO: HCPCS | Mod: ,,, | Performed by: FAMILY MEDICINE

## 2024-06-30 ENCOUNTER — EXTERNAL CHRONIC CARE MANAGEMENT (OUTPATIENT)
Dept: FAMILY MEDICINE | Facility: CLINIC | Age: 54
End: 2024-06-30
Payer: MEDICARE

## 2024-06-30 PROCEDURE — G0511 CCM/BHI BY RHC/FQHC 20MIN MO: HCPCS | Mod: ,,, | Performed by: FAMILY MEDICINE

## 2024-07-31 ENCOUNTER — EXTERNAL CHRONIC CARE MANAGEMENT (OUTPATIENT)
Dept: FAMILY MEDICINE | Facility: CLINIC | Age: 54
End: 2024-07-31
Payer: MEDICARE

## 2024-07-31 PROCEDURE — G0511 CCM/BHI BY RHC/FQHC 20MIN MO: HCPCS | Mod: ,,, | Performed by: FAMILY MEDICINE

## 2024-08-14 ENCOUNTER — HOSPITAL ENCOUNTER (OUTPATIENT)
Facility: HOSPITAL | Age: 54
Discharge: HOME | End: 2024-08-14
Attending: ANESTHESIOLOGY | Admitting: ANESTHESIOLOGY
Payer: MEDICARE

## 2024-08-14 ENCOUNTER — ANESTHESIA (OUTPATIENT)
Dept: PAIN MEDICINE | Facility: HOSPITAL | Age: 54
End: 2024-08-14
Payer: MEDICARE

## 2024-08-14 ENCOUNTER — ANESTHESIA EVENT (OUTPATIENT)
Dept: PAIN MEDICINE | Facility: HOSPITAL | Age: 54
End: 2024-08-14
Payer: MEDICARE

## 2024-08-14 VITALS
TEMPERATURE: 98 F | OXYGEN SATURATION: 99 % | WEIGHT: 252 LBS | SYSTOLIC BLOOD PRESSURE: 156 MMHG | BODY MASS INDEX: 30.69 KG/M2 | RESPIRATION RATE: 11 BRPM | HEART RATE: 56 BPM | DIASTOLIC BLOOD PRESSURE: 87 MMHG | HEIGHT: 76 IN

## 2024-08-14 DIAGNOSIS — M47.817 LUMBOSACRAL SPONDYLOSIS WITHOUT MYELOPATHY: ICD-10-CM

## 2024-08-14 PROCEDURE — 25000003 PHARM REV CODE 250: Performed by: ANESTHESIOLOGY

## 2024-08-14 PROCEDURE — 37000008 HC ANESTHESIA 1ST 15 MINUTES: Performed by: ANESTHESIOLOGY

## 2024-08-14 PROCEDURE — 63600175 PHARM REV CODE 636 W HCPCS: Performed by: ANESTHESIOLOGY

## 2024-08-14 PROCEDURE — 63600175 PHARM REV CODE 636 W HCPCS: Performed by: NURSE ANESTHETIST, CERTIFIED REGISTERED

## 2024-08-14 PROCEDURE — 25000003 PHARM REV CODE 250: Performed by: NURSE ANESTHETIST, CERTIFIED REGISTERED

## 2024-08-14 PROCEDURE — 27000716 HC OXISENSOR PROBE, ANY SIZE: Performed by: NURSE ANESTHETIST, CERTIFIED REGISTERED

## 2024-08-14 PROCEDURE — 27096 INJECT SACROILIAC JOINT: CPT | Mod: LT | Performed by: ANESTHESIOLOGY

## 2024-08-14 PROCEDURE — 27000284 HC CANNULA NASAL: Performed by: NURSE ANESTHETIST, CERTIFIED REGISTERED

## 2024-08-14 RX ORDER — BUPIVACAINE HYDROCHLORIDE 2.5 MG/ML
INJECTION, SOLUTION INFILTRATION; PERINEURAL CODE/TRAUMA/SEDATION MEDICATION
Status: DISCONTINUED | OUTPATIENT
Start: 2024-08-14 | End: 2024-08-14 | Stop reason: HOSPADM

## 2024-08-14 RX ORDER — SODIUM CHLORIDE 9 MG/ML
500 INJECTION, SOLUTION INTRAVENOUS CONTINUOUS
Status: DISCONTINUED | OUTPATIENT
Start: 2024-08-14 | End: 2024-08-14 | Stop reason: HOSPADM

## 2024-08-14 RX ORDER — LIDOCAINE HYDROCHLORIDE 20 MG/ML
INJECTION, SOLUTION EPIDURAL; INFILTRATION; INTRACAUDAL; PERINEURAL
Status: DISCONTINUED | OUTPATIENT
Start: 2024-08-14 | End: 2024-08-14

## 2024-08-14 RX ORDER — PROPOFOL 10 MG/ML
VIAL (ML) INTRAVENOUS
Status: DISCONTINUED | OUTPATIENT
Start: 2024-08-14 | End: 2024-08-14

## 2024-08-14 RX ORDER — TRIAMCINOLONE ACETONIDE 40 MG/ML
INJECTION, SUSPENSION INTRA-ARTICULAR; INTRAMUSCULAR CODE/TRAUMA/SEDATION MEDICATION
Status: DISCONTINUED | OUTPATIENT
Start: 2024-08-14 | End: 2024-08-14 | Stop reason: HOSPADM

## 2024-08-14 RX ADMIN — PROPOFOL 100 MG: 10 INJECTION, EMULSION INTRAVENOUS at 08:08

## 2024-08-14 RX ADMIN — LIDOCAINE HYDROCHLORIDE 100 MG: 20 INJECTION, SOLUTION EPIDURAL; INFILTRATION; INTRACAUDAL; PERINEURAL at 08:08

## 2024-08-14 RX ADMIN — SODIUM CHLORIDE: 9 INJECTION, SOLUTION INTRAVENOUS at 08:08

## 2024-08-14 NOTE — TRANSFER OF CARE
"Anesthesia Transfer of Care Note    Patient: Jesse Andrade    Procedure(s) Performed: Procedure(s) (LRB):  BLOCK, SACROILIAC JOINT (Left)    Patient location: Other: [facility]    Anesthesia Type: MAC    Transport from OR: Transported from OR on room air with adequate spontaneous ventilation    Post pain: adequate analgesia    Post assessment: no apparent anesthetic complications    Post vital signs: stable    Level of consciousness: sedated and responds to stimulation    Nausea/Vomiting: no nausea/vomiting    Complications: none    Transfer of care protocol was followedComments: Good SV continue, NAD noted, VSS, RTRN      Last vitals: Visit Vitals  BP (!) 141/83 (BP Location: Right arm, Patient Position: Lying)   Pulse 77   Temp 36.4 °C (97.6 °F) (Oral)   Resp 17   Ht 6' 4" (1.93 m)   Wt 114.3 kg (252 lb)   SpO2 95%   BMI 30.67 kg/m²     "

## 2024-08-14 NOTE — PLAN OF CARE
Plan:  D/c pt via wheelchair at 0920  Informed pt if does not void in 8 hours to go to ER. Notify if redness, drainage, from injection site or fever over next 3-4 days. Rest and drink plenty of fluids for the remainder of the day. No lifting over 5 lbs. For the remainder of the day. Continue regular medications as prescribed. May take pain medications as prescribed.     Pain improved 100%  Pre-procedure pain: 8  Post-procedure pain: 0

## 2024-08-14 NOTE — OP NOTE
08/14/2024  Jesse Duronters 1970    PREOPERATIVE DIAGNOSIS:     Lumbosacral Spondylosis without myelopathy                                                         Disorder of Sacrum                                                         Low Back Pain  POSTOPERATIVE DIAGNOSIS:   Lumbosacral Spondylosis without myelopathy                                                         Disorder of Sacrum                                                         Low Back Pain     PROCEDURE:  Left Sacroiliac Joint Injection under Direct Fluoroscopic Guidance.     SURGEON: Dr. Jaziel Pierson  COMPLICATIONS:  None  DRAINS AND PACKS:  None  ANESTHESIA:   MAC  BLOOD LOSS:  None     The patient was identified in the holding area.  The risks and benefits of the procedure were again explained to the patient.  The patient agreed to proceed. The patients left sacroiliac joint was marked with a skin pen and consent form was signed and obtained.  The patient was taken in stable condition to the operating room and placed in prone position on the C-Arm table.  All pressure points were checked and padded comfortably while the patient was awake.    Anesthesia was initiated.  The patients lower lumbar area overlying the left sacroiliac joint was prepped and draped in the usual sterile fashion.  The C-Arm was then brought into the true AP position to visualize the sacroiliac joint.  Under direct fluoroscopic guidance the C-Arm was oblique to identify the left sacroiliac joint.  The skin wheal was raised with 3 ccs of 0.25 % Bupivacaine(2.5mg/ml) over the targeted area and 22 gauge 31/2inch spinal needles was advanced under direct fluoroscopic guidance into the joint space.  There was negative aspiration of heme and CSF. 1cc  Isovue M 300  was injected intra-articularly to delineate the joint space. A 2.5 ccs allotment of solution that contained [id number]/ml 1ml and 0.25% Bupivacaine(2.5mg/ml)  was injected without complications.   The  patient tolerated the procedure well.  Adequate hemostasis was achieved by holding pressure over the injection sites.  The patient had a band-aid placed over the puncture wound and was taken in stable condition to the holding area and was monitored for the appropriate time of convalescence.      Preoperative pain score was      8/10.   Postoperative pain score was a  /10.   moist mm, drooling.

## 2024-08-14 NOTE — H&P
"[facility] - Pain Management  Pain Management  H&P    Patient Name: Jesse Andrade  MRN: [id number]  Admission Date: 2024  Primary Care Provider: Otilio Vargas MD    Patient information was obtained from     Subjective:     Principal Problem:  [facility]  [address]  [place] [address]  [phone]                   RE: Jesse Andrade      : 1970   Date of Service: 2024   Existing Patient           Chief Complaint:   Neck pain described as aching, constant, dull, sharp; Location bilaterally, at the midline, radiating to shoulder(s); aggravated by activity, poor posture; relieved by narcotics, rest-lying down; onset gradual, constant; reported as 6/10 on pain scale,  Back pain achy in nature, constant, dull; located in the lumbar region, in the midline; aggravated by activity, standing, bending, lifting, walking, weather/temperature change; relieved by analgesics, rest; gradual in occurrence; pain rated as 4/10 on the pain scale,  Knee pain Location right patella; characterized as aching aggravated by walking Timing constant Severity moderate to severe.   Patient seated in room 1 with c/o neck and right hip pain, reports pain is unchanged since last visit  Did not have procedure        [place] Prescription Monitoring Program data was reviewed for this patient for the past 12 calendar months to ascertain any current, or past use of scheduled medications.      History of Present Illness:   What part of the body? neck and right hip   Pain level at best 3; Pain level at worst 6; Pain level at present 7; Pain level on average 6   53 y/o BM with complaints of "low back pain and Right knee pain"; objective data essentially unchanged from previous visit; states that pain improved by 80% after Bilateral L4-L5 RFTC in May 2024 and is still doing good with pain relief; he is now having more pain to Left SI Joint area that is something new; denies any radicular symptoms " or pain in legs; he was able to have his Bilateral C5-C7 MBNB #2 that improved pain by 100% for several days but is now hurting more but defers neck procedures for now; his previous lumbar RFTC was July 2023 that improved by 80% and has lasted for over 9 months; also states that gabapentin helps when he takes it but does not last 12 hrs-we will increase to TID dosing; he had his Pump refill in March; he has driven to [place] for a death in the family; he is having more neck pain and pain to both shoulders; states that pain improved by 75% after Bilateral C3-C5 MBNB #1 and is still doing good with pain relief; he is still doing good with numbness and tingling and has been able to sleep better; his last cervical ARTHUR helped with numbness to LUE but MBNB helped pain to neck; he was able to have his pump refilled and will be due again in March; he is still having more left side neck pain with numbness to fingers; states that pain improved by 80% after Bilateral L4-L5 RFTC and still continues to do well with lower back pain relief; states that pain improved by 85% after cervical ARTHUR but will occasionally have some LUE tingling that lasted for several weeks but is now ready to have an additional injection to help with neck pain without radicular symptoms; his last cervical RF was June 2022; he was able to have his Lumbar RFTC done in Dec but only did the Right side because he took his ASA that morning; he actually had about 80% improvement of pain; pain is only worse with increased movements and first in the morning but improves throughout the day; he has already had his pump refill on 10/05 as well as #2 MBNB that improved by 85% that lasted for a couple of weeks; states that pain improved by 90% that lasted about 2 weeks after Bilateral L2-L5 MBNB #1; he has just recently had his Cervical RFTC in June that has improved his pain and is not having much pain at all to his cervical spine; states that pain improved by 80%  after Left C3-C7 RFTC in Nov 2021; overall, pain has been about the same except for some worsening lower back pain; states that pain improved by 80% after Bilateral C3-C7 MBNB that lasted for about a week; pain does get better with movement but tingling to his fingers has improved; we will consider ARTHUR on return if tingling persists/returns but this has improved since last procedure; pain improved by 85% after Bilateral L3-L5 MBNB in 09/2021 that lasted for several months and is worse now with bending and moving; he was able to have his CT Lspine 01/2021 that revealed some changes but the last injection helped with pain; he was also able to have his pump refilled in Oct and will be due again in April;  states that he has started walking some but causes worsening pain; states that pain improved by 75% after Bilateral C3-C7 MBNB in Dec that lasted for several months but pain is slowly returning and is ready to schedule a neck injection to help with radicular symptoms at this time; denies any radiation of pain into legs and no numbness or tingling to upper or lower extremities; exercise has helped some but pain is still there; states that neck pain has been worse the past several months that wakes him up at night; he is now having more pain that prevents sleeping and from doing every day activity; states that pain improved by 75% after his Bilateral C3-C7 RFTC that lasted for several months; states that pain improved after Bilateral C3-C7 #1 about 80% and then has had a 80% improvement in pain after his Bilateral C3-C7 FI#2 as well as had 50% improvement of ADL's; He has been going to physical therapy after having a R knee replacement in Jan 2019 by Dr. Garcia and has had more pain to knee but Percocet is helping better with that pain as well; he is still exercising some but continues to have pain and swelling; usually ice, rest and meds help with this; he sees Dr. Garcia again in July 2020 but feels like it is not  healing as well as it should; he has been going to the gym to help with pain and strengthening; states he has been doing well and is better today since last visit after having his last injection; He describes the pain as dull pain that is constant; he has also started taking testosterone injections at home but will have an appt with Dr. Graves hopefully in Aug; denies any issues with constipation; He has been experiencing some tingling and numbness to his R thigh; he started Neurontin 300mg BID since last visit and states it is helping with the numbness and tingling sensation and request to continue with 600 mg daily; also states that the Flexeril has helped better with muscle spasms; he is due for his next pump refill in Aug      UDS: consistent x 21; inconsistent x 3 (no percocet, +Norco that he had left over from the past but instructed that this can not happen again)   The previous urine drug screen was evaluated, and it was compliant for the medications that has been prescribed. A presumptive urine drug screen was done today to rapidly obtain and integrate results into clinical assessment and decision-making for ongoing safe prescribing of controlled substances. The results of the presumptive UDS done today was positive for opiates. He is prescribed oxycodone. Because presumptive UDS positive results are not definitive due to sensitivity and specificity and cross reactivity limitations and negative results do not necessarily indicate absence of drugs or substances in the urine specimen, confirmation will identify specific prescribed and non-prescribed medications or illicit use for ongoing safe prescribing of controlled substances including benzodiazepines, opioid agonist, opioids antagonist, partial agonist, stimulants, muscle relaxers, antidepressants, sleep aids, anti-seizure medicine, and alcohol. Urine drug analysis is used to assist with diagnosis and therapeutic decision-making concerning pretreatment  assessment. Intensity and frequency of monitoring with urine drug testing will be based on the risk stratification method in determining risk level for opioid addiction.     Meds: Percocet --due 08/06/2024; requests refills on meds and states that meds help with his pain; no misuse of meds and no opioid abuse;  reviewed and is appropriate; he is currently prescribed 30 mg of morphine equivalent meds/day      Nursing:   Pain Medication/Dose/Last Taken/# Taken  ms pump  oxycodone  pain level with medication is  4/10 and without is a 8/10     Is it helping? Yes  Physical Therapy  no Home Exercises  Is it helping? Yes     no New medical problems or surgeries  no New medications  no New allergies  no New antibiotics, fever, infection      Allergies:   Allergies Reviewed - 07/31/24 9:56:46 AM CST  Keflex       Current Medications:   Medications List Reviewed (07/31/24 9:56:42 AM CST)  Ketorolac Tromethamine Injection Solution 60 MG/2ML (11/2/2023) From 11/02/2023 11:15 AM to 11/02/2023 11:30 AM  dexAMETHasone Sodium Phosphate Injection Solution 4 MG/ML (11/2/2023) From 11/02/2023 11:15 AM to 11/02/2023 11:30 AM  Diclofenac Sodium External Gel 1 % (5/1/2024) Apply 2 gram applications three times a day for 30 day(s)  Cyclobenzaprine HCl Oral Tablet 10 MG (5/1/2024) Take 1 tablet twice a day as needed for 30 day(s)  Gabapentin Oral Tablet 600 MG (6/19/2024) Take 1 tablet three times a day for 30 day(s)  Vitamin D3 Oral Capsule 1.25 MG (53914 UT) (5/1/2024) Take 1 capsule once a week for 4 week(s)  Percocet Oral Tablet  MG (7/31/2024) Take 1 tablet twice a day as needed for 30 day(s)  Testosterone Cypionate Intramuscular Solution 200 MG/ML (11/7/2023) Inject 1/2 milliliter IM every 72 hours  Morphine Sulfate Intramuscular Device 10 MG/0.7ML (1/12/2017) Morphine Pain Pump  Aspirin Oral Tablet Delayed Release 325 MG (1/12/2017) Take 1 tablet twice a week for 30 week(s)  Atorvastatin Calcium Oral Tablet 10 MG  "(2017) Take 1 tablet once a day for 30 day(s)      Previous Studies:  CT SCAN  Final Report  CT CTIC SPINE LUMBAR W/O CONTRAST    Show Printer-Friendly Version with Images (4 of 5)  Show Printer-Friendly Version without images Patient Name: Jesse Andrade   : Mar-   ID: [id number]([facility])   Study Date:  08:03             Studies- CTIC SPINE LUMBAR W/O CONTRAST Indications- Lumbar radiculopathy. Loose leads on pain pump Comparison- None. Technique- CT of the lumbar spine was performed without the administration of intravenous contrast. Axial, sagittal, and coronal series were submitted for interpretation. Findings- Alignment of the lumbar vertebral bodies is normal. Intervertebral disc spaces as well as vertebral body heights are well maintained throughout the lumbar spine. Facet joints have normal anatomic relationships and minimal degenerative change. No acute fractures are demonstrated. The imaged intra-abdominal and intrapelvic contents demonstrate no evidence of acute pathology. Visualized portion of the "lead" demonstrates no evidence of discontinuity. Multiple enlarged lymph nodes are demonstrated within the ileocolic mesentery. These measure 7 mm in short axis dimension. Additional enlarged lymph nodes in the paracaval region measuring up to 6-7 mm. Borderline bilateral intrapelvic lymph nodes measuring up to 8 mm short axis dimension are demonstrated. Conclusion- 1. Multiple intrapelvic and mesenteric lymph nodes are demonstrated which are borderline in size to minimally enlarged. When compared to additional imaging of the abdomen and pelvis performed 2014, these lymph nodes appear to have minimally increased in size as well as number. Significance of these lymph nodes and relevant to patient's back pain is uncertain. Correlation recommended. 2. No significant abnormality of the lumbar spine is demonstrated. 3. No break in continuity of the demonstrated lead is present. " 2016 8-30 AM Ordering physician-SALINAS SINGH MD Point of service- [facility]. This report has been electronically signed by Ryley Kaplan Radiologist- RYLEY ALTMAN II, M.D. Releasing RadiologistKelly ALTMAN II, M.D. Released Date Time- 16 0845 ------------------------------------------------------------------------------     Signed by: Serena Weiner    Signed on:  08:30    [facility]  Impression:  1. no acute fracture or subluxation within the lumber spine  2. Mild degenerative disc disease and facet/unconvertebral arthropathy at L5/S1. Moderate symmetric bilateral neuroforaminal stenoses at L5/S1  3. Very mild to mild midline broad-based posterior intervertebral disc bulges from L3/L4-L5/S1 without high grade spinal canal stenosis    CT [facility]spine at [facility]  2023  Impression:  multilevel degenerative changes throughout the cervical spine; previous C5-C7 ACDF         ; X-RAY  Final Report  CR XR RIBS LEFT WITH PA/AP CHEST    Show Printer-Friendly Version Patient Name: Jesse Andrade   : Mar-   ID: [id number]([facility])   Study Date:  13:59             AP chest with left rib detail. Indication- Fall, with left chest wall pain. The heart size is normal. There is elevation of the right hemidiaphragm, stable finding. Surgical clips in the right upper quadrant. Postsurgical changes in the cervical spine. No pneumothorax. No pleural effusion. No rib fracture is seen. Impression- No acute abnormality. Place of service- [facility] This report has been electronically signed by Mariah Kaplan Physician- MARIAH Blanc PhysicianKelly TIERNEY M.D. Released Date Time- 18 1419 ------------------------------------------------------------------------------     Signed by: Serena Weiner    Signed on:  14:13  Final Report  [facility] SPINE CERVICAL  AP AND LATERAL  Show Printer-Friendly Version Patient Name: Jesse Andarde    : Mar-    ID: [id number]([facility])    Study Date: Mar- 12:39       CR SPINE CERVICAL AP AND LATERAL    Indication- Cervicalgia    Comparison- Cervical spine x-ray Shirley 15, 2005    Technique- Frontal and lateral views of the cervical spine.    Findings-     There is straightening of normal cervical lordosis which may be  positional or secondary muscle spasm. There is no significant  anterolisthesis or retrolisthesis.  Anterior cervical fusion hardware at  C5-C7 with osseous fusion. Mild marginal osteophyte formation noted at  C3-4 and C4-5. The C7-T1 level is not well visualized on lateral view.  No gross evidence of significant vertebral body height loss.    IMPRESSION-    Degenerative change and malalignment of the cervical spine as detailed  above.Anterior cervical fusion hardware at C5-C7 with osseous fusion.       Point of Service- [facility]    This report has been electronically signed by Santhosh Lemus          -  MARYBEL Kaplan Physician- SANTHOSH LEMUS D.O.       Releasing Physician- SANTHOSH LEMUS D.O.       Released Date Time- 19 1409   ------------------------------------------------------------------------------    Signed by: Serena Weiner Signed on: Mar- 13:58  ; Findings  LAB results from Dr. Daley:  2018  Vitamin D  10.8  Aug 2018  PSA  0.989  Testosterone 255  Total Testosterone 226  Free Testosterone 4.75     C spine X-ray 2019      Past Medical History:   The patient has a past medical history of  Hypertension, High Cholesterol, Osteoarthritis (OA), Joint Pain.  There is no past medical history of  Cardiac Pacer, Diabetes Mellitus type 1, Diabetes Mellitus type 2, Chronic Obstructive Pulmonary Disease, Rheumatoid Arthritis, Gastroesophageal Reflux Disease, Cerebrovascular Accident (CVA), Cardiovascular Disease, Alcoholism, Crohn's disease, Terminal  Illness.   severe dementianutritional quality good      Social History:      Smoking Status: Light tobacco smoker; Last Reviewed: 2024            Pack-years: 1         Date quit smoking: 10 years ago      Alcohol use: Non-Drinker  Racial background:   Occupation: disabled  Marital status:   Patient knows the purpose/use of medications  Patient is taking medications as prescribed               Family History:   Father  History remarkable for  gun shot.   Age 22;       Mother  History remarkable for  Coronary Artery Disease.   Age 42;       Review of Systems:   General:  Patient denies  sweats, fatigue, fever, chills.  Ears, Nose and Throat:  Patient denies  hearing loss, ringing in the ears.  Cardiovascular:  Patient denies  chest pain.  Respiratory:  Patient denies  shortness of breath.  Gastrointestinal:  Patient denies  nausea, vomiting, diarrhea, constipation.  Genitourinary:  Patient denies  urinary frequency, prostate problems.  Endocrine:  Patient denies  thyroid problems.  Hematologic:  Patient denies  bleeding tendencies, easy bruising tendency.  Musculoskeletal:  Patient denies  joint pain, walking aids.  Neurologic  Patient denies  seizures, headache.  Psychologic:  Patient denies  anxiety, panic attacks, depression.  Skin:  Patient denies  skin rash.       DEPRESSION SCREENING:   Not at all the patient reports little interest or pleasure in doing things.  Not at all the patient reports feeling down, depressed, or hopeless.  Date Depression Screening Last Done: 2020   PHQ-2 Score 0; PHQ-9 Score incomplete   Several days the patient reports little interest or pleasure in doing things.  Several days the patient reports feeling down, depressed, or hopeless.  Date Depression Screening Last Done: 2019      Vital Signs:   Weight 256 lbs; Height 6 ft 4 in; BMI 31.2   2024 9:44 AM (CST)  Temperature 98.6 °F   2024 9:46 AM (CST)  Respiration Rate  "18; Pulse Rate 72 bpm; Blood Pressure 140 / 94 mm/Hg; Pain Level: 8         Physical Examination:   Pre Anesthesia evaluation  Pre Op dx: lumbosacral spondylosis, disorder of sacrum  Planned procedure: Left SI joint injection   Age: 54  Ht: 6'4"  Wt: 237 lbs  BMI: 28.9  Allergies: Keflex  Meds/Labs/Test  Prior Surgeries:  Anesthesia complications: none known     Medical History  CNS: _X_Neg.   __Seizures  __CVA  __TIA  __HA  __Depression  Cardiac: __Neg  __CAD  __Stents  __MI  _X_HTN  __CHF  Pulmonary: __Neg  __COPD  __Asthma  __Sleep Apnea  __Smoker PPD  __CPAP  GI:_X_Neg.  __Reflux  __Liver Dysfunction  __Hepatitis  __Hiatal Hernia  __Hepatitis  __ETOH  __GERD   Renal:  _X_Neg.  __CRI  __ESRD  Endocrine:  _X_Neg.  __Thyroid  __Diabetes  Heme:  _X_Neg.   __Blood thinners  __other     Cranial Nerves II-XII grossly intact.  No apparent distress.  Patient is alert and oriented times three.  No somnolence or slurred speech.     Lumbar spine has pain with flexion and extension lateral rotation  Lumbar facets are tender to palpation  No focal neurologic deficits noted  physical exam essentially unchanged from previous   Back Motion:   Lumbar / lumbosacral spine abnormal.      Tenderness on Palpation:   Lumbosacral Spine:  There is tenderness on palpation of the  left sciatic notch moderate to severe in nature.      Other Examinations:   Left jethro-fabere test positive.   Gaenslen's Left Positive         Additional Physical Findings:  General general appearance normal appears comfortable,   Oriented to time, place and person, pleasant, seated  Not uncomfortable  Head normal head exam  Eyes normal eye exam  Chest normal chest exam  Respiratory normal respiratory exam  Musculoskeletal abnormal low back pain and knee pain,   Joint tenderness  Posture normal  Neurologic normal neurologic exam  Skin normal skin exam       Toxicology Report   Toxicology was performed.   Reason for Toxicology:  A presumptive urine drug screen " was done today to rapidly obtain and integrate results into clinical assessment and decision-making for ongoing safe prescribing of controlled substances.   Test Date/Time: 07/31/2024 00:00   Tested By: KONSTANTIN   Oxycodone  (OXY): Result = Positive; Control = Positive   Morphine  (OPI): Result = Positive; Control = Positive   Amphetamines  (AMP): Result = Negative; Control = Positive   Oxazepam  (BZO): Result = Negative; Control = Positive   Methadone  (MTD): Result = Negative; Control = Positive   Secobarbital  (BAR): Result = Negative; Control = Positive   Tricyclic Antidepressants  (TCA): Result = Negative; Control = Positive   Nortriptyline  (TCA): Result = Negative; Control = Positive   Marijuana-Carboxy Tetrahydrocannabinoid   (THC): Result = Negative; Control = Positive   Cocaine  (MEDARDO): Result = Negative; Control = Positive   Ecstasy-Methylenedioxymethamphetamine  (MDMA): Result = Negative; Control = Positive   D Methamphetamine  (MET): Result = Negative; Control = Positive   Phencyclidine  (PCP): Result = Negative; Control = Positive   Adulterants  (OX, SG, pH): Result = Negative; Control = Positive      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.1) - Testicular hypofunction  (M54.16) - Lumbar radiculopathy  (M47.812) - Cervical spondylosis without myelopathy  (M47.817) - Lumbosacral spondylosis  (M54.12) - Cervical radiculopathy  (M54.2) - Neck pain  (G89.4) - Chronic pain syndrome  (M53.3) - Disorder of sacrum      Plan:   Follow up visit 6 weeks      -refilled Percocet -- due 08/06/2024 (original hold date)  -gave rx for Sept with hold date 09/05/2024  -scheduled Left SI joint injection at [facility] on 08/14/2024 at 7:30 am   -increased Gabapentin to TID dosing (due again in Nov)   -refilled Testosterone with 2 RF (does not need refills)   -we have held Testosterone due to abnormal blood work and pt has an appt with Dr. Graves for sometime in Aug    -Sent rx for Flexeril 10 mg TID, Gabapentin 600 mg TID  and Voltaren gel to [facility] with 5 RF (due again in Nov)   -drink plenty of fluids and water  -increase fiber in diet  -call sooner with worsening pain  -pump refill was done on 03/06/2024  -next alarm date will be 08/30/2024  -scheduled Pump refill on 08/28/2024 at [facility] at 8:30 am   -refill Vit D 51556 unit weekly to [facility] with 5 RF  -will did receive cardiac clearance from Dr. Arteaga          Monitored Anesthesia Care medical necessity authorization request:   Monitor anesthesia request is medically indicated for the scheduled _SI joint injection_______procedure due to:     - needle phobia and anxiety, placing the patient at risk during the provided service._YES____  - patient has a BMI greater than 45 ____  - patient has severe sleep apnea for which BiPAP and oxygen are needed while sleeping._____  - patient is unable to follow simple commands due to mental state.____  - patient has an ASA class greater than 3 and requires constant presence of an anesthesiologist/CRNA during the procedure.____  - patient has severe problems with muscles and muscle spasticity that makes it hard to lie still. ____  - patient suffers from chronic pain and is unable to function due to diminished ADL's._YES___  - patient is dependent on opioids or sedatives. _YES___   - Other __YES__        Patient will be scheduled for SI joint injection(s). We have discussed the need for two diagnostically sound procedures before the radiofrequency ablation of the dorsal root rami can be scheduled.        Indications for this procedure for this specific patient include the following:   - Pt has had symptoms for three months with moderate to severe pain with functional impairment rated of  /10 pain.   - Pain non-responsive to conservative care.  - Pain is not associated with radiculopathy or claudication.  - No non-SI joint pathology as source of pain.  - Clinical assessment implicates SI  joints as putative pain source.   - Pain is exacerbated by extension or prolonged sitting/standing and relieved by rest.   - No unexplained neurologic deficit.   - No history of coagulopathy , infection or unstable medical conditions.  - Pain is causing significant functional limitation resulting in diminished quality of life and impaired age appropriate ADL's.  - Repeat injections not done prior to 7 days.        NSAIDS Failure__YES___  Pain for 3months or >_YES____  Pain level 6> intermittent or continuous__YES__  Physical exam with documented signs that SI are the primary source of pain__YES__           NARCOTIC STATEMENT  Patient is taking the narcotic pain medications as prescribed. Refill is being given because of the benefit to the patient in regards to the pain. Patient has agreed not to abuse of medication and not to take it more than what is prescribed. The nature of the drug including the potential for addiction and dependency and abuse was also discussed with the patient. Patient has developed physical dependency for the narcotic pain medication for his pain relief.  Patient has also developed tolerance to the sedative effect of the narcotic pain medications.  Patient has decided to continue with these medications despite potential for addiction as described by this office.  This was stressed to the patient that it is the patient's responsibility to secure the narcotic medication and in any event of loss for any reason whatsoever,  there will be no refill before the next due date. Patient also understands that they are not supposed to drive or work on machinery while taking these medications.  Also explained to the patient that in the event of traffic citation, the presence of this drug in  bloodstream may result in DUI.  Patient has been advised not to drink alcohol while taking this medication.  Patient has verbalized understanding of our office policy and has signed a contract with us in this  regard.                  Prescriptions Written Today:  Percocet Oral Tablet  MG  Take 1 tablet twice a day as needed for 30 day(s)  Refills: No Refills  Rx quantity: 60  Take 1 tablet twice a day as needed for 30 day(s)  Refills: No Refills  Rx quantity: 60  Take 1 tablet four times a day as needed for 30 day(s)  Refills: No Refills  Rx quantity: 120  Take 1 tablet twice a day as needed for 30 day(s)  Refills: No Refills  Rx quantity: 60                 Mary Torres       Electronically signed: 7/31/2024 1:13:00 PM      Jaziel Pierson MD      Electronically signed: 8/2/2024 9:58:04 AM       Chief Complaint:      HPI:       Assessment/Plan:         Jaziel Pierson MD  Pain Management  [facility] - Pain Management

## 2024-08-14 NOTE — ANESTHESIA PREPROCEDURE EVALUATION
08/14/2024  Jesse Andrade is a 54 y.o., male.      Pre-op Assessment    I have reviewed the Patient Summary Reports.     I have reviewed the Nursing Notes. I have reviewed the NPO Status.   I have reviewed the Medications.     Review of Systems  Anesthesia Hx:  No problems with previous Anesthesia                Social:  Former Smoker, No Alcohol Use       Cardiovascular:     Hypertension, well controlled           hyperlipidemia    Non-ischemic cardiomyopathy                         Pulmonary:        Sleep Apnea, CPAP                Musculoskeletal:  Arthritis               Neurological:        Chronic Pain Syndrome                             Physical Exam  General: Well nourished, Cooperative, Alert and Oriented    Airway:  Mallampati: III   Mouth Opening: Normal  TM Distance: 4 - 6 cm  Tongue: Large  Neck ROM: Normal ROM    Dental:  Intact        Anesthesia Plan  Type of Anesthesia, risks & benefits discussed:    Anesthesia Type: MAC  Intra-op Monitoring Plan: Standard ASA Monitors  Post Op Pain Control Plan: multimodal analgesia and IV/PO Opioids PRN  Induction:  IV  Informed Consent: Informed consent signed with the Patient and all parties understand the risks and agree with anesthesia plan.  All questions answered. Patient consented to blood products? Yes  ASA Score: 3  Day of Surgery Review of History & Physical: I have interviewed and examined the patient. I have reviewed the patient's H&P dated: There are no significant changes.     Ready For Surgery From Anesthesia Perspective.     .  Past Medical History:   Diagnosis Date    Arthritis     Hyperlipidemia     Hypertension     Sleep apnea        Past Surgical History:   Procedure Laterality Date    CERVICAL SPINE SURGERY      fusion    EPIDURAL STEROID INJECTION INTO CERVICAL SPINE Right 6/7/2023    Procedure: INJECTION, STEROID, SPINE, CERVICAL,  EPIDURAL;  Surgeon: Jaziel Pierson MD;  Location: [facility];  Service: Pain Management;  Laterality: Right;  C6-7 cath guided ARTHUR with right bias    EPIDURAL STEROID INJECTION INTO CERVICAL SPINE Left 10/25/2023    Procedure: INJECTION, STEROID, SPINE, CERVICAL, EPIDURAL;  Surgeon: Jaziel Pierson MD;  Location: [facility];  Service: Pain Management;  Laterality: Left;  Left C6-7 cath guided ARTUHR    FACIAL FRACTURE SURGERY      INJECTION OF ANESTHETIC AGENT AROUND MEDIAL BRANCH NERVES INNERVATING CERVICAL FACET JOINT Bilateral 12/20/2023    Procedure: BLOCK, NERVE, FACET JOINT, CERVICAL, MEDIAL BRANCH;  Surgeon: Jaziel Pierson MD;  Location: [facility];  Service: Pain Management;  Laterality: Bilateral;    INJECTION OF ANESTHETIC AGENT AROUND MEDIAL BRANCH NERVES INNERVATING CERVICAL FACET JOINT Bilateral 4/17/2024    Procedure: BLOCK, NERVE, FACET JOINT, CERVICAL, MEDIAL BRANCH;  Surgeon: Jaziel Pierson MD;  Location: [facility];  Service: Pain Management;  Laterality: Bilateral;  Torey C5-7 MBNB    INJECTION OF ANESTHETIC AGENT AROUND MEDIAL BRANCH NERVES INNERVATING LUMBAR FACET JOINT Bilateral 10/26/2022    Procedure: BLOCK, NERVE, FACET JOINT, LUMBAR, MEDIAL BRANCH;  Surgeon: Jaziel Pierson MD;  Location: [facility];  Service: Pain Management;  Laterality: Bilateral;  Bilateral L2-5 MBNB    RADIOFREQUENCY ABLATION OF LUMBAR MEDIAL BRANCH NERVE AT SINGLE LEVEL Right 12/14/2022    Procedure: RADIOFREQUENCY ABLATION, NERVE, SPINAL, LUMBAR, MEDIAL BRANCH, 1 LEVEL;  Surgeon: Jaziel Pierson MD;  Location: [facility];  Service: Pain Management;  Laterality: Right;  Right L3-5 RFTC    RADIOFREQUENCY ABLATION OF LUMBAR MEDIAL BRANCH NERVE AT SINGLE LEVEL Bilateral 7/26/2023    Procedure: RADIOFREQUENCY ABLATION, NERVE, SPINAL, LUMBAR, MEDIAL BRANCH, 1 LEVEL;  Surgeon: Jaziel Pierson MD;  Location: [facility];  Service: Pain  Management;  Laterality: Bilateral;  Bilateral L3-5 RFTC    RADIOFREQUENCY ABLATION OF LUMBAR MEDIAL BRANCH NERVE AT SINGLE LEVEL Bilateral 5/22/2024    Procedure: RADIOFREQUENCY ABLATION, NERVE, SPINAL, LUMBAR, MEDIAL BRANCH, 1 LEVEL;  Surgeon: Jaziel Pierson MD;  Location: [facility];  Service: Pain Management;  Laterality: Bilateral;  Torey L3-5 RFTC    REFILL PAIN PUMP N/A 4/14/2021    Procedure: REFILLING, ANALGESIC PUMP;  Surgeon: Jaziel Pierson MD;  Location: [facility];  Service: Pain Management;  Laterality: N/A;  pump refill    REFILL PAIN PUMP N/A 10/27/2021    Procedure: REFILLING, ANALGESIC PUMP;  Surgeon: Jaziel Pierson MD;  Location: [facility];  Service: Pain Management;  Laterality: N/A;  Pump refill    REFILL PAIN PUMP N/A 4/13/2022    Procedure: REFILLING, ANALGESIC PUMP;  Surgeon: Jaziel Pierson MD;  Location: [facility];  Service: Pain Management;  Laterality: N/A;  Pump refill    REFILL PAIN PUMP N/A 10/5/2022    Procedure: REFILLING, ANALGESIC PUMP;  Surgeon: Jaziel Pierson MD;  Location: [facility];  Service: Pain Management;  Laterality: N/A;  Pump refill    REFILL PAIN PUMP N/A 3/29/2023    Procedure: REFILLING, ANALGESIC PUMP;  Surgeon: Jaziel Pierson MD;  Location: [facility];  Service: Pain Management;  Laterality: N/A;  Pump refill    REFILL PAIN PUMP N/A 9/20/2023    Procedure: REFILLING, ANALGESIC PUMP;  Surgeon: Jaziel Pierson MD;  Location: [facility];  Service: Pain Management;  Laterality: N/A;  pump refill    REFILL PAIN PUMP N/A 3/6/2024    Procedure: REFILLING, ANALGESIC PUMP;  Surgeon: Jaziel Pierson MD;  Location: [facility];  Service: Pain Management;  Laterality: N/A;  Pump refill    TOTAL KNEE ARTHROPLASTY Bilateral        Family History   Problem Relation Name Age of Onset    Coronary artery disease Mother         Social History     Socioeconomic History     Marital status:    Tobacco Use    Smoking status: Former     Current packs/day: 0.75     Average packs/day: 0.8 packs/day for 10.0 years (7.5 ttl pk-yrs)     Types: Cigarettes     Passive exposure: Past    Smokeless tobacco: Current     Types: Snuff   Substance and Sexual Activity    Alcohol use: Never       Current Facility-Administered Medications   Medication Dose Route Frequency Provider Last Rate Last Admin    0.9%  NaCl infusion  500 mL Intravenous Continuous Jaziel Pierson MD           Review of patient's allergies indicates:   Allergen Reactions    Keflex [cephalexin]       Patient Active Problem List   Diagnosis    Chronic pain syndrome    Non-ischemic cardiomyopathy    Hypertension    Hyperlipidemia

## 2024-08-14 NOTE — DISCHARGE SUMMARY
Patient underwent  Left Sacroiliac Joint Injection under Direct Fluoroscopic Guidance procedure 08/14/2024. The pt will follow up in clinic. Discharged home. Discharge Dx: Lumbosacral Spondylosis without myelopathy.

## 2024-08-14 NOTE — ANESTHESIA POSTPROCEDURE EVALUATION
Anesthesia Post Evaluation    Patient: Jesse Andrade    Procedure(s) Performed: Procedure(s) (LRB):  BLOCK, SACROILIAC JOINT (Left)    Final Anesthesia Type: general      Patient location: [facility].  Patient participation: Yes- Able to Participate  Level of consciousness: awake and alert  Post-procedure vital signs: reviewed and stable  Pain management: adequate  Airway patency: patent    PONV status at discharge: No PONV  Anesthetic complications: no      Cardiovascular status: blood pressure returned to baseline, hemodynamically stable and stable  Respiratory status: unassisted  Hydration status: euvolemic  Follow-up not needed.  Comments: Pt voices appreciation for care              Vitals Value Taken Time   /87 08/14/24 0915   Temp 97.9 08/14/24 1228   Pulse 56 08/14/24 0915   Resp 11 08/14/24 0915   SpO2 99 % 08/14/24 0915         Event Time   Out of Recovery 09:15:00         Pain/Alexi Score: Alexi Score: 10 (8/14/2024  9:15 AM)

## 2024-08-28 ENCOUNTER — HOSPITAL ENCOUNTER (OUTPATIENT)
Facility: HOSPITAL | Age: 54
Discharge: HOME | End: 2024-08-28
Attending: ANESTHESIOLOGY | Admitting: ANESTHESIOLOGY
Payer: MEDICARE

## 2024-08-28 VITALS
SYSTOLIC BLOOD PRESSURE: 134 MMHG | DIASTOLIC BLOOD PRESSURE: 83 MMHG | OXYGEN SATURATION: 99 % | HEART RATE: 57 BPM | WEIGHT: 241 LBS | RESPIRATION RATE: 16 BRPM | HEIGHT: 76 IN | BODY MASS INDEX: 29.35 KG/M2

## 2024-08-28 DIAGNOSIS — G89.4 CHRONIC PAIN SYNDROME: ICD-10-CM

## 2024-08-28 PROCEDURE — 62370 ANL SP INF PMP W/MDREPRG&FIL: CPT | Performed by: ANESTHESIOLOGY

## 2024-08-28 PROCEDURE — 63600175 PHARM REV CODE 636 W HCPCS: Performed by: ANESTHESIOLOGY

## 2024-08-28 RX ADMIN — MORPHINE SULFATE: 10 INJECTION, SOLUTION EPIDURAL; INTRATHECAL at 09:08

## 2024-08-28 NOTE — H&P
"[facility] - Pain Management  Pain Management  H&P    Patient Name: Jesse Andrade  MRN: [id number]  Admission Date: 2024  Primary Care Provider: Otilio Vargas MD    Patient information was obtained from     Subjective:     Principal Problem:  [facility]  [address]  [place] [address]  [phone]                   RE: Jesse Andrade      : 1970   Date of Service: 2024   Existing Patient           Chief Complaint:   Neck pain described as aching, constant, dull, sharp; Location bilaterally, at the midline, radiating to shoulder(s); aggravated by activity, poor posture; relieved by narcotics, rest-lying down; onset gradual, constant; reported as 6/10 on pain scale,  Back pain achy in nature, constant, dull; located in the lumbar region, in the midline; aggravated by activity, standing, bending, lifting, walking, weather/temperature change; relieved by analgesics, rest; gradual in occurrence; pain rated as 4/10 on the pain scale,  Knee pain Location right patella; characterized as aching aggravated by walking Timing constant Severity moderate to severe.   Patient seated in room 1 with c/o neck and right hip pain, reports pain is unchanged since last visit  Did not have procedure        [place] Prescription Monitoring Program data was reviewed for this patient for the past 12 calendar months to ascertain any current, or past use of scheduled medications.      History of Present Illness:   What part of the body? neck and right hip   Pain level at best 3; Pain level at worst 6; Pain level at present 7; Pain level on average 6   53 y/o BM with complaints of "low back pain and Right knee pain"; objective data essentially unchanged from previous visit; states that pain improved by 80% after Bilateral L4-L5 RFTC in May 2024 and is still doing good with pain relief; he is now having more pain to Left SI Joint area that is something new; denies any radicular symptoms " or pain in legs; he was able to have his Bilateral C5-C7 MBNB #2 that improved pain by 100% for several days but is now hurting more but defers neck procedures for now; his previous lumbar RFTC was July 2023 that improved by 80% and has lasted for over 9 months; also states that gabapentin helps when he takes it but does not last 12 hrs-we will increase to TID dosing; he had his Pump refill in March; he has driven to [place] for a death in the family; he is having more neck pain and pain to both shoulders; states that pain improved by 75% after Bilateral C3-C5 MBNB #1 and is still doing good with pain relief; he is still doing good with numbness and tingling and has been able to sleep better; his last cervical ARTHUR helped with numbness to LUE but MBNB helped pain to neck; he was able to have his pump refilled and will be due again in March; he is still having more left side neck pain with numbness to fingers; states that pain improved by 80% after Bilateral L4-L5 RFTC and still continues to do well with lower back pain relief; states that pain improved by 85% after cervical ARTHUR but will occasionally have some LUE tingling that lasted for several weeks but is now ready to have an additional injection to help with neck pain without radicular symptoms; his last cervical RF was June 2022; he was able to have his Lumbar RFTC done in Dec but only did the Right side because he took his ASA that morning; he actually had about 80% improvement of pain; pain is only worse with increased movements and first in the morning but improves throughout the day; he has already had his pump refill on 10/05 as well as #2 MBNB that improved by 85% that lasted for a couple of weeks; states that pain improved by 90% that lasted about 2 weeks after Bilateral L2-L5 MBNB #1; he has just recently had his Cervical RFTC in June that has improved his pain and is not having much pain at all to his cervical spine; states that pain improved by 80%  after Left C3-C7 RFTC in Nov 2021; overall, pain has been about the same except for some worsening lower back pain; states that pain improved by 80% after Bilateral C3-C7 MBNB that lasted for about a week; pain does get better with movement but tingling to his fingers has improved; we will consider ARTHUR on return if tingling persists/returns but this has improved since last procedure; pain improved by 85% after Bilateral L3-L5 MBNB in 09/2021 that lasted for several months and is worse now with bending and moving; he was able to have his CT Lspine 01/2021 that revealed some changes but the last injection helped with pain; he was also able to have his pump refilled in Oct and will be due again in April;  states that he has started walking some but causes worsening pain; states that pain improved by 75% after Bilateral C3-C7 MBNB in Dec that lasted for several months but pain is slowly returning and is ready to schedule a neck injection to help with radicular symptoms at this time; denies any radiation of pain into legs and no numbness or tingling to upper or lower extremities; exercise has helped some but pain is still there; states that neck pain has been worse the past several months that wakes him up at night; he is now having more pain that prevents sleeping and from doing every day activity; states that pain improved by 75% after his Bilateral C3-C7 RFTC that lasted for several months; states that pain improved after Bilateral C3-C7 #1 about 80% and then has had a 80% improvement in pain after his Bilateral C3-C7 FI#2 as well as had 50% improvement of ADL's; He has been going to physical therapy after having a R knee replacement in Jan 2019 by Dr. Garcia and has had more pain to knee but Percocet is helping better with that pain as well; he is still exercising some but continues to have pain and swelling; usually ice, rest and meds help with this; he sees Dr. Garcia again in July 2020 but feels like it is not  healing as well as it should; he has been going to the gym to help with pain and strengthening; states he has been doing well and is better today since last visit after having his last injection; He describes the pain as dull pain that is constant; he has also started taking testosterone injections at home but will have an appt with Dr. Graves hopefully in Aug; denies any issues with constipation; He has been experiencing some tingling and numbness to his R thigh; he started Neurontin 300mg BID since last visit and states it is helping with the numbness and tingling sensation and request to continue with 600 mg daily; also states that the Flexeril has helped better with muscle spasms; he is due for his next pump refill in Aug      UDS: consistent x 21; inconsistent x 3 (no percocet, +Norco that he had left over from the past but instructed that this can not happen again)   The previous urine drug screen was evaluated, and it was compliant for the medications that has been prescribed. A presumptive urine drug screen was done today to rapidly obtain and integrate results into clinical assessment and decision-making for ongoing safe prescribing of controlled substances. The results of the presumptive UDS done today was positive for opiates. He is prescribed oxycodone. Because presumptive UDS positive results are not definitive due to sensitivity and specificity and cross reactivity limitations and negative results do not necessarily indicate absence of drugs or substances in the urine specimen, confirmation will identify specific prescribed and non-prescribed medications or illicit use for ongoing safe prescribing of controlled substances including benzodiazepines, opioid agonist, opioids antagonist, partial agonist, stimulants, muscle relaxers, antidepressants, sleep aids, anti-seizure medicine, and alcohol. Urine drug analysis is used to assist with diagnosis and therapeutic decision-making concerning pretreatment  assessment. Intensity and frequency of monitoring with urine drug testing will be based on the risk stratification method in determining risk level for opioid addiction.     Meds: Percocet --due 08/06/2024; requests refills on meds and states that meds help with his pain; no misuse of meds and no opioid abuse;  reviewed and is appropriate; he is currently prescribed 30 mg of morphine equivalent meds/day      Nursing:   Pain Medication/Dose/Last Taken/# Taken  ms pump  oxycodone  pain level with medication is  4/10 and without is a 8/10     Is it helping? Yes  Physical Therapy  no Home Exercises  Is it helping? Yes     no New medical problems or surgeries  no New medications  no New allergies  no New antibiotics, fever, infection      Allergies:   Allergies Reviewed - 07/31/24 9:56:46 AM CST  Keflex       Current Medications:   Medications List Reviewed (07/31/24 9:56:42 AM CST)  Ketorolac Tromethamine Injection Solution 60 MG/2ML (11/2/2023) From 11/02/2023 11:15 AM to 11/02/2023 11:30 AM  dexAMETHasone Sodium Phosphate Injection Solution 4 MG/ML (11/2/2023) From 11/02/2023 11:15 AM to 11/02/2023 11:30 AM  Diclofenac Sodium External Gel 1 % (5/1/2024) Apply 2 gram applications three times a day for 30 day(s)  Cyclobenzaprine HCl Oral Tablet 10 MG (5/1/2024) Take 1 tablet twice a day as needed for 30 day(s)  Gabapentin Oral Tablet 600 MG (6/19/2024) Take 1 tablet three times a day for 30 day(s)  Vitamin D3 Oral Capsule 1.25 MG (86348 UT) (5/1/2024) Take 1 capsule once a week for 4 week(s)  Percocet Oral Tablet  MG (7/31/2024) Take 1 tablet twice a day as needed for 30 day(s)  Testosterone Cypionate Intramuscular Solution 200 MG/ML (11/7/2023) Inject 1/2 milliliter IM every 72 hours  Morphine Sulfate Intramuscular Device 10 MG/0.7ML (1/12/2017) Morphine Pain Pump  Aspirin Oral Tablet Delayed Release 325 MG (1/12/2017) Take 1 tablet twice a week for 30 week(s)  Atorvastatin Calcium Oral Tablet 10 MG  "(2017) Take 1 tablet once a day for 30 day(s)      Previous Studies:  CT SCAN  Final Report  CT CTIC SPINE LUMBAR W/O CONTRAST    Show Printer-Friendly Version with Images (4 of 5)  Show Printer-Friendly Version without images Patient Name: Jesse Andrade   : Mar-   ID: [id number]([facility])   Study Date:  08:03             Studies- CTIC SPINE LUMBAR W/O CONTRAST Indications- Lumbar radiculopathy. Loose leads on pain pump Comparison- None. Technique- CT of the lumbar spine was performed without the administration of intravenous contrast. Axial, sagittal, and coronal series were submitted for interpretation. Findings- Alignment of the lumbar vertebral bodies is normal. Intervertebral disc spaces as well as vertebral body heights are well maintained throughout the lumbar spine. Facet joints have normal anatomic relationships and minimal degenerative change. No acute fractures are demonstrated. The imaged intra-abdominal and intrapelvic contents demonstrate no evidence of acute pathology. Visualized portion of the "lead" demonstrates no evidence of discontinuity. Multiple enlarged lymph nodes are demonstrated within the ileocolic mesentery. These measure 7 mm in short axis dimension. Additional enlarged lymph nodes in the paracaval region measuring up to 6-7 mm. Borderline bilateral intrapelvic lymph nodes measuring up to 8 mm short axis dimension are demonstrated. Conclusion- 1. Multiple intrapelvic and mesenteric lymph nodes are demonstrated which are borderline in size to minimally enlarged. When compared to additional imaging of the abdomen and pelvis performed 2014, these lymph nodes appear to have minimally increased in size as well as number. Significance of these lymph nodes and relevant to patient's back pain is uncertain. Correlation recommended. 2. No significant abnormality of the lumbar spine is demonstrated. 3. No break in continuity of the demonstrated lead is present. " 2016 8-30 AM Ordering physician-SALINAS SINGH MD Point of service- [facility]. This report has been electronically signed by Ryley Kaplan Radiologist- RYLEY ALTMAN II, M.D. Releasing RadiologistKelly ALTMAN II, M.D. Released Date Time- 16 0845 ------------------------------------------------------------------------------     Signed by: Serena Weiner    Signed on:  08:30    [facility] 2021  Impression:  1. no acute fracture or subluxation within the lumber spine  2. Mild degenerative disc disease and facet/unconvertebral arthropathy at L5/S1. Moderate symmetric bilateral neuroforaminal stenoses at L5/S1  3. Very mild to mild midline broad-based posterior intervertebral disc bulges from L3/L4-L5/S1 without high grade spinal canal stenosis    CT [facility]  2023  Impression:  multilevel degenerative changes throughout the cervical spine; previous C5-C7 ACDF         ; X-RAY  Final Report  CR XR RIBS LEFT WITH PA/AP CHEST    Show Printer-Friendly Version Patient Name: Jesse Andrade   : Mar-   ID: [id number]([facility])   Study Date:  13:59             AP chest with left rib detail. Indication- Fall, with left chest wall pain. The heart size is normal. There is elevation of the right hemidiaphragm, stable finding. Surgical clips in the right upper quadrant. Postsurgical changes in the cervical spine. No pneumothorax. No pleural effusion. No rib fracture is seen. Impression- No acute abnormality. Place of service- [facility] This report has been electronically signed by Mariah Kaplan Physician- MARIAH Blanc PhysicianKelly TIERNEY M.D. Released Date Time- 18 1419 ------------------------------------------------------------------------------     Signed by: Serena Weiner    Signed on:  14:13  Final Report  LON [facility] SPINE CERVICAL  AP AND LATERAL  Show Printer-Friendly Version Patient Name: Jesse Andrade    : Mar-    ID: [id number]([facility])    Study Date: Mar- 12:39       CR SPINE CERVICAL AP AND LATERAL    Indication- Cervicalgia    Comparison- Cervical spine x-ray Shirley 15, 2005    Technique- Frontal and lateral views of the cervical spine.    Findings-     There is straightening of normal cervical lordosis which may be  positional or secondary muscle spasm. There is no significant  anterolisthesis or retrolisthesis.  Anterior cervical fusion hardware at  C5-C7 with osseous fusion. Mild marginal osteophyte formation noted at  C3-4 and C4-5. The C7-T1 level is not well visualized on lateral view.  No gross evidence of significant vertebral body height loss.    IMPRESSION-    Degenerative change and malalignment of the cervical spine as detailed  above.Anterior cervical fusion hardware at C5-C7 with osseous fusion.       Point of Service- [facility]    This report has been electronically signed by Santhosh Lemus          -  MARYBEL Kaplan Physician- SANTHOSH LEMUS D.O.       Releasing Physician- SANTHOSH LEMUS D.O.       Released Date Time- 19 1409   ------------------------------------------------------------------------------    Signed by: Serena Weiner Signed on: Mar- 13:58  ; Findings  LAB results from Dr. Daley:  2018  Vitamin D  10.8  Aug 2018  PSA  0.989  Testosterone 255  Total Testosterone 226  Free Testosterone 4.75     C spine X-ray 2019      Past Medical History:   The patient has a past medical history of  Hypertension, High Cholesterol, Osteoarthritis (OA), Joint Pain.  There is no past medical history of  Cardiac Pacer, Diabetes Mellitus type 1, Diabetes Mellitus type 2, Chronic Obstructive Pulmonary Disease, Rheumatoid Arthritis, Gastroesophageal Reflux Disease, Cerebrovascular Accident (CVA), Cardiovascular Disease, Alcoholism, Crohn's disease, Terminal  Illness.   severe dementianutritional quality good      Social History:      Smoking Status: Light tobacco smoker; Last Reviewed: 2024            Pack-years: 1         Date quit smoking: 10 years ago      Alcohol use: Non-Drinker  Racial background:   Occupation: disabled  Marital status:   Patient knows the purpose/use of medications  Patient is taking medications as prescribed               Family History:   Father  History remarkable for  gun shot.   Age 22;       Mother  History remarkable for  Coronary Artery Disease.   Age 42;       Review of Systems:   General:  Patient denies  sweats, fatigue, fever, chills.  Ears, Nose and Throat:  Patient denies  hearing loss, ringing in the ears.  Cardiovascular:  Patient denies  chest pain.  Respiratory:  Patient denies  shortness of breath.  Gastrointestinal:  Patient denies  nausea, vomiting, diarrhea, constipation.  Genitourinary:  Patient denies  urinary frequency, prostate problems.  Endocrine:  Patient denies  thyroid problems.  Hematologic:  Patient denies  bleeding tendencies, easy bruising tendency.  Musculoskeletal:  Patient denies  joint pain, walking aids.  Neurologic  Patient denies  seizures, headache.  Psychologic:  Patient denies  anxiety, panic attacks, depression.  Skin:  Patient denies  skin rash.       DEPRESSION SCREENING:   Not at all the patient reports little interest or pleasure in doing things.  Not at all the patient reports feeling down, depressed, or hopeless.  Date Depression Screening Last Done: 2020   PHQ-2 Score 0; PHQ-9 Score incomplete   Several days the patient reports little interest or pleasure in doing things.  Several days the patient reports feeling down, depressed, or hopeless.  Date Depression Screening Last Done: 2019      Vital Signs:   Weight 256 lbs; Height 6 ft 4 in; BMI 31.2   2024 9:44 AM (CST)  Temperature 98.6 °F   2024 9:46 AM (CST)  Respiration Rate  "18; Pulse Rate 72 bpm; Blood Pressure 140 / 94 mm/Hg; Pain Level: 8         Physical Examination:   Pre Anesthesia evaluation  Pre Op dx: lumbosacral spondylosis, disorder of sacrum  Planned procedure: Left SI joint injection   Age: 54  Ht: 6'4"  Wt: 237 lbs  BMI: 28.9  Allergies: Keflex  Meds/Labs/Test  Prior Surgeries:  Anesthesia complications: none known     Medical History  CNS: _X_Neg.   __Seizures  __CVA  __TIA  __HA  __Depression  Cardiac: __Neg  __CAD  __Stents  __MI  _X_HTN  __CHF  Pulmonary: __Neg  __COPD  __Asthma  __Sleep Apnea  __Smoker PPD  __CPAP  GI:_X_Neg.  __Reflux  __Liver Dysfunction  __Hepatitis  __Hiatal Hernia  __Hepatitis  __ETOH  __GERD   Renal:  _X_Neg.  __CRI  __ESRD  Endocrine:  _X_Neg.  __Thyroid  __Diabetes  Heme:  _X_Neg.   __Blood thinners  __other     Cranial Nerves II-XII grossly intact.  No apparent distress.  Patient is alert and oriented times three.  No somnolence or slurred speech.     Lumbar spine has pain with flexion and extension lateral rotation  Lumbar facets are tender to palpation  No focal neurologic deficits noted  physical exam essentially unchanged from previous   Back Motion:   Lumbar / lumbosacral spine abnormal.      Tenderness on Palpation:   Lumbosacral Spine:  There is tenderness on palpation of the  left sciatic notch moderate to severe in nature.      Other Examinations:   Left jethro-fabere test positive.   Gaenslen's Left Positive         Additional Physical Findings:  General general appearance normal appears comfortable,   Oriented to time, place and person, pleasant, seated  Not uncomfortable  Head normal head exam  Eyes normal eye exam  Chest normal chest exam  Respiratory normal respiratory exam  Musculoskeletal abnormal low back pain and knee pain,   Joint tenderness  Posture normal  Neurologic normal neurologic exam  Skin normal skin exam       Toxicology Report   Toxicology was performed.   Reason for Toxicology:  A presumptive urine drug screen " was done today to rapidly obtain and integrate results into clinical assessment and decision-making for ongoing safe prescribing of controlled substances.   Test Date/Time: 07/31/2024 00:00   Tested By: KONSTANTIN   Oxycodone  (OXY): Result = Positive; Control = Positive   Morphine  (OPI): Result = Positive; Control = Positive   Amphetamines  (AMP): Result = Negative; Control = Positive   Oxazepam  (BZO): Result = Negative; Control = Positive   Methadone  (MTD): Result = Negative; Control = Positive   Secobarbital  (BAR): Result = Negative; Control = Positive   Tricyclic Antidepressants  (TCA): Result = Negative; Control = Positive   Nortriptyline  (TCA): Result = Negative; Control = Positive   Marijuana-Carboxy Tetrahydrocannabinoid   (THC): Result = Negative; Control = Positive   Cocaine  (MEDARDO): Result = Negative; Control = Positive   Ecstasy-Methylenedioxymethamphetamine  (MDMA): Result = Negative; Control = Positive   D Methamphetamine  (MET): Result = Negative; Control = Positive   Phencyclidine  (PCP): Result = Negative; Control = Positive   Adulterants  (OX, SG, pH): Result = Negative; Control = Positive      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.1) - Testicular hypofunction  (M54.16) - Lumbar radiculopathy  (M47.812) - Cervical spondylosis without myelopathy  (M47.817) - Lumbosacral spondylosis  (M54.12) - Cervical radiculopathy  (M54.2) - Neck pain  (G89.4) - Chronic pain syndrome  (M53.3) - Disorder of sacrum      Plan:   Follow up visit 6 weeks      -refilled Percocet -- due 08/06/2024 (original hold date)  -gave rx for Sept with hold date 09/05/2024  -scheduled Left SI joint injection at [facility] on 08/14/2024 at 7:30 am   -increased Gabapentin to TID dosing (due again in Nov)   -refilled Testosterone with 2 RF (does not need refills)   -we have held Testosterone due to abnormal blood work and pt has an appt with Dr. Graves for sometime in Aug    -Sent rx for Flexeril 10 mg TID, Gabapentin 600 mg TID  and Voltaren gel to [facility] with 5 RF (due again in Nov)   -drink plenty of fluids and water  -increase fiber in diet  -call sooner with worsening pain  -pump refill was done on 03/06/2024  -next alarm date will be 08/30/2024  -scheduled Pump refill on 08/28/2024 at [facility] at 8:30 am   -refill Vit D 79037 unit weekly to [facility] with 5 RF  -will did receive cardiac clearance from Dr. Arteaga          Monitored Anesthesia Care medical necessity authorization request:   Monitor anesthesia request is medically indicated for the scheduled _SI joint injection_______procedure due to:     - needle phobia and anxiety, placing the patient at risk during the provided service._YES____  - patient has a BMI greater than 45 ____  - patient has severe sleep apnea for which BiPAP and oxygen are needed while sleeping._____  - patient is unable to follow simple commands due to mental state.____  - patient has an ASA class greater than 3 and requires constant presence of an anesthesiologist/CRNA during the procedure.____  - patient has severe problems with muscles and muscle spasticity that makes it hard to lie still. ____  - patient suffers from chronic pain and is unable to function due to diminished ADL's._YES___  - patient is dependent on opioids or sedatives. _YES___   - Other __YES__        Patient will be scheduled for SI joint injection(s). We have discussed the need for two diagnostically sound procedures before the radiofrequency ablation of the dorsal root rami can be scheduled.        Indications for this procedure for this specific patient include the following:   - Pt has had symptoms for three months with moderate to severe pain with functional impairment rated of  /10 pain.   - Pain non-responsive to conservative care.  - Pain is not associated with radiculopathy or claudication.  - No non-SI joint pathology as source of pain.  - Clinical assessment implicates SI  joints as putative pain source.   - Pain is exacerbated by extension or prolonged sitting/standing and relieved by rest.   - No unexplained neurologic deficit.   - No history of coagulopathy , infection or unstable medical conditions.  - Pain is causing significant functional limitation resulting in diminished quality of life and impaired age appropriate ADL's.  - Repeat injections not done prior to 7 days.        NSAIDS Failure__YES___  Pain for 3months or >_YES____  Pain level 6> intermittent or continuous__YES__  Physical exam with documented signs that SI are the primary source of pain__YES__           NARCOTIC STATEMENT  Patient is taking the narcotic pain medications as prescribed. Refill is being given because of the benefit to the patient in regards to the pain. Patient has agreed not to abuse of medication and not to take it more than what is prescribed. The nature of the drug including the potential for addiction and dependency and abuse was also discussed with the patient. Patient has developed physical dependency for the narcotic pain medication for his pain relief.  Patient has also developed tolerance to the sedative effect of the narcotic pain medications.  Patient has decided to continue with these medications despite potential for addiction as described by this office.  This was stressed to the patient that it is the patient's responsibility to secure the narcotic medication and in any event of loss for any reason whatsoever,  there will be no refill before the next due date. Patient also understands that they are not supposed to drive or work on machinery while taking these medications.  Also explained to the patient that in the event of traffic citation, the presence of this drug in  bloodstream may result in DUI.  Patient has been advised not to drink alcohol while taking this medication.  Patient has verbalized understanding of our office policy and has signed a contract with us in this  regard.                  Prescriptions Written Today:  Percocet Oral Tablet  MG  Take 1 tablet twice a day as needed for 30 day(s)  Refills: No Refills  Rx quantity: 60  Take 1 tablet twice a day as needed for 30 day(s)  Refills: No Refills  Rx quantity: 60  Take 1 tablet four times a day as needed for 30 day(s)  Refills: No Refills  Rx quantity: 120  Take 1 tablet twice a day as needed for 30 day(s)  Refills: No Refills  Rx quantity: 60                 Mary Torres       Electronically signed: 7/31/2024 1:13:00 PM      Jaziel Pierson MD      Electronically signed: 8/2/2024 9:58:04 AM       Chief Complaint:      HPI:       Assessment/Plan:         Jaziel Pierson MD  Pain Management  [facility] - Pain Management

## 2024-08-28 NOTE — OP NOTE
08/28/2024  Jesse Andrade 1970  PREOPERATIVE DIAGNOSIS:     Chronic pain syndrome           POSTOPERATIVE DIAGNOSIS:  Chronic pain syndrome                                                             PROCEDURE:  Intrathecal pump refill and reprogramming        SURGEON: Dr Jaziel Pierson              The patient was identified in the exam room and the pump was interrogated using the Medtronic system.  The pump was noted to have 2.6 cc remaining of a solution of morphine 10 mg per mL.  The patient's abdomen was marked in the appropriate location and prepped and draped in usual sterile fashion.  A 40 mL Medtronic pump refill kit was utilized for this procedure.  After the patient abdomen was prepped and draped in the usual sterile fashion, the Medtronic 40 mL template was placed on the skin overlying the target area of the pump orifice.  A 22-gauge noncoring needle was then utilized to access the pump.  A 20 mL syringe was utilized to aspirate the contents of the pump. Actual removed 2.9cc.   The solution was clear with no signs of serous fluid or blood. The patient then had a 40 mL allotment of morphine 10 mg per mL injected incrementally using the syringe and filter.  The needle was removed with its tip intact and the patient tolerated the procedure well with no adverse events.  The pump was then reprogrammed using the Medtronic system. Next fill on/before 02/21/2025

## 2024-08-28 NOTE — DISCHARGE SUMMARY
Patient underwent Intrathecal pump refill and reprogramming    procedure 08/28/2024. The pt will follow up in clinic. Discharged home. Discharge Dx: Chronic pain syndrome

## 2024-08-31 ENCOUNTER — EXTERNAL CHRONIC CARE MANAGEMENT (OUTPATIENT)
Dept: FAMILY MEDICINE | Facility: CLINIC | Age: 54
End: 2024-08-31
Payer: MEDICARE

## 2024-08-31 PROCEDURE — G0511 CCM/BHI BY RHC/FQHC 20MIN MO: HCPCS | Mod: ,,, | Performed by: FAMILY MEDICINE

## 2024-09-21 ENCOUNTER — HOSPITAL ENCOUNTER (EMERGENCY)
Facility: HOSPITAL | Age: 54
Discharge: HOME | End: 2024-09-21
Attending: EMERGENCY MEDICINE
Payer: MEDICARE

## 2024-09-21 ENCOUNTER — OFFICE VISIT (OUTPATIENT)
Dept: FAMILY MEDICINE | Facility: CLINIC | Age: 54
End: 2024-09-21
Payer: MEDICARE

## 2024-09-21 VITALS
TEMPERATURE: 98 F | RESPIRATION RATE: 18 BRPM | WEIGHT: 245 LBS | BODY MASS INDEX: 29.83 KG/M2 | HEIGHT: 76 IN | OXYGEN SATURATION: 97 % | SYSTOLIC BLOOD PRESSURE: 155 MMHG | DIASTOLIC BLOOD PRESSURE: 98 MMHG | HEART RATE: 69 BPM

## 2024-09-21 VITALS
BODY MASS INDEX: 29.83 KG/M2 | SYSTOLIC BLOOD PRESSURE: 152 MMHG | OXYGEN SATURATION: 96 % | HEART RATE: 76 BPM | DIASTOLIC BLOOD PRESSURE: 110 MMHG | WEIGHT: 245 LBS | RESPIRATION RATE: 20 BRPM | TEMPERATURE: 98 F | HEIGHT: 76 IN

## 2024-09-21 DIAGNOSIS — R07.81 RIB PAIN ON LEFT SIDE: Primary | ICD-10-CM

## 2024-09-21 DIAGNOSIS — J93.9 PNEUMOTHORAX ON LEFT: Primary | ICD-10-CM
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
## 2024-09-21 PROBLEM — N40.1 BENIGN PROSTATIC HYPERPLASIA WITH URINARY OBSTRUCTION: Status: ACTIVE | Noted: 2024-09-21

## 2024-09-21 PROBLEM — N13.8 BENIGN PROSTATIC HYPERPLASIA WITH URINARY OBSTRUCTION: Status: ACTIVE | Noted: 2024-09-21

## 2024-09-21 PROCEDURE — 99213 OFFICE O/P EST LOW 20 MIN: CPT | Mod: ,,, | Performed by: NURSE PRACTITIONER

## 2024-09-21 PROCEDURE — 3077F SYST BP >= 140 MM HG: CPT | Mod: ,,, | Performed by: NURSE PRACTITIONER

## 2024-09-21 PROCEDURE — 96372 THER/PROPH/DIAG INJ SC/IM: CPT | Performed by: EMERGENCY MEDICINE

## 2024-09-21 PROCEDURE — 1159F MED LIST DOCD IN RCRD: CPT | Mod: ,,, | Performed by: NURSE PRACTITIONER

## 2024-09-21 PROCEDURE — 1160F RVW MEDS BY RX/DR IN RCRD: CPT | Mod: ,,, | Performed by: NURSE PRACTITIONER

## 2024-09-21 PROCEDURE — 99284 EMERGENCY DEPT VISIT MOD MDM: CPT | Mod: 25

## 2024-09-21 PROCEDURE — 93005 ELECTROCARDIOGRAM TRACING: CPT

## 2024-09-21 PROCEDURE — 3080F DIAST BP >= 90 MM HG: CPT | Mod: ,,, | Performed by: NURSE PRACTITIONER

## 2024-09-21 PROCEDURE — 4010F ACE/ARB THERAPY RXD/TAKEN: CPT | Mod: ,,, | Performed by: NURSE PRACTITIONER

## 2024-09-21 PROCEDURE — 3008F BODY MASS INDEX DOCD: CPT | Mod: ,,, | Performed by: NURSE PRACTITIONER

## 2024-09-21 PROCEDURE — 63600175 PHARM REV CODE 636 W HCPCS: Performed by: EMERGENCY MEDICINE

## 2024-09-21 RX ORDER — MORPHINE SULFATE 4 MG/ML
4 INJECTION, SOLUTION INTRAMUSCULAR; INTRAVENOUS
Status: COMPLETED | OUTPATIENT
Start: 2024-09-21 | End: 2024-09-21

## 2024-09-21 RX ADMIN — MORPHINE SULFATE 4 MG: 4 INJECTION, SOLUTION INTRAMUSCULAR; INTRAVENOUS at 10:09

## 2024-09-21 NOTE — PROGRESS NOTES
"Subjective:       Patient ID: Jesse Andrade is a 54 y.o. male.    Chief Complaint: Chest Pain (Left rib pain/)    Fell on 9/7/24 while trying to catch a fly ball playing softball and landed on left rib area. Has been having some pain and mild SOB since then. Has not had any xrays. Has a morphine pain pump and Percocet at home. Sees. Mary Torres NP at a pain clinic. O2 sats 96%.    Flank Pain      Review of Systems   Constitutional: Negative.    Respiratory:  Positive for shortness of breath. Negative for cough, hemoptysis, sputum production and wheezing.    Cardiovascular: Negative.           Reviewed family, medical, surgical, and social history.    Objective:      BP (!) 152/110 (BP Location: Left arm, Patient Position: Sitting, BP Method: Large (Manual))   Pulse 76   Temp 98.2 °F (36.8 °C) (Oral)   Resp 20   Ht 6' 4" (1.93 m)   Wt 111.1 kg (245 lb)   SpO2 96%   BMI 29.82 kg/m²   Physical Exam  Vitals and nursing note reviewed.   Constitutional:       General: He is not in acute distress.     Appearance: Normal appearance. He is normal weight. He is not ill-appearing, toxic-appearing or diaphoretic.   HENT:      Head: Normocephalic.      Mouth/Throat:      Mouth: Mucous membranes are moist.   Cardiovascular:      Rate and Rhythm: Normal rate and regular rhythm.      Heart sounds: Normal heart sounds.   Pulmonary:      Effort: Pulmonary effort is normal.      Breath sounds: Normal breath sounds.   Chest:          Comments: Tenderness to left anterior ribs.   Musculoskeletal:      Cervical back: Normal range of motion and neck supple.   Skin:     General: Skin is warm and dry.      Capillary Refill: Capillary refill takes less than 2 seconds.   Neurological:      Mental Status: He is alert and oriented to person, place, and time.   Psychiatric:         Mood and Affect: Mood normal.         Behavior: Behavior normal.         Thought Content: Thought content normal.         Judgment: Judgment normal. "            No visits with results within 1 Day(s) from this visit.   Latest known visit with results is:   Admission on 05/13/2024, Discharged on 05/13/2024   Component Date Value Ref Range Status    Sodium 05/13/2024 138  136 - 145 mmol/L Final    Potassium 05/13/2024 4.3  3.5 - 5.1 mmol/L Final    Chloride 05/13/2024 99  98 - 107 mmol/L Final    CO2 05/13/2024 31  21 - 32 mmol/L Final    Anion Gap 05/13/2024 12  7 - 16 mmol/L Final    Glucose 05/13/2024 108 (H)  74 - 106 mg/dL Final    BUN 05/13/2024 18  7 - 18 mg/dL Final    Creatinine 05/13/2024 1.72 (H)  0.70 - 1.30 mg/dL Final    BUN/Creatinine Ratio 05/13/2024 10  6 - 20 Final    Calcium 05/13/2024 9.3  8.5 - 10.1 mg/dL Final    eGFR 05/13/2024 47 (L)  >=60 mL/min/1.73m2 Final    Spotted Fever Group Ab, IgG, S 05/13/2024 <1:64  <1:64 Final    No antibody detected.    Spotted Fever Group Ab, IgM, S 05/13/2024 <1:64  <1:64 Final    No antibody detected.     Test Performed by:  [place] [address]  : Otoniel Beth M.D. Ph.D.; CLIA# [id number]    Lyme Disease Serology, S 05/13/2024 Negative  Negative Final    No evidence of antibodies to B. burgdorferi detected.  False negative results may occur in recently infected  patients (<=2 weeks) due to low or undetectable antibody  levels to B. burgdorferi. If recent exposure is suspected,  a second sample should be collected and tested in 2-4 weeks.     Test Performed by:  [place] [address]  : Otoniel Beth M.D. Ph.D.; CLIA# [id number]    SARS COV-2 Molecular 05/13/2024 Invalid  Negative, Invalid Final    INFLUENZA A MOLECULAR 05/13/2024 Positive (A)  Negative Final    INFLUENZA B MOLECULAR  05/13/2024 Negative  Negative Final    WBC 05/13/2024 8.12  4.50 - 11.00 K/uL Final    RBC 05/13/2024 5.08  4.60 - 6.20 M/uL Final    Hemoglobin  05/13/2024 14.6  13.5 - 18.0 g/dL Final    Hematocrit 05/13/2024 45.9  40.0 - 54.0 % Final    MCV 05/13/2024 90.4  80.0 - 96.0 fL Final    MCH 05/13/2024 28.7  27.0 - 31.0 pg Final    MCHC 05/13/2024 31.8 (L)  32.0 - 36.0 g/dL Final    RDW 05/13/2024 13.5  11.5 - 14.5 % Final    Platelet Count 05/13/2024 223  150 - 400 K/uL Final    MPV 05/13/2024 11.1  9.4 - 12.4 fL Final    Neutrophils % 05/13/2024 75.0 (H)  53.0 - 65.0 % Final    Lymphocytes % 05/13/2024 18.8 (L)  27.0 - 41.0 % Final    Monocytes % 05/13/2024 5.9  2.0 - 6.0 % Final    Eosinophils % 05/13/2024 0.0 (L)  1.0 - 4.0 % Final    Basophils % 05/13/2024 0.1  0.0 - 1.0 % Final    Immature Granulocytes % 05/13/2024 0.2  0.0 - 0.4 % Final    nRBC, Auto 05/13/2024 0.0  <=0.0 % Final    Neutrophils, Abs 05/13/2024 6.08  1.80 - 7.70 K/uL Final    Lymphocytes, Absolute 05/13/2024 1.53  1.00 - 4.80 K/uL Final    Monocytes, Absolute 05/13/2024 0.48  0.00 - 0.80 K/uL Final    Eosinophils, Absolute 05/13/2024 0.00  0.00 - 0.50 K/uL Final    Basophils, Absolute 05/13/2024 0.01  0.00 - 0.20 K/uL Final    Immature Granulocytes, Absolute 05/13/2024 0.02  0.00 - 0.04 K/uL Final    nRBC, Absolute 05/13/2024 0.00  <=0.00 x10e3/uL Final    Diff Type 05/13/2024 Auto   Final    SARS COV-2 Molecular 05/13/2024 Negative  Negative, Invalid Final      Assessment:       1. Rib pain on left side        Plan:       Rib pain on left side  -     X-Ray Ribs 2 View Left; Future; Expected date: 09/21/2024  -     X-Ray Chest PA And Lateral; Future; Expected date: 09/21/2024    I will call with xray results  F/U with pain management  RTC PRN          Risks, benefits, and side effects were discussed with the patient. All questions were answered to the fullest satisfaction of the patient, and pt verbalized understanding and agreement to treatment plan. Pt was to call with any new or worsening symptoms, or present to the ER.

## 2024-09-22 NOTE — ED PROVIDER NOTES
Encounter Date: 9/21/2024    SCRIBE #1 NOTE: I, Aster Newsome, am scribing for, and in the presence of,  Raheel Tierney MD.       History     Chief Complaint   Patient presents with    Fall     Pt reports he was playing ball 2 weeks ago and fell on his left side. Since then it has hurt to take a deep breath. [facility] and [facility]. They called and told him to come to the ED because his left lung is collapsed and he has multiple broken ribs.      Patient is a 54 y.o. male that arrives to the ED with c/o Fall. The patient's spouse reports that 2 weeks ago the patient fell on his left side trying to catch a ball. Since then he reports that it has been hurting to take a deep breath. [facility] the [facility] and they did a X-ray. They called the patient and told him to come to the ED because his left lung is collapsed and he has multiple broken ribs. Patient has Mhx of HTN, HLD and Arthritis. When examined in the ED patient has tenderness on the left side.     The history is provided by the patient and the spouse. No  was used.     Review of patient's allergies indicates:   Allergen Reactions    Keflex [cephalexin]      Past Medical History:   Diagnosis Date    Arthritis     Hyperlipidemia     Hypertension     Sleep apnea      Past Surgical History:   Procedure Laterality Date    CERVICAL SPINE SURGERY      fusion    EPIDURAL STEROID INJECTION INTO CERVICAL SPINE Right 6/7/2023    Procedure: INJECTION, STEROID, SPINE, CERVICAL, EPIDURAL;  Surgeon: Jaziel Pierson MD;  [facility];  Service: Pain Management;  Laterality: Right;  C6-7 cath guided ARTHUR with right bias    EPIDURAL STEROID INJECTION INTO CERVICAL SPINE Left 10/25/2023    Procedure: INJECTION, STEROID, SPINE, CERVICAL, EPIDURAL;  Surgeon: Jaziel Pierson MD;  Location: [facility];  Service: Pain Management;  Laterality: Left;  Left C6-7 cath guided ARTHUR    FACIAL FRACTURE  SURGERY      INJECTION OF ANESTHETIC AGENT AROUND MEDIAL BRANCH NERVES INNERVATING CERVICAL FACET JOINT Bilateral 12/20/2023    Procedure: BLOCK, NERVE, FACET JOINT, CERVICAL, MEDIAL BRANCH;  Surgeon: Jaziel Pierson MD;  Location: [facility]IN [facility];  Service: Pain Management;  Laterality: Bilateral;    INJECTION OF ANESTHETIC AGENT AROUND MEDIAL BRANCH NERVES INNERVATING CERVICAL FACET JOINT Bilateral 4/17/2024    Procedure: BLOCK, NERVE, FACET JOINT, CERVICAL, MEDIAL BRANCH;  Surgeon: Jaziel Pierson MD;  Location: [facility];  Service: Pain Management;  Laterality: Bilateral;  Torey C5-7 MBNB    INJECTION OF ANESTHETIC AGENT AROUND MEDIAL BRANCH NERVES INNERVATING LUMBAR FACET JOINT Bilateral 10/26/2022    Procedure: BLOCK, NERVE, FACET JOINT, LUMBAR, MEDIAL BRANCH;  Surgeon: Jaziel Pierson MD;  Location: [facility];  Service: Pain Management;  Laterality: Bilateral;  Bilateral L2-5 MBNB    INJECTION OF ANESTHETIC AGENT INTO SACROILIAC JOINT Left 8/14/2024    Procedure: BLOCK, SACROILIAC JOINT;  Surgeon: Jaziel Pierson MD;  Location: [facility];  Service: Pain Management;  Laterality: Left;  Left SI JI    RADIOFREQUENCY ABLATION OF LUMBAR MEDIAL BRANCH NERVE AT SINGLE LEVEL Right 12/14/2022    Procedure: RADIOFREQUENCY ABLATION, NERVE, SPINAL, LUMBAR, MEDIAL BRANCH, 1 LEVEL;  Surgeon: Jaziel Pierson MD;  Location: [facility];  Service: Pain Management;  Laterality: Right;  Right L3-5 RFTC    RADIOFREQUENCY ABLATION OF LUMBAR MEDIAL BRANCH NERVE AT SINGLE LEVEL Bilateral 7/26/2023    Procedure: RADIOFREQUENCY ABLATION, NERVE, SPINAL, LUMBAR, MEDIAL BRANCH, 1 LEVEL;  Surgeon: Jaziel Pierson MD;  Location: [facility];  Service: Pain Management;  Laterality: Bilateral;  Bilateral L3-5 RFTC    RADIOFREQUENCY ABLATION OF LUMBAR MEDIAL BRANCH NERVE AT SINGLE LEVEL Bilateral 5/22/2024    Procedure: RADIOFREQUENCY ABLATION, NERVE, SPINAL, LUMBAR,  MEDIAL BRANCH, 1 LEVEL;  Surgeon: Jaziel Pierson MD;  Location: [facility]IN [facility];  Service: Pain Management;  Laterality: Bilateral;  Torey L3-5 RFTC    REFILL PAIN PUMP N/A 4/14/2021    Procedure: REFILLING, ANALGESIC PUMP;  Surgeon: Jaziel Pierson MD;  Location: [facility]IN [facility];  Service: Pain Management;  Laterality: N/A;  pump refill    REFILL PAIN PUMP N/A 10/27/2021    Procedure: REFILLING, ANALGESIC PUMP;  Surgeon: Jaziel Pierson MD;  Location: [facility];  Service: Pain Management;  Laterality: N/A;  Pump refill    REFILL PAIN PUMP N/A 4/13/2022    Procedure: REFILLING, ANALGESIC PUMP;  Surgeon: Jaziel Pierson MD;  Location: [facility];  Service: Pain Management;  Laterality: N/A;  Pump refill    REFILL PAIN PUMP N/A 10/5/2022    Procedure: REFILLING, ANALGESIC PUMP;  Surgeon: Jaziel Pierson MD;  Location: [facility];  Service: Pain Management;  Laterality: N/A;  Pump refill    REFILL PAIN PUMP N/A 3/29/2023    Procedure: REFILLING, ANALGESIC PUMP;  Surgeon: Jaziel Pierson MD;  Location: [facility];  Service: Pain Management;  Laterality: N/A;  Pump refill    REFILL PAIN PUMP N/A 9/20/2023    Procedure: REFILLING, ANALGESIC PUMP;  Surgeon: Jaziel Pierson MD;  Location: [facility];  Service: Pain Management;  Laterality: N/A;  pump refill    REFILL PAIN PUMP N/A 3/6/2024    Procedure: REFILLING, ANALGESIC PUMP;  Surgeon: Jaziel Pierson MD;  Location: [facility];  Service: Pain Management;  Laterality: N/A;  Pump refill    REFILL PAIN PUMP N/A 8/28/2024    Procedure: REFILLING, ANALGESIC PUMP;  Surgeon: Jaziel Pierson MD;  Location: [facility];  Service: Pain Management;  Laterality: N/A;  pump refill    TOTAL KNEE ARTHROPLASTY Bilateral      Family History   Problem Relation Name Age of Onset    Coronary artery disease Mother       Social History     Tobacco Use    Smoking status: Former      Current packs/day: 0.75     Average packs/day: 0.8 packs/day for 10.0 years (7.5 ttl pk-yrs)     Types: Cigarettes     Passive exposure: Past    Smokeless tobacco: Current     Types: Snuff   Substance Use Topics    Alcohol use: Never     Review of Systems   Constitutional:  Negative for chills and fever.   HENT:  Negative for sore throat.    Respiratory:  Negative for cough and shortness of breath.    Cardiovascular:  Negative for chest pain and leg swelling.   Gastrointestinal:  Negative for abdominal pain, diarrhea, nausea and vomiting.   Endocrine: Negative for polyuria.   Genitourinary:  Negative for flank pain and testicular pain.   All other systems reviewed and are negative.      Physical Exam     Initial Vitals [09/21/24 2053]   BP Pulse Resp Temp SpO2   (!) 151/92 76 20 98 °F (36.7 °C) 96 %      MAP       --         Physical Exam    Nursing note and vitals reviewed.  Constitutional: He appears well-developed and well-nourished.   HENT:   Head: Normocephalic and atraumatic.   Eyes: EOM are normal. Pupils are equal, round, and reactive to light.   Neck: Neck supple. No thyromegaly present.   Normal range of motion.  Cardiovascular:  Normal rate, regular rhythm, normal heart sounds and intact distal pulses.           No murmur heard.  Pulmonary/Chest: Breath sounds normal. No respiratory distress. He has no wheezes. He exhibits tenderness.   Patient has left sided tenderness when breathing deep.   Abdominal: Abdomen is soft. Bowel sounds are normal. There is no abdominal tenderness.   Musculoskeletal:         General: No tenderness or edema. Normal range of motion.      Cervical back: Normal range of motion and neck supple.     Lymphadenopathy:     He has no cervical adenopathy.   Neurological: He is alert and oriented to person, place, and time. He has normal strength and normal reflexes. No cranial nerve deficit or sensory deficit. GCS score is 15. GCS eye subscore is 4. GCS verbal subscore is 5. GCS motor  subscore is 6.   Skin: Skin is warm and dry. Capillary refill takes less than 2 seconds. No rash noted.   Psychiatric: He has a normal mood and affect.         ED Course   Procedures  Labs Reviewed - No data to display       Imaging Results    None          Medications   morphine injection 4 mg (4 mg Intramuscular Given 9/21/24 4036)     Medical Decision Making  Patient is a 54 y.o. male that arrives to the ED with c/o Fall two weeks ago he continued to have pain on the left side he went to his primary care physician and found out that he has tiny left pneumothorax with left rib fractures.  He was referred to the emergency department.    Amount and/or Complexity of Data Reviewed  Discussion of management or test interpretation with external provider(s): The pneumothorax is very tiny.  I talked to Dr. Felton patten (general surgery).  He recommended that the patient follow up with him in one-week in the office.  The patient voiced understanding.    Risk  Prescription drug management.              Attending Attestation:           Physician Attestation for Scribe:  Physician Attestation Statement for Scribe #1: I, Raheel Tierney MD, reviewed documentation, as scribed by Aster Newsome in my presence, and it is both accurate and complete.                                    Clinical Impression:  Final diagnoses:  [J93.9] Pneumothorax on left - Tiny apical pneumothorax (Primary)          ED Disposition Condition    Discharge Stable          ED Prescriptions    None       Follow-up Information       Follow up With Specialties Details Why Contact Info    Otilio Vargas MD Family Medicine In 3 days For follow-up [address] [facility].  [facility] [address]  [phone]               Raheel Tierney MD  09/22/24 0157

## 2024-09-24 LAB
OHS QRS DURATION: 96 MS
OHS QTC CALCULATION: 415 MS

## 2024-09-30 ENCOUNTER — EXTERNAL CHRONIC CARE MANAGEMENT (OUTPATIENT)
Dept: FAMILY MEDICINE | Facility: CLINIC | Age: 54
End: 2024-09-30
Payer: MEDICARE

## 2024-09-30 PROCEDURE — G0511 CCM/BHI BY RHC/FQHC 20MIN MO: HCPCS | Mod: ,,, | Performed by: FAMILY MEDICINE

## 2024-10-01 ENCOUNTER — HOSPITAL ENCOUNTER (OUTPATIENT)
Dept: RADIOLOGY | Facility: HOSPITAL | Age: 54
Discharge: HOME | End: 2024-10-01
Attending: SURGERY
Payer: MEDICARE

## 2024-10-01 ENCOUNTER — OFFICE VISIT (OUTPATIENT)
Dept: SURGERY | Facility: CLINIC | Age: 54
End: 2024-10-01
Attending: SURGERY
Payer: MEDICARE

## 2024-10-01 DIAGNOSIS — J93.9 PNEUMOTHORAX ON LEFT: ICD-10-CM

## 2024-10-01 PROCEDURE — 99203 OFFICE O/P NEW LOW 30 MIN: CPT | Mod: S$PBB,,, | Performed by: SURGERY

## 2024-10-01 PROCEDURE — 99999 PR PBB SHADOW E&M-EST. PATIENT-LVL IV: CPT | Mod: PBBFAC,,, | Performed by: SURGERY

## 2024-10-01 PROCEDURE — 1159F MED LIST DOCD IN RCRD: CPT | Mod: CPTII,,, | Performed by: SURGERY

## 2024-10-01 PROCEDURE — 99214 OFFICE O/P EST MOD 30 MIN: CPT | Mod: PBBFAC,25 | Performed by: SURGERY

## 2024-10-01 PROCEDURE — 71046 X-RAY EXAM CHEST 2 VIEWS: CPT | Mod: 26,,, | Performed by: RADIOLOGY

## 2024-10-01 PROCEDURE — 71046 X-RAY EXAM CHEST 2 VIEWS: CPT | Mod: TC

## 2024-10-01 PROCEDURE — 4010F ACE/ARB THERAPY RXD/TAKEN: CPT | Mod: CPTII,,, | Performed by: SURGERY

## 2024-10-01 NOTE — PROGRESS NOTES
General Surgery History and Physical      Patient ID: Jesse Andrade is a 54 y.o. male.    Chief Complaint: Other (pnuemothorax)      HPI:  54-year-old male who was playing softball about 3 weeks ago fell landed on his left side has some pretty severe pain shortness of breath.   Did at home for about 2 weeks and finally went to the ER was found to have a small left apical pneumothorax.  Was discharged to home in stable condition with close follow up with me to make sure that there was no worsening of his pneumothorax.  Still having some mild left-sided chest discomfort with some mild intermittent shortness of breath.    Review of Systems   Constitutional:  Negative for activity change, appetite change, fatigue and fever.   HENT:  Negative for trouble swallowing.    Respiratory:  Positive for shortness of breath. Negative for cough.    Cardiovascular:  Negative for chest pain and palpitations.   Gastrointestinal:  Negative for abdominal distention, abdominal pain, blood in stool, constipation and diarrhea.   Genitourinary:  Negative for flank pain.   Musculoskeletal:  Negative for neck pain and neck stiffness.   Neurological:  Negative for weakness.       Current Outpatient Medications   Medication Sig Dispense Refill    albuterol (PROAIR HFA) 90 mcg/actuation inhaler Inhale 2 puffs into the lungs every 6 (six) hours as needed for Wheezing. Rescue 18 g 1    amLODIPine (NORVASC) 5 MG tablet Take 1 tablet (5 mg total) by mouth once daily. 90 tablet 3    aspirin 81 MG Chew TAKE ONE (1) TABLET BY MOUTH ONCE A DAY 90 tablet 3    atorvastatin (LIPITOR) 40 MG tablet Take 2 tablets (80 mg total) by mouth every evening. 180 tablet 3    carvediloL (COREG) 25 MG tablet Take 1 tablet (25 mg total) by mouth 2 (two) times daily with meals. 180 tablet 3    cholecalciferol, vitamin D3, 1,250 mcg (50,000 unit) capsule Take 1 capsule  (50,000 Units total) by mouth once a week for four (4) weeks. 4 capsule 4    cyclobenzaprine (FLEXERIL) 10 MG tablet Take 10 mg by mouth 3 (three) times daily as needed for Muscle spasms.      cyclobenzaprine (FLEXERIL) 10 MG tablet Take 1 tablet (10 mg total) by mouth 2 (two) times daily as needed. 60 tablet 5    cyclobenzaprine (FLEXERIL) 10 MG tablet Take one (1) tablet by mouth twice a day as needed. 60 tablet 1    diclofenac sodium (VOLTAREN) 1 % Gel Apply 2 grams topically to the affected areas three times a day for 30 day(s) 180 g 5    fluticasone propionate (FLONASE) 50 mcg/actuation nasal spray 1-2 sprays by Nasal route once daily. (Patient not taking: Reported on 9/21/2024)      gabapentin (NEURONTIN) 600 MG tablet Take 1 tablet three times a day for 30 day(s) 90 tablet 4    gabapentin (NEURONTIN) 600 MG tablet Take 1 tablet three times a day for 30 day(s) 90 tablet 0    lisinopriL (PRINIVIL,ZESTRIL) 40 MG tablet TAKE ONE (1) TABLET BY MOUTH ONCE A DAY 90 tablet 3    morphine 4 mg/mL Soln injection Inject 4 mg into the vein every 4 (four) hours.      ondansetron (ZOFRAN-ODT) 4 MG TbDL Take 1 tablet (4 mg total) by mouth every 8 (eight) hours as needed (nausea, vomiting). 15 tablet 0    oxyCODONE-acetaminophen (PERCOCET)  mg per tablet Take 1 tablet by mouth 2 (two) times daily as needed for pain... May cause drowsiness (Patient not taking: Reported on 9/21/2024) 60 tablet 0     No current facility-administered medications for this visit.       Review of patient's allergies indicates:   Allergen Reactions    Keflex [cephalexin]        Past Medical History:   Diagnosis Date    Arthritis     Hyperlipidemia     Hypertension     Sleep apnea        Past Surgical History:   Procedure Laterality Date    CERVICAL SPINE SURGERY      fusion    EPIDURAL STEROID INJECTION INTO CERVICAL SPINE Right 6/7/2023    Procedure: INJECTION, STEROID, SPINE, CERVICAL, EPIDURAL;  Surgeon: Jaziel Pierson MD;  Location:  [facility];  Service: Pain Management;  Laterality: Right;  C6-7 cath guided ARTHUR with right bias    EPIDURAL STEROID INJECTION INTO CERVICAL SPINE Left 10/25/2023    Procedure: INJECTION, STEROID, SPINE, CERVICAL, EPIDURAL;  Surgeon: Jaziel Pierson MD;  Location: [facility];  Service: Pain Management;  Laterality: Left;  Left C6-7 cath guided ARTHUR    FACIAL FRACTURE SURGERY      INJECTION OF ANESTHETIC AGENT AROUND MEDIAL BRANCH NERVES INNERVATING CERVICAL FACET JOINT Bilateral 12/20/2023    Procedure: BLOCK, NERVE, FACET JOINT, CERVICAL, MEDIAL BRANCH;  Surgeon: Jaziel Pierson MD;  Location: [facility];  Service: Pain Management;  Laterality: Bilateral;    INJECTION OF ANESTHETIC AGENT AROUND MEDIAL BRANCH NERVES INNERVATING CERVICAL FACET JOINT Bilateral 4/17/2024    Procedure: BLOCK, NERVE, FACET JOINT, CERVICAL, MEDIAL BRANCH;  Surgeon: Jaziel Pierson MD;  Location: [facility];  Service: Pain Management;  Laterality: Bilateral;  Torey C5-7 MBNB    INJECTION OF ANESTHETIC AGENT AROUND MEDIAL BRANCH NERVES INNERVATING LUMBAR FACET JOINT Bilateral 10/26/2022    Procedure: BLOCK, NERVE, FACET JOINT, LUMBAR, MEDIAL BRANCH;  Surgeon: Jaziel Pierson MD;  Location: [facility];  Service: Pain Management;  Laterality: Bilateral;  Bilateral L2-5 MBNB    INJECTION OF ANESTHETIC AGENT INTO SACROILIAC JOINT Left 8/14/2024    Procedure: BLOCK, SACROILIAC JOINT;  Surgeon: Jaziel Pierson MD;  Location: [facility];  Service: Pain Management;  Laterality: Left;  Left SI JI    RADIOFREQUENCY ABLATION OF LUMBAR MEDIAL BRANCH NERVE AT SINGLE LEVEL Right 12/14/2022    Procedure: RADIOFREQUENCY ABLATION, NERVE, SPINAL, LUMBAR, MEDIAL BRANCH, 1 LEVEL;  Surgeon: Jaziel Pierson MD;  Location: [facility];  Service: Pain Management;  Laterality: Right;  Right L3-5 RFTC    RADIOFREQUENCY ABLATION OF LUMBAR MEDIAL BRANCH NERVE AT SINGLE LEVEL  Bilateral 7/26/2023    Procedure: RADIOFREQUENCY ABLATION, NERVE, SPINAL, LUMBAR, MEDIAL BRANCH, 1 LEVEL;  Surgeon: Jaziel Pierson MD;  Location: [facility];  Service: Pain Management;  Laterality: Bilateral;  Bilateral L3-5 RFTC    RADIOFREQUENCY ABLATION OF LUMBAR MEDIAL BRANCH NERVE AT SINGLE LEVEL Bilateral 5/22/2024    Procedure: RADIOFREQUENCY ABLATION, NERVE, SPINAL, LUMBAR, MEDIAL BRANCH, 1 LEVEL;  Surgeon: Jaziel Pierson MD;  Location: [facility];  Service: Pain Management;  Laterality: Bilateral;  Torey L3-5 RFTC    REFILL PAIN PUMP N/A 4/14/2021    Procedure: REFILLING, ANALGESIC PUMP;  Surgeon: Jaziel Pierson MD;  Location: [facility];  Service: Pain Management;  Laterality: N/A;  pump refill    REFILL PAIN PUMP N/A 10/27/2021    Procedure: REFILLING, ANALGESIC PUMP;  Surgeon: Jaziel Pierson MD;  Location: [facility];  Service: Pain Management;  Laterality: N/A;  Pump refill    REFILL PAIN PUMP N/A 4/13/2022    Procedure: REFILLING, ANALGESIC PUMP;  Surgeon: Jaziel Pierson MD;  Location: [facility];  Service: Pain Management;  Laterality: N/A;  Pump refill    REFILL PAIN PUMP N/A 10/5/2022    Procedure: REFILLING, ANALGESIC PUMP;  Surgeon: Jaziel Pierson MD;  Location: [facility];  Service: Pain Management;  Laterality: N/A;  Pump refill    REFILL PAIN PUMP N/A 3/29/2023    Procedure: REFILLING, ANALGESIC PUMP;  Surgeon: Jaziel Pierson MD;  Location: [facility];  Service: Pain Management;  Laterality: N/A;  Pump refill    REFILL PAIN PUMP N/A 9/20/2023    Procedure: REFILLING, ANALGESIC PUMP;  Surgeon: Jaziel Pierson MD;  [facility];  Service: Pain Management;  Laterality: N/A;  pump refill    REFILL PAIN PUMP N/A 3/6/2024    Procedure: REFILLING, ANALGESIC PUMP;  Surgeon: Jaziel Pierson MD;  Location: [facility];  Service: Pain Management;  Laterality: N/A;  Pump  refill    REFILL PAIN PUMP N/A 8/28/2024    Procedure: REFILLING, ANALGESIC PUMP;  Surgeon: Jaziel Pierson MD;  Location: [facility];  Service: Pain Management;  Laterality: N/A;  pump refill    TOTAL KNEE ARTHROPLASTY Bilateral        Family History   Problem Relation Name Age of Onset    Coronary artery disease Mother         Social History     Socioeconomic History    Marital status:    Tobacco Use    Smoking status: Former     Current packs/day: 0.75     Average packs/day: 0.8 packs/day for 10.0 years (7.5 ttl pk-yrs)     Types: Cigarettes     Passive exposure: Past    Smokeless tobacco: Current     Types: Snuff   Substance and Sexual Activity    Alcohol use: Never       There were no vitals filed for this visit.    Physical Exam  Constitutional:       General: He is not in acute distress.  HENT:      Head: Normocephalic.   Cardiovascular:      Rate and Rhythm: Normal rate and regular rhythm.      Pulses: Normal pulses.   Pulmonary:      Effort: Pulmonary effort is normal. No respiratory distress.      Breath sounds: Normal breath sounds.      Comments: No obvious skin changes bruising or problems.  Lungs clear to auscultation.  Abdominal:      General: Abdomen is flat. There is no distension.      Palpations: Abdomen is soft.      Tenderness: There is no abdominal tenderness.   Musculoskeletal:         General: Normal range of motion.   Skin:     General: Skin is warm.   Neurological:      General: No focal deficit present.      Mental Status: He is oriented to person, place, and time.         Assessment & Plan:    Pneumothorax on left  Comments:  Tiny apical pneumothorax  Orders:  -     Ambulatory referral/consult to General Surgery  -     X-Ray Chest PA And Lateral; Future; Expected date: 10/01/2024        Chest x-ray performed today shows no residual pneumothorax and I can tell.  Will follow up the official report.  Otherwise stable broken ribs recommend conservative treatment.  Patient  told to come back and see me for any worsening pain shortness of breath or any other issues.

## 2024-10-31 ENCOUNTER — EXTERNAL CHRONIC CARE MANAGEMENT (OUTPATIENT)
Dept: FAMILY MEDICINE | Facility: CLINIC | Age: 54
End: 2024-10-31
Payer: MEDICARE

## 2024-10-31 PROCEDURE — G0511 CCM/BHI BY RHC/FQHC 20MIN MO: HCPCS | Mod: ,,, | Performed by: FAMILY MEDICINE

## 2024-11-25 RX ORDER — CARVEDILOL 25 MG/1
25 TABLET ORAL 2 TIMES DAILY WITH MEALS
Qty: 180 TABLET | Refills: 3 | Status: SHIPPED | OUTPATIENT
Start: 2024-11-25

## 2024-11-30 ENCOUNTER — EXTERNAL CHRONIC CARE MANAGEMENT (OUTPATIENT)
Dept: FAMILY MEDICINE | Facility: CLINIC | Age: 54
End: 2024-11-30
Payer: MEDICARE

## 2024-11-30 PROCEDURE — G0511 CCM/BHI BY RHC/FQHC 20MIN MO: HCPCS | Mod: ,,, | Performed by: FAMILY MEDICINE

## 2024-12-09 ENCOUNTER — PATIENT OUTREACH (OUTPATIENT)
Facility: HOSPITAL | Age: 54
End: 2024-12-09
Payer: MEDICARE

## 2024-12-09 NOTE — PROGRESS NOTES
Population Health Chart Review & Patient Outreach Details    [facility] Bp Report    Updates Requested / Reviewed:  [x]  Care Team Updated  [x]  Care Everywhere Updated & Reviewed      Health Maintenance Topics Addressed and Outreach Outcomes / Actions Taken:  Blood Pressure Control  [x] Spoke with pt via phone. Patient rarely monitors Bp at home. Patient has not check it in several weeks. Patient reports that Dr. Vargas is still his PCP. Informed pt that it is past time for a check up. Appointment scheduled on 1/7/25 with PCP. Patient voiced understanding.     [x] Comment placed in chart and upcoming appointment note that blood pressure needs to be less than 139/89.

## 2024-12-18 ENCOUNTER — ANESTHESIA EVENT (OUTPATIENT)
Dept: PAIN MEDICINE | Facility: HOSPITAL | Age: 54
End: 2024-12-18
Payer: MEDICARE

## 2024-12-18 ENCOUNTER — HOSPITAL ENCOUNTER (OUTPATIENT)
Facility: HOSPITAL | Age: 54
Discharge: HOME | End: 2024-12-18
Attending: ANESTHESIOLOGY | Admitting: ANESTHESIOLOGY
Payer: MEDICARE

## 2024-12-18 ENCOUNTER — ANESTHESIA (OUTPATIENT)
Dept: PAIN MEDICINE | Facility: HOSPITAL | Age: 54
End: 2024-12-18
Payer: MEDICARE

## 2024-12-18 VITALS
TEMPERATURE: 97 F | DIASTOLIC BLOOD PRESSURE: 92 MMHG | HEART RATE: 71 BPM | RESPIRATION RATE: 15 BRPM | SYSTOLIC BLOOD PRESSURE: 145 MMHG | OXYGEN SATURATION: 98 %

## 2024-12-18 DIAGNOSIS — M47.817 LUMBOSACRAL SPONDYLOSIS WITHOUT MYELOPATHY: ICD-10-CM

## 2024-12-18 PROCEDURE — 37000008 HC ANESTHESIA 1ST 15 MINUTES: Performed by: ANESTHESIOLOGY

## 2024-12-18 PROCEDURE — 27096 INJECT SACROILIAC JOINT: CPT | Mod: LT | Performed by: ANESTHESIOLOGY

## 2024-12-18 PROCEDURE — 25500020 PHARM REV CODE 255: Performed by: ANESTHESIOLOGY

## 2024-12-18 PROCEDURE — 63600175 PHARM REV CODE 636 W HCPCS: Performed by: NURSE ANESTHETIST, CERTIFIED REGISTERED

## 2024-12-18 PROCEDURE — 63600175 PHARM REV CODE 636 W HCPCS: Performed by: ANESTHESIOLOGY

## 2024-12-18 RX ORDER — TRIAMCINOLONE ACETONIDE 40 MG/ML
INJECTION, SUSPENSION INTRA-ARTICULAR; INTRAMUSCULAR CODE/TRAUMA/SEDATION MEDICATION
Status: DISCONTINUED | OUTPATIENT
Start: 2024-12-18 | End: 2024-12-18 | Stop reason: HOSPADM

## 2024-12-18 RX ORDER — LIDOCAINE HYDROCHLORIDE 20 MG/ML
INJECTION, SOLUTION EPIDURAL; INFILTRATION; INTRACAUDAL; PERINEURAL
Status: DISCONTINUED | OUTPATIENT
Start: 2024-12-18 | End: 2024-12-18

## 2024-12-18 RX ORDER — SODIUM CHLORIDE 9 MG/ML
500 INJECTION, SOLUTION INTRAVENOUS CONTINUOUS
Status: DISCONTINUED | OUTPATIENT
Start: 2024-12-18 | End: 2024-12-18 | Stop reason: HOSPADM

## 2024-12-18 RX ORDER — PROPOFOL 10 MG/ML
VIAL (ML) INTRAVENOUS
Status: DISCONTINUED | OUTPATIENT
Start: 2024-12-18 | End: 2024-12-18

## 2024-12-18 RX ORDER — BUPIVACAINE HYDROCHLORIDE 2.5 MG/ML
INJECTION, SOLUTION INFILTRATION; PERINEURAL CODE/TRAUMA/SEDATION MEDICATION
Status: DISCONTINUED | OUTPATIENT
Start: 2024-12-18 | End: 2024-12-18 | Stop reason: HOSPADM

## 2024-12-18 RX ORDER — IOPAMIDOL 612 MG/ML
INJECTION, SOLUTION INTRAVASCULAR CODE/TRAUMA/SEDATION MEDICATION
Status: DISCONTINUED | OUTPATIENT
Start: 2024-12-18 | End: 2024-12-18 | Stop reason: HOSPADM

## 2024-12-18 RX ADMIN — PROPOFOL 100 MG: 10 INJECTION, EMULSION INTRAVENOUS at 08:12

## 2024-12-18 RX ADMIN — LIDOCAINE HYDROCHLORIDE 80 MG: 20 INJECTION, SOLUTION EPIDURAL; INFILTRATION; INTRACAUDAL; PERINEURAL at 08:12

## 2024-12-18 NOTE — PLAN OF CARE
Plan:  D/c pt via wheelchair at 0915  Informed pt if does not void in 8 hours to go to ER. Notify if redness, drainage, from injection site or fever over next 3-4 days. Rest and drink plenty of fluids for the remainder of the day. No lifting over 5 lbs. For the remainder of the day. Continue regular medications as prescribed. May take pain medications as prescribed.     Pain improved 50%

## 2024-12-18 NOTE — ANESTHESIA POSTPROCEDURE EVALUATION
Anesthesia Post Evaluation    Patient: Jesse Andrade    Procedure(s) Performed: Procedure(s) (LRB):  BLOCK, SACROILIAC JOINT (Left)    Final Anesthesia Type: MAC      Patient location during evaluation: PACU  Patient participation: Yes- Able to Participate  Level of consciousness: awake and alert  Post-procedure vital signs: reviewed and stable  Pain management: adequate  Airway patency: patent    PONV status at discharge: No PONV  Anesthetic complications: no      Cardiovascular status: stable  Respiratory status: unassisted  Hydration status: euvolemic  Follow-up not needed.              Vitals Value Taken Time   /97 12/18/24 0910   Temp 97.5 12/18/24 1109   Pulse 73 12/18/24 0910   Resp 16 12/18/24 0846   SpO2 99 % 12/18/24 0910   Vitals shown include unfiled device data.      Event Time   Out of Recovery 09:05:00         Pain/Alexi Score: Alexi Score: 10 (12/18/2024  8:50 AM)

## 2024-12-18 NOTE — H&P
"[facility] - Pain Management  Pain Management  H&P    Patient Name: Jesse Andrade  MRN: [id number]  Admission Date: 2024  Primary Care Provider: Otilio Vargas MD    Patient information was obtained from     Subjective:     Principal Problem:  [facility]  [address]  [place] [address]  [phone]                   RE: Jesse Andrade      : 1970   Date of Service: 2024   Existing Patient           Chief Complaint:   Neck pain described as aching, constant, dull, sharp; Location bilaterally, at the midline, radiating to shoulder(s); aggravated by activity, poor posture; relieved by narcotics, rest-lying down; onset gradual, constant; reported as 6/10 on pain scale,  Back pain achy in nature, constant, dull; located in the lumbar region, in the midline; aggravated by activity, standing, bending, lifting, walking, weather/temperature change; relieved by analgesics, rest; gradual in occurrence; pain rated as 4/10 on the pain scale,  Knee pain Location right patella; characterized as aching aggravated by walking Timing constant Severity moderate to severe.   Patient seated in room 2 with c/o neck and right hip pain, reports pain is unchanged since last visit          [place] Prescription Monitoring Program data was reviewed for this patient for the past 12 calendar months to ascertain any current, or past use of scheduled medications.      History of Present Illness:   What part of the body? neck and right hip   Pain level at best 3; Pain level at worst 6; Pain level at present 7; Pain level on average 6   53 y/o BM with complaints of "low back pain and Right knee pain"; objective data essentially unchanged from previous visit; states that pain improved by 80% after Bilateral L4-L5 RFTC in May 2024 and is still doing good with pain relief; he was having more pain to Left SI Joint injection that improved pain by 80% that lasted for a couple of days; prior to " last visit, he was playing softball and fell while he was trying to catch a fly ball and fractured 2 ribs that caused a pneumothorax but has completely healed and is ready to have injection to help with Left SI joint injection; he was also able to see Dr. Graves for abnormal PSA and was told everything was ok and can restart testosterone; he is also complaining of generalized myalgia; he was able to have his Bilateral C5-C7 MBNB #2 that improved pain by 100% for several days but is now hurting more but defers neck procedures for now; his previous lumbar RFTC was July 2023 that improved by 80% and has lasted for over 9 months; also states that gabapentin helps when he takes it but does not last 12 hrs-we will increase to TID dosing; he had his Pump refill in March; he has driven to [place] for a death in the family; he is having more neck pain and pain to both shoulders; states that pain improved by 75% after Bilateral C3-C5 MBNB #1 and is still doing good with pain relief; he is still doing good with numbness and tingling and has been able to sleep better; his last cervical ARTHUR helped with numbness to LUE but MBNB helped pain to neck; he was able to have his pump refilled and will be due again in March; he is still having more left side neck pain with numbness to fingers; states that pain improved by 80% after Bilateral L4-L5 RFTC and still continues to do well with lower back pain relief; states that pain improved by 85% after cervical ARTHUR but will occasionally have some LUE tingling that lasted for several weeks but is now ready to have an additional injection to help with neck pain without radicular symptoms; his last cervical RF was June 2022; he was able to have his Lumbar RFTC done in Dec but only did the Right side because he took his ASA that morning; he actually had about 80% improvement of pain; pain is only worse with increased movements and first in the morning but improves throughout the day; he has  already had his pump refill on 10/05 as well as #2 MBNB that improved by 85% that lasted for a couple of weeks; states that pain improved by 90% that lasted about 2 weeks after Bilateral L2-L5 MBNB #1; he has just recently had his Cervical RFTC in June that has improved his pain and is not having much pain at all to his cervical spine; states that pain improved by 80% after Left C3-C7 RFTC in Nov 2021; overall, pain has been about the same except for some worsening lower back pain; states that pain improved by 80% after Bilateral C3-C7 MBNB that lasted for about a week; pain does get better with movement but tingling to his fingers has improved; we will consider ARTHUR on return if tingling persists/returns but this has improved since last procedure; pain improved by 85% after Bilateral L3-L5 MBNB in 09/2021 that lasted for several months and is worse now with bending and moving; he was able to have his CT Lspine 01/2021 that revealed some changes but the last injection helped with pain; he was also able to have his pump refilled in Oct and will be due again in April;  states that he has started walking some but causes worsening pain; states that pain improved by 75% after Bilateral C3-C7 MBNB in Dec that lasted for several months but pain is slowly returning and is ready to schedule a neck injection to help with radicular symptoms at this time; denies any radiation of pain into legs and no numbness or tingling to upper or lower extremities; exercise has helped some but pain is still there; states that neck pain has been worse the past several months that wakes him up at night; he is now having more pain that prevents sleeping and from doing every day activity; states that pain improved by 75% after his Bilateral C3-C7 RFTC that lasted for several months; states that pain improved after Bilateral C3-C7 #1 about 80% and then has had a 80% improvement in pain after his Bilateral C3-C7 FI#2 as well as had 50% improvement  of ADL's; He has been going to physical therapy after having a R knee replacement in Jan 2019 by Dr. Garcia and has had more pain to knee but Percocet is helping better with that pain as well; he is still exercising some but continues to have pain and swelling; usually ice, rest and meds help with this; he sees Dr. Garcia again in July 2020 but feels like it is not healing as well as it should; he has been going to the gym to help with pain and strengthening; states he has been doing well and is better today since last visit after having his last injection; He describes the pain as dull pain that is constant; he has also started taking testosterone injections at home but will have an appt with Dr. Graves hopefully in Aug; denies any issues with constipation; He has been experiencing some tingling and numbness to his R thigh; he started Neurontin 300mg BID since last visit and states it is helping with the numbness and tingling sensation and request to continue with 600 mg daily; also states that the Flexeril has helped better with muscle spasms     UDS: consistent x 22; inconsistent x 3 (no percocet, +Norco that he had left over from the past but instructed that this can not happen again)   The previous urine drug screen was evaluated, and it was compliant for the medications that has been prescribed. A presumptive urine drug screen was done today to rapidly obtain and integrate results into clinical assessment and decision-making for ongoing safe prescribing of controlled substances. The results of the presumptive UDS done today was positive for opiates. He is prescribed oxycodone. Because presumptive UDS positive results are not definitive due to sensitivity and specificity and cross reactivity limitations and negative results do not necessarily indicate absence of drugs or substances in the urine specimen, confirmation will identify specific prescribed and non-prescribed medications or illicit use for ongoing  safe prescribing of controlled substances including benzodiazepines, opioid agonist, opioids antagonist, partial agonist, stimulants, muscle relaxers, antidepressants, sleep aids, anti-seizure medicine, and alcohol. Urine drug analysis is used to assist with diagnosis and therapeutic decision-making concerning pretreatment assessment. Intensity and frequency of monitoring with urine drug testing will be based on the risk stratification method in determining risk level for opioid addiction.     Meds: Percocet --due 12/04/2024; requests refills on meds and states that meds help with his pain; no misuse of meds and no opioid abuse;  reviewed and is appropriate; he is currently prescribed 30 mg of morphine equivalent meds/day      Nursing:   Pain Medication/Dose/Last Taken/# Taken  ms pump  oxycodone  pain level with medication is  4/10 and without is a 8/10     Is it helping? Yes  Physical Therapy  no Home Exercises  Is it helping? Yes     no New medical problems or surgeries  no New medications  no New allergies  no New antibiotics, fever, infection      Allergies:   Allergies Reviewed - 12/02/24 9:30:47 AM CST  Keflex       Current Medications:   Medications List Reviewed (12/02/24 9:30:42 AM CST)  Ketorolac Tromethamine Injection Solution 60 MG/2ML (11/2/2023) From 11/02/2023 11:15 AM to 11/02/2023 11:30 AM  dexAMETHasone Sodium Phosphate Injection Solution 4 MG/ML (11/2/2023) From 11/02/2023 11:15 AM to 11/02/2023 11:30 AM  Diclofenac Sodium External Gel 1 % (12/2/2024) Apply 2 gram applications three times a day for 30 day(s)  Cyclobenzaprine HCl Oral Tablet 10 MG (12/2/2024) Take 1 tablet twice a day as needed for 30 day(s)  Gabapentin Oral Tablet 600 MG (12/2/2024) Take 1 tablet three times a day for 30 day(s)  Vitamin D3 Oral Capsule 1.25 MG (29273 UT) (5/1/2024) Take 1 capsule once a week for 4 week(s)  Percocet Oral Tablet  MG (12/2/2024) Take 1 tablet twice a day as needed for 30  "day(s)  Testosterone Cypionate Intramuscular Solution 200 MG/ML (2024) Inject 1/2 milliliter IM every 72 hours  Morphine Sulfate Intramuscular Device 10 MG/0.7ML (2017) Morphine Pain Pump  Aspirin Oral Tablet Delayed Release 325 MG (2017) Take 1 tablet twice a week for 30 week(s)  Atorvastatin Calcium Oral Tablet 10 MG (2017) Take 1 tablet once a day for 30 day(s)      Previous Studies:  CT SCAN  Final Report  CT CTIC SPINE LUMBAR W/O CONTRAST    Show Printer-Friendly Version with Images (4 of 5)  Show Printer-Friendly Version without images Patient Name: Jesse Andrade   : Mar-   ID: [id number]([facility])   Study Date:  08:03             Studies- CTIC SPINE LUMBAR W/O CONTRAST Indications- Lumbar radiculopathy. Loose leads on pain pump Comparison- None. Technique- CT of the lumbar spine was performed without the administration of intravenous contrast. Axial, sagittal, and coronal series were submitted for interpretation. Findings- Alignment of the lumbar vertebral bodies is normal. Intervertebral disc spaces as well as vertebral body heights are well maintained throughout the lumbar spine. Facet joints have normal anatomic relationships and minimal degenerative change. No acute fractures are demonstrated. The imaged intra-abdominal and intrapelvic contents demonstrate no evidence of acute pathology. Visualized portion of the "lead" demonstrates no evidence of discontinuity. Multiple enlarged lymph nodes are demonstrated within the ileocolic mesentery. These measure 7 mm in short axis dimension. Additional enlarged lymph nodes in the paracaval region measuring up to 6-7 mm. Borderline bilateral intrapelvic lymph nodes measuring up to 8 mm short axis dimension are demonstrated. Conclusion- 1. Multiple intrapelvic and mesenteric lymph nodes are demonstrated which are borderline in size to minimally enlarged. When compared to additional imaging of the abdomen and pelvis performed " 2014, these lymph nodes appear to have minimally increased in size as well as number. Significance of these lymph nodes and relevant to patient's back pain is uncertain. Correlation recommended. 2. No significant abnormality of the lumbar spine is demonstrated. 3. No break in continuity of the demonstrated lead is present. 2016 8-30 AM Ordering physician-SALINAS SINGH MD Point of service- [facility]. This report has been electronically signed by Ryley Altman - MARYBEL Kaplan Radiologist- RYLEY ALTMAN II, M.D. Releasing Radiologist- RYLEY ALTMAN II, M.D. Released Date Time- 16 0845 ------------------------------------------------------------------------------     Signed by: Serena Weiner    Signed on:  08:30    [facility]  Impression:  1. no acute fracture or subluxation within the lumber spine  2. Mild degenerative disc disease and facet/unconvertebral arthropathy at L5/S1. Moderate symmetric bilateral neuroforaminal stenoses at L5/S1  3. Very mild to mild midline broad-based posterior intervertebral disc bulges from L3/L4-L5/S1 without high grade spinal canal stenosis    CT [facility] at [facility]  2023  Impression:  multilevel degenerative changes throughout the cervical spine; previous C5-C7 ACDF         ; X-RAY  Final Report  CR XR RIBS LEFT WITH PA/AP CHEST    Show Printer-Friendly Version Patient Name: Jesse Andrade   : Mar-   ID: [id number]([facility])   Study Date:  13:59             AP chest with left rib detail. Indication- Fall, with left chest wall pain. The heart size is normal. There is elevation of the right hemidiaphragm, stable finding. Surgical clips in the right upper quadrant. Postsurgical changes in the cervical spine. No pneumothorax. No pleural effusion. No rib fracture is seen. Impression- No acute abnormality. Place of service- [facility] This report has been electronically  signed by Mariah TIERNEY M.D. Releasing Dagmar TIERNEY M.D. Released Date Time- 18 1419 ------------------------------------------------------------------------------     Signed by: Husam, Generated    Signed on:  14:13  Final Report  CR CR SPINE CERVICAL AP AND LATERAL  Show Printer-Friendly Version Patient Name: Jesse Andrade    : Mar-    ID: [id number]([facility])    Study Date: Mar- 12:39       CR SPINE CERVICAL AP AND LATERAL    Indication- Cervicalgia    Comparison- Cervical spine x-ray Shirley 15, 2005    Technique- Frontal and lateral views of the cervical spine.    Findings-     There is straightening of normal cervical lordosis which may be  positional or secondary muscle spasm. There is no significant  anterolisthesis or retrolisthesis.  Anterior cervical fusion hardware at  C5-C7 with osseous fusion. Mild marginal osteophyte formation noted at  C3-4 and C4-5. The C7-T1 level is not well visualized on lateral view.  No gross evidence of significant vertebral body height loss.    IMPRESSION-    Degenerative change and malalignment of the cervical spine as detailed  above.Anterior cervical fusion hardware at C5-C7 with osseous fusion.       Point of Service- [facility]    This report has been electronically signed by Patrick BATRES D.O.       Released Date Time- 19 1409   ------------------------------------------------------------------------------    Signed by: Husam, Generated Signed on: Mar- 13:58  ; Findings  LAB results from Dr. Daley:  2018  Vitamin D  10.8  Aug 2018  PSA  0.989  Testosterone 255  Total Testosterone 226  Free Testosterone 4.75     C spine X-ray 2019      Past Medical History:   The patient has a past medical history of  Hypertension, High  Cholesterol, Osteoarthritis (OA), Joint Pain.  There is no past medical history of  Cardiac Pacer, Diabetes Mellitus type 1, Diabetes Mellitus type 2, Chronic Obstructive Pulmonary Disease, Rheumatoid Arthritis, Gastroesophageal Reflux Disease, Cerebrovascular Accident (CVA), Cardiovascular Disease, Alcoholism, Crohn's disease, Terminal Illness.   severe dementianutritional quality good      Social History:      Smoking Status: Light tobacco smoker; Last Reviewed: 2024            Pack-years: 1         Date quit smoking: 10 years ago      Alcohol use: Non-Drinker  Racial background:   Occupation: disabled  Marital status:   Patient knows the purpose/use of medications  Patient is taking medications as prescribed               Family History:   Father  History remarkable for  gun shot.   Age 22;       Mother  History remarkable for  Coronary Artery Disease.   Age 42;       Review of Systems:   General:  Patient denies  sweats, fatigue, fever, chills.  Ears, Nose and Throat:  Patient denies  hearing loss, ringing in the ears.  Cardiovascular:  Patient denies  chest pain.  Respiratory:  Patient denies  shortness of breath.  Gastrointestinal:  Patient denies  nausea, vomiting, diarrhea, constipation.  Genitourinary:  Patient denies  urinary frequency, prostate problems.  Endocrine:  Patient denies  thyroid problems.  Hematologic:  Patient denies  bleeding tendencies, easy bruising tendency.  Musculoskeletal:  Patient denies  joint pain, walking aids.  Neurologic  Patient denies  seizures, headache.  Psychologic:  Patient denies  anxiety, panic attacks, depression.  Skin:  Patient denies  skin rash.       DEPRESSION SCREENING:   Not at all the patient reports little interest or pleasure in doing things.  Not at all the patient reports feeling down, depressed, or hopeless.  Date Depression Screening Last Done: 2020   PHQ-2 Score: 0; PHQ-9 Score: incomplete   Several days  "the patient reports little interest or pleasure in doing things.  Several days the patient reports feeling down, depressed, or hopeless.  Date Depression Screening Last Done: 05/14/2019      Vital Signs:   Weight 256 lbs; Height 6 ft 4 in; BMI 31.2   12/02/2024 9:14 AM (CST)  Temperature 98.6 °F   12/02/2024 9:17 AM (CST)  Respiration Rate 18; Pulse Rate 70 bpm; Blood Pressure 170 / 99 mm/Hg; Pain Level: 7         Physical Examination:   Pre Anesthesia evaluation  Pre Op dx: lumbosacral spondylosis, disorder of sacrum  Planned procedure: Left SI joint injection   Age: 54  Ht: 6'4"  Wt: 237 lbs  BMI: 28.9  Allergies: Keflex  Meds/Labs/Test  Prior Surgeries:  Anesthesia complications: none known     Medical History  CNS: _X_Neg.   __Seizures  __CVA  __TIA  __HA  __Depression  Cardiac: __Neg  __CAD  __Stents  __MI  _X_HTN  __CHF  Pulmonary: __Neg  __COPD  __Asthma  __Sleep Apnea  __Smoker PPD  __CPAP  GI:_X_Neg.  __Reflux  __Liver Dysfunction  __Hepatitis  __Hiatal Hernia  __Hepatitis  __ETOH  __GERD   Renal:  _X_Neg.  __CRI  __ESRD  Endocrine:  _X_Neg.  __Thyroid  __Diabetes  Heme:  _X_Neg.   __Blood thinners  __other     Cranial Nerves II-XII grossly intact.  No apparent distress.  Patient is alert and oriented times three.  No somnolence or slurred speech.     Lumbar spine has pain with flexion and extension lateral rotation  Lumbar facets are tender to palpation  No focal neurologic deficits noted  physical exam essentially unchanged from previous   Back Motion:   Lumbar / lumbosacral spine abnormal.      Tenderness on Palpation:   Lumbosacral Spine:  There is tenderness on palpation of the  left sciatic notch moderate to severe in nature.      Other Examinations:   Left jethro-fabere test positive.   Gaenslen's Left Positive         Additional Physical Findings:  General general appearance normal appears comfortable,   Oriented to time, place and person, pleasant, seated  Not uncomfortable  Head normal head " exam  Eyes normal eye exam  Chest normal chest exam  Respiratory normal respiratory exam  Musculoskeletal abnormal low back pain and knee pain,   Joint tenderness  Posture normal  Neurologic normal neurologic exam  Skin normal skin exam       Toxicology Report   Toxicology was performed.   Reason for Toxicology:  A presumptive urine drug screen was done today to rapidly obtain and integrate results into clinical assessment and decision-making for ongoing safe prescribing of controlled substances.   Test Date/Time: 12/02/2024 00:00   Tested By: KONSTANTIN   Oxycodone  (OXY): Result = Positive; Control = Positive   Morphine  (OPI): Result = Positive; Control = Positive   Amphetamines  (AMP): Result = Negative; Control = Positive   Oxazepam  (BZO): Result = Negative; Control = Positive   Methadone  (MTD): Result = Negative; Control = Positive   Secobarbital  (BAR): Result = Negative; Control = Positive   Tricyclic Antidepressants  (TCA): Result = Negative; Control = Positive   Nortriptyline  (TCA): Result = Negative; Control = Positive   Marijuana-Carboxy Tetrahydrocannabinoid   (THC): Result = Negative; Control = Positive   Cocaine  (MEDARDO): Result = Negative; Control = Positive   Ecstasy-Methylenedioxymethamphetamine  (MDMA): Result = Negative; Control = Positive   D Methamphetamine  (MET): Result = Negative; Control = Positive   Phencyclidine  (PCP): Result = Negative; Control = Positive   Adulterants  (OX, SG, pH): Result = Negative; Control = Positive      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.1) - Testicular hypofunction  (M54.16) - Lumbar radiculopathy  (M47.812) - Cervical spondylosis without myelopathy  (M47.817) - Lumbosacral spondylosis  (M54.12) - Cervical radiculopathy  (M54.2) - Neck pain  (G89.4) - Chronic pain syndrome  (M53.3) - Disorder of sacrum      Plan:   Follow up visit 8 weeks      -refilled Percocet -- due 12/04/2024   -gave rx for  Brendon with hold date 01/03/2024  -scheduled Left SI joint injection #2 at [facility] on 12/18/2024 at 7:30 am   -refilled Testosterone with 2 RF -- due today  -request records from Dr. Graves   -Sent rx for Flexeril 10 mg TID, Gabapentin 600 mg TID  and Voltaren gel ([facility])  to [facility] with 5 RF (due again in June 2025)   -drink plenty of fluids and water  -increase fiber in diet  -call sooner with worsening pain  -pump refill was done on 08/28/2024  -next alarm date will be 02/21/2025  -refill Vit D 84833 unit weekly to [facility] with 5 RF  -will did receive cardiac clearance from Dr. Arteaga    -refer to Rheumatology for all over joint pain and myalgia         Monitored Anesthesia Care medical necessity authorization request:   Monitor anesthesia request is medically indicated for the scheduled _SI joint injection_______procedure due to:     - needle phobia and anxiety, placing the patient at risk during the provided service._YES____  - patient has a BMI greater than 45 ____  - patient has severe sleep apnea for which BiPAP and oxygen are needed while sleeping._____  - patient is unable to follow simple commands due to mental state.____  - patient has an ASA class greater than 3 and requires constant presence of an anesthesiologist/CRNA during the procedure.____  - patient has severe problems with muscles and muscle spasticity that makes it hard to lie still. ____  - patient suffers from chronic pain and is unable to function due to diminished ADL's._YES___  - patient is dependent on opioids or sedatives. _YES___   - Other __YES__        Patient will be scheduled for SI joint injection(s). We have discussed the need for two diagnostically sound procedures before the radiofrequency ablation of the dorsal root rami can be scheduled.        Indications for this procedure for this specific patient include the following:   - Pt has had symptoms for three months with moderate to severe pain with functional impairment rated  of  /10 pain.   - Pain non-responsive to conservative care.  - Pain is not associated with radiculopathy or claudication.  - No non-SI joint pathology as source of pain.  - Clinical assessment implicates SI joints as putative pain source.   - Pain is exacerbated by extension or prolonged sitting/standing and relieved by rest.   - No unexplained neurologic deficit.   - No history of coagulopathy , infection or unstable medical conditions.  - Pain is causing significant functional limitation resulting in diminished quality of life and impaired age appropriate ADL's.  - Repeat injections not done prior to 7 days.        NSAIDS Failure__YES___  Pain for 3months or >_YES____  Pain level 6> intermittent or continuous__YES__  Physical exam with documented signs that SI are the primary source of pain__YES__           NARCOTIC STATEMENT  Patient is taking the narcotic pain medications as prescribed. Refill is being given because of the benefit to the patient in regards to the pain. Patient has agreed not to abuse of medication and not to take it more than what is prescribed. The nature of the drug including the potential for addiction and dependency and abuse was also discussed with the patient. Patient has developed physical dependency for the narcotic pain medication for his pain relief.  Patient has also developed tolerance to the sedative effect of the narcotic pain medications.  Patient has decided to continue with these medications despite potential for addiction as described by this office.  This was stressed to the patient that it is the patient's responsibility to secure the narcotic medication and in any event of loss for any reason whatsoever,  there will be no refill before the next due date. Patient also understands that they are not supposed to drive or work on machinery while taking these medications.  Also explained to the patient that in the event of traffic citation, the presence of this drug in   bloodstream may result in DUI.  Patient has been advised not to drink alcohol while taking this medication.  Patient has verbalized understanding of our office policy and has signed a contract with us in this regard.                  Prescriptions Written Today:  Diclofenac Sodium External Gel 1 %  Apply 2 gram applications three times a day for 30 day(s)  Refills: 5  Rx quantity: 180,  Cyclobenzaprine HCl Oral Tablet 10 MG  Take 1 tablet twice a day as needed for 30 day(s)  Refills: 5  Rx quantity: 60,  Gabapentin Oral Tablet 600 MG  Take 1 tablet three times a day for 30 day(s)  Refills: 5  Rx quantity: 90,  Percocet Oral Tablet  MG  Take 1 tablet twice a day as needed for 30 day(s)  Refills: No Refills  Rx quantity: 60  Take 1 tablet twice a day as needed for 30 day(s)  Refills: No Refills  Rx quantity: 60,  Testosterone Cypionate Intramuscular Solution 200 MG/ML  Inject 1/2 milliliter IM every 72 hours  Refills: 2  Rx quantity: 10                 Mary Torres       Electronically signed: 12/2/2024 4:44:05 PM      Jaziel Pierson MD      Electronically signed: 12/4/2024 4:17:14 PM       Chief Complaint:      HPI:       Assessment/Plan:         Jaziel Pierson MD  Pain Management  [facility]

## 2024-12-18 NOTE — OP NOTE
12/18/2024  Jesse Andrade 1970    PREOPERATIVE DIAGNOSIS:     Lumbosacral Spondylosis without myelopathy                                                         Disorder of Sacrum                                                         Low Back Pain  POSTOPERATIVE DIAGNOSIS:   Lumbosacral Spondylosis without myelopathy                                                         Disorder of Sacrum                                                         Low Back Pain     PROCEDURE:  Left Sacroiliac Joint Injection under Direct Fluoroscopic Guidance.     COMPLICATIONS:  None  DRAINS AND PACKS:  None  ANESTHESIA:   MAC  BLOOD LOSS:  None     The patient was identified in the holding area.  The risks and benefits of the procedure were again explained to the patient.  The patient agreed to proceed. The patients left sacroiliac joint was marked with a skin pen and consent form was signed and obtained.  The patient was taken in stable condition to the operating room and placed in prone position on the C-Arm table.  All pressure points were checked and padded comfortably while the patient was awake.    Anesthesia was initiated.  The patients lower lumbar area overlying the left sacroiliac joint was prepped and draped in the usual sterile fashion.  The C-Arm was then brought into the true AP position to visualize the sacroiliac joint.  Under direct fluoroscopic guidance the C-Arm was oblique to identify the left sacroiliac joint.  The skin wheal was raised with 3 ccs of 0.25 % Bupivacaine(2.5mg/ml) over the targeted area and 22 gauge 31/2inch spinal needles was advanced under direct fluoroscopic guidance into the joint space.  There was negative aspiration of heme and CSF. 1cc Isovue M 300 was injected intra-articularly to delineate the joint space. A 2.5 ccs allotment of solution that contained Iaupqse12cs/ml 1ml and 0.25% Bupivacaine(2.5mg/ml)  was injected without complications.   The patient tolerated the procedure  well.  Adequate hemostasis was achieved by holding pressure over the injection sites.  The patient had a band-aid placed over the puncture wound and was taken in stable condition to the holding area and was monitored for the appropriate time of convalescence.      Preoperative pain score was      9/10.   Postoperative pain score was a  /10.

## 2024-12-18 NOTE — ANESTHESIA PREPROCEDURE EVALUATION
12/18/2024  Jesse nAdrade is a 54 y.o., male.    Past Medical History:   Diagnosis Date    Arthritis     Hyperlipidemia     Hypertension     Sleep apnea        Past Surgical History:   Procedure Laterality Date    CERVICAL SPINE SURGERY      fusion    EPIDURAL STEROID INJECTION INTO CERVICAL SPINE Right 6/7/2023    Procedure: INJECTION, STEROID, SPINE, CERVICAL, EPIDURAL;  Surgeon: Jaziel Pierson MD;  Location: [facility];  Service: Pain Management;  Laterality: Right;  C6-7 cath guided ARTHUR with right bias    EPIDURAL STEROID INJECTION INTO CERVICAL SPINE Left 10/25/2023    Procedure: INJECTION, STEROID, SPINE, CERVICAL, EPIDURAL;  Surgeon: Jaziel Pierson MD;  Location: [facility];  Service: Pain Management;  Laterality: Left;  Left C6-7 cath guided ARTHUR    FACIAL FRACTURE SURGERY      INJECTION OF ANESTHETIC AGENT AROUND MEDIAL BRANCH NERVES INNERVATING CERVICAL FACET JOINT Bilateral 12/20/2023    Procedure: BLOCK, NERVE, FACET JOINT, CERVICAL, MEDIAL BRANCH;  Surgeon: Jaziel Pierson MD;  Location: [facility];  Service: Pain Management;  Laterality: Bilateral;    INJECTION OF ANESTHETIC AGENT AROUND MEDIAL BRANCH NERVES INNERVATING CERVICAL FACET JOINT Bilateral 4/17/2024    Procedure: BLOCK, NERVE, FACET JOINT, CERVICAL, MEDIAL BRANCH;  Surgeon: Jaziel Pierson MD;  Location: [facility];  Service: Pain Management;  Laterality: Bilateral;  Torey C5-7 MBNB    INJECTION OF ANESTHETIC AGENT AROUND MEDIAL BRANCH NERVES INNERVATING LUMBAR FACET JOINT Bilateral 10/26/2022    Procedure: BLOCK, NERVE, FACET JOINT, LUMBAR, MEDIAL BRANCH;  Surgeon: Jaziel Pierson MD;  Location: [facility];  Service: Pain Management;  Laterality: Bilateral;  Bilateral L2-5 MBNB    INJECTION OF ANESTHETIC AGENT INTO SACROILIAC JOINT Left 8/14/2024     Please call to triage.  We do not give refills on antibiotics generally.     Madelyn Preston D.O.     Procedure: BLOCK, SACROILIAC JOINT;  Surgeon: Jaziel Pierson MD;  Location: [facility];  Service: Pain Management;  Laterality: Left;  Left SI JI    RADIOFREQUENCY ABLATION OF LUMBAR MEDIAL BRANCH NERVE AT SINGLE LEVEL Right 12/14/2022    Procedure: RADIOFREQUENCY ABLATION, NERVE, SPINAL, LUMBAR, MEDIAL BRANCH, 1 LEVEL;  Surgeon: Jaziel Pierson MD;  Location: [facility];  Service: Pain Management;  Laterality: Right;  Right L3-5 RFTC    RADIOFREQUENCY ABLATION OF LUMBAR MEDIAL BRANCH NERVE AT SINGLE LEVEL Bilateral 7/26/2023    Procedure: RADIOFREQUENCY ABLATION, NERVE, SPINAL, LUMBAR, MEDIAL BRANCH, 1 LEVEL;  Surgeon: Jaziel Pierson MD;  Location: [facility];  Service: Pain Management;  Laterality: Bilateral;  Bilateral L3-5 RFTC    RADIOFREQUENCY ABLATION OF LUMBAR MEDIAL BRANCH NERVE AT SINGLE LEVEL Bilateral 5/22/2024    Procedure: RADIOFREQUENCY ABLATION, NERVE, SPINAL, LUMBAR, MEDIAL BRANCH, 1 LEVEL;  Surgeon: Jaziel Pireson MD;  Location: [facility];  Service: Pain Management;  Laterality: Bilateral;  [place]y L3-5 RFTC    REFILL PAIN PUMP N/A 4/14/2021    Procedure: REFILLING, ANALGESIC PUMP;  Surgeon: Jaziel Pierson MD;  Location: [facility];  Service: Pain Management;  Laterality: N/A;  pump refill    REFILL PAIN PUMP N/A 10/27/2021    Procedure: REFILLING, ANALGESIC PUMP;  Surgeon: Jaziel Pierson MD;  Location: [facility];  Service: Pain Management;  Laterality: N/A;  Pump refill    REFILL PAIN PUMP N/A 4/13/2022    Procedure: REFILLING, ANALGESIC PUMP;  Surgeon: Jaziel Pierson MD;  Location: [facility];  Service: Pain Management;  Laterality: N/A;  Pump refill    REFILL PAIN PUMP N/A 10/5/2022    Procedure: REFILLING, ANALGESIC PUMP;  Surgeon: Jaziel Pierson MD;  Location: [facility];  Service: Pain Management;  Laterality: N/A;  Pump refill    REFILL PAIN PUMP N/A 3/29/2023    Procedure:  REFILLING, ANALGESIC PUMP;  Surgeon: Jaziel Pierson MD;  Location: [facility];  Service: Pain Management;  Laterality: N/A;  Pump refill    REFILL PAIN PUMP N/A 9/20/2023    Procedure: REFILLING, ANALGESIC PUMP;  Surgeon: Jaziel Pierson MD;  Location: [facility];  Service: Pain Management;  Laterality: N/A;  pump refill    REFILL PAIN PUMP N/A 3/6/2024    Procedure: REFILLING, ANALGESIC PUMP;  Surgeon: Jaziel Pierson MD;  Location: [facility];  Service: Pain Management;  Laterality: N/A;  Pump refill    REFILL PAIN PUMP N/A 8/28/2024    Procedure: REFILLING, ANALGESIC PUMP;  Surgeon: Jaziel Pierson MD;  Location: [facility];  Service: Pain Management;  Laterality: N/A;  pump refill    TOTAL KNEE ARTHROPLASTY Bilateral        Family History   Problem Relation Name Age of Onset    Coronary artery disease Mother         Social History     Socioeconomic History    Marital status:    Tobacco Use    Smoking status: Former     Current packs/day: 0.75     Average packs/day: 0.8 packs/day for 10.0 years (7.5 ttl pk-yrs)     Types: Cigarettes     Passive exposure: Past    Smokeless tobacco: Current     Types: Snuff   Substance and Sexual Activity    Alcohol use: Never       Current Facility-Administered Medications   Medication Dose Route Frequency Provider Last Rate Last Admin    0.9% NaCl infusion  500 mL Intravenous Continuous Jaziel Pierson MD           Review of patient's allergies indicates:   Allergen Reactions    Keflex [cephalexin]         Pre-op Assessment    I have reviewed the Patient Summary Reports.     I have reviewed the Nursing Notes. I have reviewed the NPO Status.   I have reviewed the Medications.     Review of Systems  Anesthesia Hx:  No problems with previous Anesthesia                Social:  Dip/Chew, Former Smoker       Cardiovascular:     Hypertension                                    Hypertension         Pulmonary:         Sleep Apnea     Obstructive Sleep Apnea (THEO).           Musculoskeletal:  Arthritis               Neurological:        Chronic Pain Syndrome                         Endocrine:        Obesity / BMI > 30         Anesthesia Plan  Type of Anesthesia, risks & benefits discussed:    Anesthesia Type: MAC  Intra-op Monitoring Plan: Standard ASA Monitors  Post Op Pain Control Plan: IV/PO Opioids PRN  Induction:  IV  Informed Consent: Informed consent signed with the Patient and all parties understand the risks and agree with anesthesia plan.  All questions answered. Patient consented to blood products? Yes  ASA Score: 3  Day of Surgery Review of History & Physical: H&P Update referred to the surgeon/provider.I have interviewed and examined the patient. I have reviewed the patient's H&P dated: There are no significant changes.     Ready For Surgery From Anesthesia Perspective.     .

## 2024-12-18 NOTE — TRANSFER OF CARE
Anesthesia Transfer of Care Note    Patient: Jesse Andrade    Procedure(s) Performed: Procedure(s) (LRB):  BLOCK, SACROILIAC JOINT (Left)    Patient location: Other: [facility]    Anesthesia Type: general    Transport from OR: Transported from OR on room air with adequate spontaneous ventilation    Post pain: adequate analgesia    Post assessment: no apparent anesthetic complications    Post vital signs: stable    Level of consciousness: sedated and responds to stimulation    Nausea/Vomiting: no nausea/vomiting    Complications: none    Transfer of care protocol was followedComments: Good SV continue, NAD noted, VSS, RTRN    Last vitals: Visit Vitals  BP (!) 158/77   Pulse 89   Temp 36.2 °C (97.2 °F)   Resp 19   SpO2 99%

## 2024-12-18 NOTE — DISCHARGE SUMMARY
Name: Leah Lora      : 1964      MRN: [id number]  Encounter Provider: Adriana Cevallos MD  Encounter Date: 2024   Encounter department: [facility]    Assessment & Plan     1. Pre-op examination  Assessment & Plan:  Patient is here for preop errands for right shoulder surgery. She is medically cleared for same    Orders:  -     POCT ECG  -     CBC and differential; Future  -     CBC and differential    2. Shoulder impingement, right  Assessment & Plan:  She has been having increasing pain and decreased range of motion from right shoulder impingement      3. Hyperlipidemia, unspecified hyperlipidemia type  -     Comprehensive metabolic panel; Future  -     Lipid panel; Future  -     Comprehensive metabolic panel  -     Lipid panel    4. Class 2 severe obesity due to excess calories with serious comorbidity and body mass index (BMI) of 35.0 to 35.9 in adult ([facility])  Assessment & Plan:  Patient remains significantly obese with the complications of type II diabetes      5. Type 2 diabetes mellitus without complication, without long-term current use of insulin ([facility])  Assessment & Plan:    Lab Results   Component Value Date    HGBA1C 7.0 (H) 2024   she is a type II diabetic and prior to this A1c she was running in the sixes. He still stable for surgery. Possibly her steroids have made her number go up    Orders:  -     Comprehensive metabolic panel; Future  -     Comprehensive metabolic panel    6. Post-menopause  -     DXA bone density spine hip and pelvis; Future; Expected date: 2024           Subjective      Patient is here for clearance for right shoulder surgery. She is diabetic and does have hyperlipidemia. Her A1c is relatively stable and her EKG is unchanged from previously.      Review of Systems   Constitutional:  Negative for chills and fever.   HENT:  Negative for ear pain and sore throat.    Eyes:  Negative for pain and visual disturbance.   Respiratory:  Negative for  Patient underwent    Left Sacroiliac Joint Injection under Direct Fluoroscopic Guidance.   procedure 12/18/2024. The pt will follow up in clinic. Discharged home. Discharge Dx: Lumbosacral Spondylosis without myelopathy    cough and shortness of breath.    Cardiovascular:  Negative for chest pain and palpitations.   Gastrointestinal:  Negative for abdominal pain and vomiting.   Genitourinary:  Negative for dysuria and hematuria.   Musculoskeletal:  Negative for arthralgias and back pain.        Right shoulder pain   Skin:  Negative for color change and rash.   Neurological:  Negative for seizures, syncope and weakness.   Hematological:  Does not bruise/bleed easily.   All other systems reviewed and are negative.      Current Outpatient Medications on File Prior to Visit   Medication Sig    ALPRAZolam (XANAX) 0.5 mg tablet Take 1 tablet (0.5 mg total) by mouth 30 min pre-procedure Take 2nd dose 30 mins after 1st dose if still anxious    Blood Glucose Monitoring Suppl (OneTouch Verio Reflect) w/Device KIT Check blood sugars once daily. Please substitute with appropriate alternative as covered by patient's insurance. Dx: E11.65    glucose blood (OneTouch Verio) test strip USE AS DIRECTED DAILY    lisinopril (ZESTRIL) 2.5 mg tablet Take 1 tablet (2.5 mg total) by mouth daily    OneTouch Delica Lancets 33G MISC Check blood sugars once daily. Please substitute with appropriate alternative as covered by patient's insurance. Dx: E11.65    rosuvastatin (CRESTOR) 10 MG tablet Take 1 tablet (10 mg total) by mouth daily    semaglutide, 0.25 or 0.5 mg/dose, (Ozempic, 0.25 or 0.5 MG/DOSE,) 2 mg/3 mL injection pen Inject 0.375 mL (0.25 mg total) under the skin every 7 days for 30 days, THEN 0.75 mL (0.5 mg total) every 7 days for 30 days, THEN 1.5 mL (1 mg total) every 7 days.    SUMAtriptan (IMITREX) 100 mg tablet Take 1 tablet (100 mg total) by mouth once as needed for migraine for up to 1 dose    [DISCONTINUED] cyclobenzaprine (FLEXERIL) 10 mg tablet Take 1 tablet (10 mg total) by mouth daily at bedtime    [DISCONTINUED] ibuprofen (MOTRIN) 800 mg tablet Take 1 tablet (800 mg total) by mouth 3 (three) times a day as needed for moderate pain  "      Objective     /70 (BP Location: Right arm, Patient Position: Sitting, Cuff Size: Large)   Pulse 84   Temp 98.7 °F (37.1 °C) (Tympanic)   Resp 16   Ht 5' 4\" (1.626 m)   Wt 91.6 kg (202 lb)   SpO2 97%   BMI 34.67 kg/m²     Physical Exam  Vitals reviewed.   Constitutional:       General: She is not in acute distress.     Appearance: She is well-developed. She is obese.   HENT:      Head: Normocephalic.      Right Ear: External ear normal.      Left Ear: External ear normal.      Nose: Nose normal.      Mouth/Throat:      Pharynx: No oropharyngeal exudate.   Eyes:      Pupils: Pupils are equal, round, and reactive to light.   Neck:      Thyroid: No thyromegaly.      Vascular: No JVD.   Cardiovascular:      Rate and Rhythm: Normal rate and regular rhythm.      Heart sounds: Normal heart sounds. No murmur heard.     No gallop.   Pulmonary:      Effort: Pulmonary effort is normal. No respiratory distress.      Breath sounds: Normal breath sounds. No wheezing or rales.   Abdominal:      General: Bowel sounds are normal. There is no distension.      Palpations: Abdomen is soft. There is no mass.      Tenderness: There is no abdominal tenderness.   Musculoskeletal:         General: No tenderness. Normal range of motion.      Cervical back: Normal range of motion and neck supple.      Comments: Pain with movement of right shoulder   Lymphadenopathy:      Cervical: No cervical adenopathy.   Skin:     Findings: No rash.   Neurological:      General: No focal deficit present.      Mental Status: She is alert and oriented to person, place, and time.      Cranial Nerves: No cranial nerve deficit.      Coordination: Coordination normal.   Psychiatric:         Behavior: Behavior normal.         Thought Content: Thought content normal.         Judgment: Judgment normal.       Adriana Cevallos MD    "

## 2024-12-31 ENCOUNTER — EXTERNAL CHRONIC CARE MANAGEMENT (OUTPATIENT)
Dept: FAMILY MEDICINE | Facility: CLINIC | Age: 54
End: 2024-12-31
Payer: MEDICARE

## 2024-12-31 PROCEDURE — G0511 CCM/BHI BY RHC/FQHC 20MIN MO: HCPCS | Mod: ,,, | Performed by: FAMILY MEDICINE

## 2025-01-29 RX ORDER — NAPROXEN SODIUM 220 MG/1
TABLET, FILM COATED ORAL
Qty: 90 TABLET | Refills: 3 | Status: SHIPPED | OUTPATIENT
Start: 2025-01-29

## 2025-01-29 RX ORDER — LISINOPRIL 40 MG/1
TABLET ORAL
Qty: 90 TABLET | Refills: 3 | Status: SHIPPED | OUTPATIENT
Start: 2025-01-29

## 2025-01-31 ENCOUNTER — EXTERNAL CHRONIC CARE MANAGEMENT (OUTPATIENT)
Dept: FAMILY MEDICINE | Facility: CLINIC | Age: 55
End: 2025-01-31
Payer: MEDICARE

## 2025-01-31 PROCEDURE — G0511 CCM/BHI BY RHC/FQHC 20MIN MO: HCPCS | Mod: ,,, | Performed by: FAMILY MEDICINE

## 2025-02-19 ENCOUNTER — HOSPITAL ENCOUNTER (OUTPATIENT)
Facility: HOSPITAL | Age: 55
Discharge: HOME | End: 2025-02-19
Attending: ANESTHESIOLOGY | Admitting: ANESTHESIOLOGY
Payer: MEDICARE

## 2025-02-19 VITALS
RESPIRATION RATE: 16 BRPM | OXYGEN SATURATION: 98 % | HEIGHT: 76 IN | BODY MASS INDEX: 30.69 KG/M2 | HEART RATE: 71 BPM | DIASTOLIC BLOOD PRESSURE: 101 MMHG | SYSTOLIC BLOOD PRESSURE: 154 MMHG | WEIGHT: 252 LBS

## 2025-02-19 DIAGNOSIS — G89.4 CHRONIC PAIN SYNDROME: ICD-10-CM

## 2025-02-19 PROCEDURE — 62370 ANL SP INF PMP W/MDREPRG&FIL: CPT | Performed by: ANESTHESIOLOGY

## 2025-02-19 PROCEDURE — 63600175 PHARM REV CODE 636 W HCPCS: Performed by: ANESTHESIOLOGY

## 2025-02-19 RX ADMIN — MORPHINE SULFATE: 10 INJECTION, SOLUTION EPIDURAL; INTRATHECAL at 11:02

## 2025-02-19 NOTE — DISCHARGE SUMMARY
Patient underwent Intrathecal pump refill and reprogramming    procedure 02/19/2025. The pt will follow up in clinic. Discharged home. Discharge Dx:  Chronic pain syndrome

## 2025-02-19 NOTE — OP NOTE
02/19/2025  Jesse Andrade 1970  PREOPERATIVE DIAGNOSIS:     Chronic pain syndrome           POSTOPERATIVE DIAGNOSIS:  Chronic pain syndrome                                                             PROCEDURE:  Intrathecal pump refill and reprogramming        SURGEON: Dr Jaziel Pierson              The patient was identified in the exam room and the pump was interrogated using the Medtronic system.  The pump was noted to have 2.5 cc remaining of a solution of morphine 10 mg per mL.  The patient's abdomen was marked in the appropriate location and prepped and draped in usual sterile fashion.  A 40 mL Medtronic pump refill kit was utilized for this procedure.  After the patient abdomen was prepped and draped in the usual sterile fashion, the Medtronic 40 mL template was placed on the skin overlying the target area of the pump orifice.  A 22-gauge noncoring needle was then utilized to access the pump.  A 20 mL syringe was utilized to aspirate the contents of the pump. Actual removed 4.0cc.   The solution was clear with no signs of serous fluid or blood. The patient then had a 40 mL allotment of morphine 10 mg per mL injected incrementally using the syringe and filter.  The needle was removed with its tip intact and the patient tolerated the procedure well with no adverse events.  The pump was then reprogrammed using the Medtronic system. Next fill on/before 08/15/2025

## 2025-02-19 NOTE — H&P
"[facility] - Pain Management  Pain Management  H&P    Patient Name: Jesse Andrade  MRN: [id number]  Admission Date: 2025  Primary Care Provider: Otilio Vargas MD    Patient information was obtained from     Subjective:     Principal Problem:  Mary Torres FNP  [address]  [place] [address]  [phone]                   RE: Jesse Andrade      : 1970   Date of Service: 2025   Procedure Follow-Up Left SI Joint injection pre 9 post 5 with    80% relief   Existing Patient           Chief Complaint:   Neck pain described as aching, constant, dull, sharp; Location bilaterally, at the midline, radiating to shoulder(s); aggravated by activity, poor posture; relieved by narcotics, rest-lying down; onset gradual, constant; reported as 6/10 on pain scale,  Back pain achy in nature, constant, dull; located in the lumbar region, in the midline; aggravated by activity, standing, bending, lifting, walking, weather/temperature change; relieved by analgesics, rest; gradual in occurrence; pain rated as 4/10 on the pain scale,  Knee pain Location right patella; characterized as aching aggravated by walking Timing constant Severity moderate to severe.   Patient seated in room 4 with c/o neck and right hip pain, reports pain is unchanged since last visit          [place] Prescription Monitoring Program data was reviewed for this patient for the past 12 calendar months to ascertain any current, or past use of scheduled medications.      History of Present Illness:   What part of the body? neck and right hip   Pain level at best 3; Pain level at worst 6; Pain level at present 7; Pain level on average 6   53 y/o BM with complaints of "low back pain and Right knee pain"; objective data essentially unchanged from previous visit; states that pain improved by 80% after Left SI joint injection #2 that lasted for several days; states that pain improved by 80% after Bilateral L4-L5 RFTC in " May 2024 but is now having more lower back pain and is ready to have his RFTC; he was having more pain to Left SI Joint injection that improved pain by 80% that lasted for a couple of days; prior to last visit, he was playing softball and fell while he was trying to catch a fly ball and fractured 2 ribs that caused a pneumothorax but has completely healed and is ready to have injection to help with Left SI joint injection; he was also able to see Dr. Graves for abnormal PSA and was told everything was ok and can restart testosterone; he is also complaining of generalized myalgia; he was able to have his Bilateral C5-C7 MBNB #2 that improved pain by 100% for several days but is now hurting more but defers neck procedures for now; his previous lumbar RFTC was July 2023 that improved by 80% and has lasted for over 9 months; also states that gabapentin helps when he takes it but does not last 12 hrs-we will increase to TID dosing; he had his Pump refill in March; he has driven to [place] for a death in the family; he is having more neck pain and pain to both shoulders; states that pain improved by 75% after Bilateral C3-C5 MBNB #1 and is still doing good with pain relief; he is still doing good with numbness and tingling and has been able to sleep better; his last cervical ARTHUR helped with numbness to LUE but MBNB helped pain to neck; he was able to have his pump refilled and will be due again in March; he is still having more left side neck pain with numbness to fingers; states that pain improved by 80% after Bilateral L4-L5 RFTC and still continues to do well with lower back pain relief; states that pain improved by 85% after cervical ARTHUR but will occasionally have some LUE tingling that lasted for several weeks but is now ready to have an additional injection to help with neck pain without radicular symptoms; his last cervical RF was June 2022; he was able to have his Lumbar RFTC done in Dec but only did the Right  side because he took his ASA that morning; he actually had about 80% improvement of pain; pain is only worse with increased movements and first in the morning but improves throughout the day; he has already had his pump refill on 10/05 as well as #2 MBNB that improved by 85% that lasted for a couple of weeks; states that pain improved by 90% that lasted about 2 weeks after Bilateral L2-L5 MBNB #1; he has just recently had his Cervical RFTC in June that has improved his pain and is not having much pain at all to his cervical spine; states that pain improved by 80% after Left C3-C7 RFTC in Nov 2021; overall, pain has been about the same except for some worsening lower back pain; states that pain improved by 80% after Bilateral C3-C7 MBNB that lasted for about a week; pain does get better with movement but tingling to his fingers has improved; we will consider ARTHUR on return if tingling persists/returns but this has improved since last procedure; pain improved by 85% after Bilateral L3-L5 MBNB in 09/2021 that lasted for several months and is worse now with bending and moving; he was able to have his CT Lspine 01/2021 that revealed some changes but the last injection helped with pain; he was also able to have his pump refilled in Oct and will be due again in April;  states that he has started walking some but causes worsening pain; states that pain improved by 75% after Bilateral C3-C7 MBNB in Dec that lasted for several months but pain is slowly returning and is ready to schedule a neck injection to help with radicular symptoms at this time; denies any radiation of pain into legs and no numbness or tingling to upper or lower extremities; exercise has helped some but pain is still there; states that neck pain has been worse the past several months that wakes him up at night; he is now having more pain that prevents sleeping and from doing every day activity; states that pain improved by 75% after his Bilateral C3-C7 RFTC  that lasted for several months; states that pain improved after Bilateral C3-C7 #1 about 80% and then has had a 80% improvement in pain after his Bilateral C3-C7 FI#2 as well as had 50% improvement of ADL's; He has been going to physical therapy after having a R knee replacement in Jan 2019 by Dr. Garcia and has had more pain to knee but Percocet is helping better with that pain as well; he is still exercising some but continues to have pain and swelling; usually ice, rest and meds help with this; he sees Dr. Garcia again in July 2020 but feels like it is not healing as well as it should; he has been going to the gym to help with pain and strengthening; states he has been doing well and is better today since last visit after having his last injection; He describes the pain as dull pain that is constant; he has also started taking testosterone injections at home but will have an appt with Dr. Graves hopefully in Aug; denies any issues with constipation; He has been experiencing some tingling and numbness to his R thigh; he started Neurontin 300mg BID since last visit and states it is helping with the numbness and tingling sensation and request to continue with 600 mg daily; also states that the Flexeril has helped better with muscle spasms     UDS: consistent x 22; inconsistent x 3 (no percocet, +Norco that he had left over from the past but instructed that this can not happen again)   The previous urine drug screen was evaluated, and it was compliant for the medications that has been prescribed. A presumptive urine drug screen was done today to rapidly obtain and integrate results into clinical assessment and decision-making for ongoing safe prescribing of controlled substances. The results of the presumptive UDS done today was positive for opiates. He is prescribed oxycodone. Because presumptive UDS positive results are not definitive due to sensitivity and specificity and cross reactivity limitations and negative  results do not necessarily indicate absence of drugs or substances in the urine specimen, confirmation will identify specific prescribed and non-prescribed medications or illicit use for ongoing safe prescribing of controlled substances including benzodiazepines, opioid agonist, opioids antagonist, partial agonist, stimulants, muscle relaxers, antidepressants, sleep aids, anti-seizure medicine, and alcohol. Urine drug analysis is used to assist with diagnosis and therapeutic decision-making concerning pretreatment assessment. Intensity and frequency of monitoring with urine drug testing will be based on the risk stratification method in determining risk level for opioid addiction.     Meds: Percocet --due 02/02/2025; requests refills on meds and states that meds help with his pain; no misuse of meds and no opioid abuse;  reviewed and is appropriate; he is currently prescribed 30 mg of morphine equivalent meds/day      Nursing:   Pain Medication/Dose/Last Taken/# Taken  ms pump  oxycodone  pain level with medication is  4/10 and without is a 8/10     Is it helping? Yes  Physical Therapy  no Home Exercises  Is it helping? Yes     no New medical problems or surgeries  no New medications  no New allergies  no New antibiotics, fever, infection      Allergies:   Allergies Reviewed - 01/14/25 9:23:31 AM CST  Keflex       Current Medications:   Medications List Reviewed (01/14/25 9:23:28 AM CST)  Ketorolac Tromethamine Injection Solution 60 MG/2ML (11/2/2023) From 11/02/2023 11:15 AM to 11/02/2023 11:30 AM  dexAMETHasone Sodium Phosphate Injection Solution 4 MG/ML (11/2/2023) From 11/02/2023 11:15 AM to 11/02/2023 11:30 AM  Diclofenac Sodium External Gel 1 % (12/2/2024) Apply 2 gram applications three times a day for 30 day(s)  Cyclobenzaprine HCl Oral Tablet 10 MG (12/2/2024) Take 1 tablet twice a day as needed for 30 day(s)  Gabapentin Oral Tablet 600 MG (12/2/2024) Take 1 tablet three times a day for 30  "day(s)  Vitamin D3 Oral Capsule 1.25 MG (05546 UT) (2024) Take 1 capsule once a week for 4 week(s)  Percocet Oral Tablet  MG (2025) Take 1 tablet twice a day as needed for 30 day(s)  Testosterone Cypionate Intramuscular Solution 200 MG/ML (2024) Inject 1/2 milliliter IM every 72 hours  Morphine Sulfate Intramuscular Device 10 MG/0.7ML (2017) Morphine Pain Pump  Aspirin Oral Tablet Delayed Release 325 MG (2017) Take 1 tablet twice a week for 30 week(s)  Atorvastatin Calcium Oral Tablet 10 MG (2017) Take 1 tablet once a day for 30 day(s)      Previous Studies:  CT SCAN  Final Report  CT CTIC SPINE LUMBAR W/O CONTRAST    Show Printer-Friendly Version with Images (4 of 5)  Show Printer-Friendly Version without images Patient Name: Jesse Andrade   : Mar-   ID: [id number]([facility])   Study Date:  08:03             Studies- CTIC SPINE LUMBAR W/O CONTRAST Indications- Lumbar radiculopathy. Loose leads on pain pump Comparison- None. Technique- CT of the lumbar spine was performed without the administration of intravenous contrast. Axial, sagittal, and coronal series were submitted for interpretation. Findings- Alignment of the lumbar vertebral bodies is normal. Intervertebral disc spaces as well as vertebral body heights are well maintained throughout the lumbar spine. Facet joints have normal anatomic relationships and minimal degenerative change. No acute fractures are demonstrated. The imaged intra-abdominal and intrapelvic contents demonstrate no evidence of acute pathology. Visualized portion of the "lead" demonstrates no evidence of discontinuity. Multiple enlarged lymph nodes are demonstrated within the ileocolic mesentery. These measure 7 mm in short axis dimension. Additional enlarged lymph nodes in the paracaval region measuring up to 6-7 mm. Borderline bilateral intrapelvic lymph nodes measuring up to 8 mm short axis dimension are demonstrated. Conclusion- 1. " Multiple intrapelvic and mesenteric lymph nodes are demonstrated which are borderline in size to minimally enlarged. When compared to additional imaging of the abdomen and pelvis performed 2014, these lymph nodes appear to have minimally increased in size as well as number. Significance of these lymph nodes and relevant to patient's back pain is uncertain. Correlation recommended. 2. No significant abnormality of the lumbar spine is demonstrated. 3. No break in continuity of the demonstrated lead is present. 2016 8-30 AM Ordering physician-SALINAS SINGH MD Point of service- [facility]. This report has been electronically signed by Ryley Altman - MARYBEL Kaplan Radiologist- RYLEY ALTMAN II, M.D. Releasing Radiologist- RYLEY ALTMAN II, M.D. Released Date Time- 16 0845 ------------------------------------------------------------------------------     Signed by: Serena Weiner    Signed on:  08:30    [facility]  Impression:  1. no acute fracture or subluxation within the lumber spine  2. Mild degenerative disc disease and facet/unconvertebral arthropathy at L5/S1. Moderate symmetric bilateral neuroforaminal stenoses at L5/S1  3. Very mild to mild midline broad-based posterior intervertebral disc bulges from L3/L4-L5/S1 without high grade spinal canal stenosis    CT [facility]  2023  Impression:  multilevel degenerative changes throughout the cervical spine; previous C5-C7 ACDF         ; X-RAY  Final Report  CR XR RIBS LEFT WITH PA/AP CHEST    Show Printer-Friendly Version Patient Name: Jesse Andrade   : Mar-   ID: [id number]([facility])   Study Date:  13:59             AP chest with left rib detail. Indication- Fall, with left chest wall pain. The heart size is normal. There is elevation of the right hemidiaphragm, stable finding. Surgical clips in the right upper quadrant. Postsurgical changes in the  cervical spine. No pneumothorax. No pleural effusion. No rib fracture is seen. Impression- No acute abnormality. Place of service- [facility] This report has been electronically signed by Mariah Kaplan Physician- MARIAH TIERNEY M.D. Released Date Time- 18 1419 ------------------------------------------------------------------------------     Signed by: Serena Weiner    Signed on:  14:13  Final Report  CR CR SPINE CERVICAL AP AND LATERAL  Show Printer-Friendly Version Patient Name: Jesse Andrade    : Mar-    ID: [id number]([facility])    Study Date: Mar- 12:39       CR SPINE CERVICAL AP AND LATERAL    Indication- Cervicalgia    Comparison- Cervical spine x-ray Shirley 15, 2005    Technique- Frontal and lateral views of the cervical spine.    Findings-     There is straightening of normal cervical lordosis which may be  positional or secondary muscle spasm. There is no significant  anterolisthesis or retrolisthesis.  Anterior cervical fusion hardware at  C5-C7 with osseous fusion. Mild marginal osteophyte formation noted at  C3-4 and C4-5. The C7-T1 level is not well visualized on lateral view.  No gross evidence of significant vertebral body height loss.    IMPRESSION-    Degenerative change and malalignment of the cervical spine as detailed  above.Anterior cervical fusion hardware at C5-C7 with osseous fusion.       Point of Service- [facility]    This report has been electronically signed by Santhosh Kaplan Physician- SANTHOSH BATRES D.O.       Released Date Time- 19 1409   ------------------------------------------------------------------------------    Signed by: Serena Weiner Signed on: Mar- 13:58  ; Findings  LAB results from Dr. Daley:  2018  Vitamin D  10.8  Aug 2018  PSA   0.989  Testosterone 255  Total Testosterone 226  Free Testosterone 4.75     C spine X-ray 2019      Past Medical History:   The patient has a past medical history of  Hypertension, High Cholesterol, Osteoarthritis (OA), Joint Pain.  There is no past medical history of  Cardiac Pacer, Diabetes Mellitus type 1, Diabetes Mellitus type 2, Chronic Obstructive Pulmonary Disease, Rheumatoid Arthritis, Gastroesophageal Reflux Disease, Cerebrovascular Accident (CVA), Cardiovascular Disease, Alcoholism, Crohn's disease, Terminal Illness.   severe dementianutritional quality good      Social History:      Smoking Status: Light tobacco smoker; Last Reviewed: 2025            Pack-years: 1         Date quit smoking: 10 years ago      Alcohol use: Non-Drinker  Racial background:   Occupation: disabled  Marital status:   Patient knows the purpose/use of medications  Patient is taking medications as prescribed               Family History:   Father  History remarkable for  gun shot.   Age 22;       Mother  History remarkable for  Coronary Artery Disease.   Age 42;       Review of Systems:   General:  Patient denies  sweats, fatigue, fever, chills.  Ears, Nose and Throat:  Patient denies  hearing loss, ringing in the ears.  Cardiovascular:  Patient denies  chest pain.  Respiratory:  Patient denies  shortness of breath.  Gastrointestinal:  Patient denies  nausea, vomiting, diarrhea, constipation.  Genitourinary:  Patient denies  urinary frequency, prostate problems.  Endocrine:  Patient denies  thyroid problems.  Hematologic:  Patient denies  bleeding tendencies, easy bruising tendency.  Musculoskeletal:  Patient denies  joint pain, walking aids.  Neurologic  Patient denies  seizures, headache.  Psychologic:  Patient denies  anxiety, panic attacks, depression.  Skin:  Patient denies  skin rash.       DEPRESSION SCREENING:   Not at all the patient reports little interest or pleasure in  "doing things.  Not at all the patient reports feeling down, depressed, or hopeless.  Date Depression Screening Last Done: 02/13/2020   PHQ-2 Score: 0; PHQ-9 Score: incomplete   Several days the patient reports little interest or pleasure in doing things.  Several days the patient reports feeling down, depressed, or hopeless.  Date Depression Screening Last Done: 05/14/2019      Vital Signs:   Weight 256 lbs; Height 6 ft 4 in; BMI 31.2   01/14/2025 8:55 AM (CST)  Temperature 98.6 °F   01/14/2025 8:58 AM (CST)  Respiration Rate 18; Pulse Rate 70 bpm; Blood Pressure 189 / 99 mm/Hg; Pain Level: 9         Physical Examination:   Pre Anesthesia evaluation  Pre Op dx: lumbosacral spondylosis, disorder of sacrum  Planned procedure: Left SI joint injection   Age: 54  Ht: 6'4"  Wt: 237 lbs  BMI: 28.9  Allergies: Keflex  Meds/Labs/Test  Prior Surgeries:  Anesthesia complications: none known     Medical History  CNS: _X_Neg.   __Seizures  __CVA  __TIA  __HA  __Depression  Cardiac: __Neg  __CAD  __Stents  __MI  _X_HTN  __CHF  Pulmonary: __Neg  __COPD  __Asthma  __Sleep Apnea  __Smoker PPD  __CPAP  GI:_X_Neg.  __Reflux  __Liver Dysfunction  __Hepatitis  __Hiatal Hernia  __Hepatitis  __ETOH  __GERD   Renal:  _X_Neg.  __CRI  __ESRD  Endocrine:  _X_Neg.  __Thyroid  __Diabetes  Heme:  _X_Neg.   __Blood thinners  __other     Cranial Nerves II-XII grossly intact.  No apparent distress.  Patient is alert and oriented times three.  No somnolence or slurred speech.     Lumbar spine has pain with flexion and extension lateral rotation  Lumbar facets are tender to palpation  No focal neurologic deficits noted  physical exam essentially unchanged from previous   Back Motion:   Lumbar / lumbosacral spine abnormal.      Tenderness on Palpation:   Lumbosacral Spine:  There is tenderness on palpation of the  left sciatic notch moderate to severe in nature.      Other Examinations:   Left jethro-fabere test positive.   Gaenslen's Left Positive "         Additional Physical Findings:  General general appearance normal appears comfortable,   Oriented to time, place and person, pleasant, seated  Not uncomfortable  Head normal head exam  Eyes normal eye exam  Chest normal chest exam  Respiratory normal respiratory exam  Musculoskeletal abnormal low back pain and knee pain,   Joint tenderness  Posture normal  Neurologic normal neurologic exam  Skin normal skin exam       Toxicology Report   Toxicology was performed.   Reason for Toxicology:  A presumptive urine drug screen was done today to rapidly obtain and integrate results into clinical assessment and decision-making for ongoing safe prescribing of controlled substances.                                       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.1) - Testicular hypofunction  (M54.16) - Lumbar radiculopathy  (M47.812) - Cervical spondylosis without myelopathy  (M47.817) - Lumbosacral spondylosis  (M54.12) - Cervical radiculopathy  (M54.2) - Neck pain  (G89.4) - Chronic pain syndrome  (M53.3) - Disorder of sacrum      Plan:   Follow up visit 8 weeks      -refilled Percocet -- due 02/02/2025  -scheduled Bilateral L4-L5 RFTC at [facility] on 01/29/2025 at 12:45 pm   -refilled Testosterone with 2 RF -- due today (still has a refill)   -records reviewed from Dr. Graves   -Sent rx for Flexeril 10 mg TID, Gabapentin 600 mg TID  and Voltaren gel ([facility])  to [facility] with 5 RF (due again in June 2025)   -drink plenty of fluids and water  -increase fiber in diet  -call sooner with worsening pain  -pump refill was done on 08/28/2024  -next alarm date will be 02/21/2025  -scheduled pump refill at [facility] on 02/19/2025 at 8:45 am   -refill Vit D 22039 unit weekly to [facility] with 5 RF  -will did receive cardiac clearance from Dr. Arteaga    -refer to Rheumatology for all over joint pain and myalgia (July 9, 2025 at 8:30 am)   -hand out given about SI joint  fusion         Monitored Anesthesia Care medical necessity authorization request:   Monitor anesthesia request is medically indicated for the scheduled _SI joint injection_______procedure due to:     - needle phobia and anxiety, placing the patient at risk during the provided service._YES____  - patient has a BMI greater than 45 ____  - patient has severe sleep apnea for which BiPAP and oxygen are needed while sleeping._____  - patient is unable to follow simple commands due to mental state.____  - patient has an ASA class greater than 3 and requires constant presence of an anesthesiologist/CRNA during the procedure.____  - patient has severe problems with muscles and muscle spasticity that makes it hard to lie still. ____  - patient suffers from chronic pain and is unable to function due to diminished ADL's._YES___  - patient is dependent on opioids or sedatives. _YES___   - Other __YES__        Patient will be scheduled for SI joint injection(s). We have discussed the need for two diagnostically sound procedures before the radiofrequency ablation of the dorsal root rami can be scheduled.        Indications for this procedure for this specific patient include the following:   - Pt has had symptoms for three months with moderate to severe pain with functional impairment rated of  /10 pain.   - Pain non-responsive to conservative care.  - Pain is not associated with radiculopathy or claudication.  - No non-SI joint pathology as source of pain.  - Clinical assessment implicates SI joints as putative pain source.   - Pain is exacerbated by extension or prolonged sitting/standing and relieved by rest.   - No unexplained neurologic deficit.   - No history of coagulopathy , infection or unstable medical conditions.  - Pain is causing significant functional limitation resulting in diminished quality of life and impaired age appropriate ADL's.  - Repeat injections not done prior to 7 days.        NSAIDS  Failure__YES___  Pain for 3months or >_YES____  Pain level 6> intermittent or continuous__YES__  Physical exam with documented signs that SI are the primary source of pain__YES__           NARCOTIC STATEMENT  Patient is taking the narcotic pain medications as prescribed. Refill is being given because of the benefit to the patient in regards to the pain. Patient has agreed not to abuse of medication and not to take it more than what is prescribed. The nature of the drug including the potential for addiction and dependency and abuse was also discussed with the patient. Patient has developed physical dependency for the narcotic pain medication for his pain relief.  Patient has also developed tolerance to the sedative effect of the narcotic pain medications.  Patient has decided to continue with these medications despite potential for addiction as described by this office.  This was stressed to the patient that it is the patient's responsibility to secure the narcotic medication and in any event of loss for any reason whatsoever,  there will be no refill before the next due date. Patient also understands that they are not supposed to drive or work on machinery while taking these medications.  Also explained to the patient that in the event of traffic citation, the presence of this drug in  bloodstream may result in DUI.  Patient has been advised not to drink alcohol while taking this medication.  Patient has verbalized understanding of our office policy and has signed a contract with us in this regard.                  Prescriptions Written Today:  Percocet Oral Tablet  MG  Take 1 tablet twice a day as needed for 30 day(s)  Refills: No Refills  Rx quantity: 60                 Mary Torres       Electronically signed: 1/14/2025 1:01:10 PM      Jaziel Pierson MD      Electronically signed: 1/14/2025 4:58:06 PM       Chief Complaint:      HPI:       Assessment/Plan:         Jaziel Pierson MD  Pain  Management  [facility] - Pain Management

## 2025-02-28 ENCOUNTER — EXTERNAL CHRONIC CARE MANAGEMENT (OUTPATIENT)
Dept: FAMILY MEDICINE | Facility: CLINIC | Age: 55
End: 2025-02-28
Payer: MEDICARE

## 2025-02-28 PROCEDURE — G0511 CCM/BHI BY RHC/FQHC 20MIN MO: HCPCS | Mod: ,,, | Performed by: FAMILY MEDICINE

## 2025-03-05 ENCOUNTER — ANESTHESIA EVENT (OUTPATIENT)
Dept: PAIN MEDICINE | Facility: HOSPITAL | Age: 55
End: 2025-03-05
Payer: MEDICARE

## 2025-03-05 ENCOUNTER — ANESTHESIA (OUTPATIENT)
Dept: PAIN MEDICINE | Facility: HOSPITAL | Age: 55
End: 2025-03-05
Payer: MEDICARE

## 2025-03-05 ENCOUNTER — HOSPITAL ENCOUNTER (OUTPATIENT)
Facility: HOSPITAL | Age: 55
Discharge: HOME | End: 2025-03-05
Attending: ANESTHESIOLOGY | Admitting: ANESTHESIOLOGY
Payer: MEDICARE

## 2025-03-05 VITALS
HEART RATE: 67 BPM | SYSTOLIC BLOOD PRESSURE: 116 MMHG | TEMPERATURE: 98 F | RESPIRATION RATE: 12 BRPM | HEIGHT: 76 IN | OXYGEN SATURATION: 97 % | BODY MASS INDEX: 30.67 KG/M2 | DIASTOLIC BLOOD PRESSURE: 59 MMHG

## 2025-03-05 DIAGNOSIS — M47.816 LUMBAR SPONDYLOSIS: ICD-10-CM

## 2025-03-05 PROCEDURE — 37000008 HC ANESTHESIA 1ST 15 MINUTES: Performed by: ANESTHESIOLOGY

## 2025-03-05 PROCEDURE — 64635 DESTROY LUMB/SAC FACET JNT: CPT | Mod: 50 | Performed by: ANESTHESIOLOGY

## 2025-03-05 PROCEDURE — 63600175 PHARM REV CODE 636 W HCPCS: Performed by: NURSE ANESTHETIST, CERTIFIED REGISTERED

## 2025-03-05 PROCEDURE — 63600175 PHARM REV CODE 636 W HCPCS: Performed by: ANESTHESIOLOGY

## 2025-03-05 RX ORDER — SODIUM CHLORIDE 9 MG/ML
500 INJECTION, SOLUTION INTRAVENOUS CONTINUOUS
Status: DISCONTINUED | OUTPATIENT
Start: 2025-03-05 | End: 2025-03-05 | Stop reason: HOSPADM

## 2025-03-05 RX ORDER — ORPHENADRINE CITRATE 30 MG/ML
INJECTION INTRAMUSCULAR; INTRAVENOUS
Status: DISCONTINUED | OUTPATIENT
Start: 2025-03-05 | End: 2025-03-05

## 2025-03-05 RX ORDER — LIDOCAINE HYDROCHLORIDE 20 MG/ML
INJECTION, SOLUTION EPIDURAL; INFILTRATION; INTRACAUDAL; PERINEURAL
Status: DISCONTINUED | OUTPATIENT
Start: 2025-03-05 | End: 2025-03-05

## 2025-03-05 RX ORDER — PROPOFOL 10 MG/ML
VIAL (ML) INTRAVENOUS
Status: DISCONTINUED | OUTPATIENT
Start: 2025-03-05 | End: 2025-03-05

## 2025-03-05 RX ORDER — BUPIVACAINE HYDROCHLORIDE 2.5 MG/ML
INJECTION, SOLUTION INFILTRATION; PERINEURAL CODE/TRAUMA/SEDATION MEDICATION
Status: DISCONTINUED | OUTPATIENT
Start: 2025-03-05 | End: 2025-03-05 | Stop reason: HOSPADM

## 2025-03-05 RX ORDER — TRIAMCINOLONE ACETONIDE 40 MG/ML
INJECTION, SUSPENSION INTRA-ARTICULAR; INTRAMUSCULAR CODE/TRAUMA/SEDATION MEDICATION
Status: DISCONTINUED | OUTPATIENT
Start: 2025-03-05 | End: 2025-03-05 | Stop reason: HOSPADM

## 2025-03-05 RX ORDER — FENTANYL CITRATE 50 UG/ML
INJECTION, SOLUTION INTRAMUSCULAR; INTRAVENOUS
Status: DISCONTINUED | OUTPATIENT
Start: 2025-03-05 | End: 2025-03-05

## 2025-03-05 RX ADMIN — PROPOFOL 40 MG: 10 INJECTION, EMULSION INTRAVENOUS at 02:03

## 2025-03-05 RX ADMIN — ORPHENADRINE CITRATE 60 MG: 30 INJECTION INTRAMUSCULAR; INTRAVENOUS at 01:03

## 2025-03-05 RX ADMIN — FENTANYL CITRATE 100 MCG: 50 INJECTION, SOLUTION INTRAMUSCULAR; INTRAVENOUS at 01:03

## 2025-03-05 RX ADMIN — LIDOCAINE HYDROCHLORIDE 80 MG: 20 INJECTION, SOLUTION EPIDURAL; INFILTRATION; INTRACAUDAL; PERINEURAL at 01:03

## 2025-03-05 RX ADMIN — PROPOFOL 100 MG: 10 INJECTION, EMULSION INTRAVENOUS at 01:03

## 2025-03-05 NOTE — DISCHARGE SUMMARY
Patient underwent  Bilateral L4 -L5 Radiofrequency Thermocoagulation of the Medial Branch Nerves   procedure 03/05/2025. The pt will follow up in clinic. Discharged home. Discharge Dx:  Lumbar Spondylosis without Myelopathy

## 2025-03-05 NOTE — ANESTHESIA PREPROCEDURE EVALUATION
03/05/2025  Jesse Andrade is a 54 y.o., male.    Past Medical History:   Diagnosis Date    Arthritis     Hyperlipidemia     Hypertension     Sleep apnea        Past Surgical History:   Procedure Laterality Date    CERVICAL SPINE SURGERY      fusion    EPIDURAL STEROID INJECTION INTO CERVICAL SPINE Right 6/7/2023    Procedure: INJECTION, STEROID, SPINE, CERVICAL, EPIDURAL;  Surgeon: Jaziel Pierson MD;  Location: [facility] PAIN MGMT;  Service: Pain Management;  Laterality: Right;  C6-7 cath guided ARTHUR with right bias    EPIDURAL STEROID INJECTION INTO CERVICAL SPINE Left 10/25/2023    Procedure: INJECTION, STEROID, SPINE, CERVICAL, EPIDURAL;  Surgeon: Jaziel Pierson MD;  Location: [facility] PAIN [facility]MT;  Service: Pain Management;  Laterality: Left;  Left C6-7 cath guided ARTHUR    FACIAL FRACTURE SURGERY      INJECTION OF ANESTHETIC AGENT AROUND MEDIAL BRANCH NERVES INNERVATING CERVICAL FACET JOINT Bilateral 12/20/2023    Procedure: BLOCK, NERVE, FACET JOINT, CERVICAL, MEDIAL BRANCH;  Surgeon: Jaziel Pierson MD;  Location: [facility] PAIN [facility]MT;  Service: Pain Management;  Laterality: Bilateral;    INJECTION OF ANESTHETIC AGENT AROUND MEDIAL BRANCH NERVES INNERVATING CERVICAL FACET JOINT Bilateral 4/17/2024    Procedure: BLOCK, NERVE, FACET JOINT, CERVICAL, MEDIAL BRANCH;  Surgeon: Jaziel Pierson MD;  Location: [facility] PAIN [facility]MT;  Service: Pain Management;  Laterality: Bilateral;  [place]y C5-7 MBNB    INJECTION OF ANESTHETIC AGENT AROUND MEDIAL BRANCH NERVES INNERVATING LUMBAR FACET JOINT Bilateral 10/26/2022    Procedure: BLOCK, NERVE, FACET JOINT, LUMBAR, MEDIAL BRANCH;  Surgeon: Jaziel Pierson MD;  Location: [facility] PAIN [facility]MT;  Service: Pain Management;  Laterality: Bilateral;  Bilateral L2-5 MBNB    INJECTION OF ANESTHETIC AGENT INTO SACROILIAC JOINT Left 8/14/2024     Procedure: BLOCK, SACROILIAC JOINT;  Surgeon: Jaziel Pierson MD;  Location: [facility]IN [facility];  Service: Pain Management;  Laterality: Left;  Left SI JI    INJECTION OF ANESTHETIC AGENT INTO SACROILIAC JOINT Left 12/18/2024    Procedure: BLOCK, SACROILIAC JOINT;  Surgeon: Jaziel Pierson MD;  Location: [facility] PAIN [facility];  Service: Pain Management;  Laterality: Left;  Left SI JI    RADIOFREQUENCY ABLATION OF LUMBAR MEDIAL BRANCH NERVE AT SINGLE LEVEL Right 12/14/2022    Procedure: RADIOFREQUENCY ABLATION, NERVE, SPINAL, LUMBAR, MEDIAL BRANCH, 1 LEVEL;  Surgeon: Jaziel Pierson MD;  Location: [facility] PAIN [facility];  Service: Pain Management;  Laterality: Right;  Right L3-5 RFTC    RADIOFREQUENCY ABLATION OF LUMBAR MEDIAL BRANCH NERVE AT SINGLE LEVEL Bilateral 7/26/2023    Procedure: RADIOFREQUENCY ABLATION, NERVE, SPINAL, LUMBAR, MEDIAL BRANCH, 1 LEVEL;  Surgeon: Jaziel Pierson MD;  Location: [facility]IN [facility];  Service: Pain Management;  Laterality: Bilateral;  Bilateral L3-5 RFTC    RADIOFREQUENCY ABLATION OF LUMBAR MEDIAL BRANCH NERVE AT SINGLE LEVEL Bilateral 5/22/2024    Procedure: RADIOFREQUENCY ABLATION, NERVE, SPINAL, LUMBAR, MEDIAL BRANCH, 1 LEVEL;  Surgeon: Jaziel Pierson MD;  Location: [facility]IN [facility];  Service: Pain Management;  Laterality: Bilateral;  [place]y L3-5 RFTC    REFILL PAIN PUMP N/A 4/14/2021    Procedure: REFILLING, ANALGESIC PUMP;  Surgeon: Jaziel Pierson MD;  Location: [facility]IN [facility];  Service: Pain Management;  Laterality: N/A;  pump refill    REFILL PAIN PUMP N/A 10/27/2021    Procedure: REFILLING, ANALGESIC PUMP;  Surgeon: Jaziel Pierson MD;  Location: [facility]IN [facility];  Service: Pain Management;  Laterality: N/A;  Pump refill    REFILL PAIN PUMP N/A 4/13/2022    Procedure: REFILLING, ANALGESIC PUMP;  Surgeon: Jaziel Pierson MD;  Location: [facility]IN [facility];  Service: Pain Management;  Laterality: N/A;  Pump refill    REFILL  PAIN PUMP N/A 10/5/2022    Procedure: REFILLING, ANALGESIC PUMP;  Surgeon: Jaziel Pierson MD;  Location: [facility] PAIN [facility]MT;  Service: Pain Management;  Laterality: N/A;  Pump refill    REFILL PAIN PUMP N/A 3/29/2023    Procedure: REFILLING, ANALGESIC PUMP;  Surgeon: Jaziel Pierson MD;  Location: [facility] PAIN [facility];  Service: Pain Management;  Laterality: N/A;  Pump refill    REFILL PAIN PUMP N/A 9/20/2023    Procedure: REFILLING, ANALGESIC PUMP;  Surgeon: Jaziel Pierson MD;  Location: [facility] PAIN [facility]MT;  Service: Pain Management;  Laterality: N/A;  pump refill    REFILL PAIN PUMP N/A 3/6/2024    Procedure: REFILLING, ANALGESIC PUMP;  Surgeon: Jaziel Pierson MD;  Location: [facility] PAIN [facility];  Service: Pain Management;  Laterality: N/A;  Pump refill    REFILL PAIN PUMP N/A 8/28/2024    Procedure: REFILLING, ANALGESIC PUMP;  Surgeon: Jaziel Pierson MD;  Location: [facility] PAIN [facility]MT;  Service: Pain Management;  Laterality: N/A;  pump refill    REFILL PAIN PUMP N/A 2/19/2025    Procedure: REFILLING, ANALGESIC PUMP;  Surgeon: Jaziel Pierson MD;  Location: [facility] PAIN [facility]MT;  Service: Pain Management;  Laterality: N/A;  Pump refill    TOTAL KNEE ARTHROPLASTY Bilateral        Family History   Problem Relation Name Age of Onset    Coronary artery disease Mother         Social History     Socioeconomic History    Marital status:    Tobacco Use    Smoking status: Former     Current packs/day: 0.75     Average packs/day: 0.8 packs/day for 10.0 years (7.5 ttl pk-yrs)     Types: Cigarettes     Passive exposure: Past    Smokeless tobacco: Current     Types: Snuff   Substance and Sexual Activity    Alcohol use: Never       Current Medications[1]    Review of patient's allergies indicates:   Allergen Reactions    Keflex [cephalexin]         Pre-op Assessment    I have reviewed the Patient Summary Reports.     I have reviewed the Nursing Notes. I have reviewed the NPO  Status.   I have reviewed the Medications.     Review of Systems  Anesthesia Hx:  No problems with previous Anesthesia                Social:  Former Smoker       Cardiovascular:     Hypertension           hyperlipidemia                         Hypertension         Pulmonary:        Sleep Apnea     Obstructive Sleep Apnea (THEO).           Neurological:        Chronic Pain Syndrome                         Endocrine:        Obesity / BMI > 30         Anesthesia Plan  Type of Anesthesia, risks & benefits discussed:    Anesthesia Type: Gen Natural Airway, MAC  Intra-op Monitoring Plan: Standard ASA Monitors  Post Op Pain Control Plan: IV/PO Opioids PRN  Induction:  IV  Informed Consent: Informed consent signed with the Patient and all parties understand the risks and agree with anesthesia plan.  All questions answered. Patient consented to blood products? Yes  ASA Score: 3  Day of Surgery Review of History & Physical: H&P Update referred to the surgeon/provider.I have interviewed and examined the patient. I have reviewed the patient's H&P dated: There are no significant changes.     Ready For Surgery From Anesthesia Perspective.     .             [1]  No current facility-administered medications for this encounter.     Current Outpatient Medications   Medication Sig Dispense Refill    albuterol (PROAIR HFA) 90 mcg/actuation inhaler Inhale 2 puffs into the lungs every 6 (six) hours as needed for Wheezing. Rescue 18 g 1    amLODIPine (NORVASC) 5 MG tablet Take 1 tablet (5 mg total) by mouth once daily. 90 tablet 3    aspirin 81 MG Chew TAKE ONE (1) TABLET BY MOUTH ONCE A DAY 90 tablet 3    atorvastatin (LIPITOR) 40 MG tablet Take 2 tablets (80 mg total) by mouth every evening. 180 tablet 3    carvediloL (COREG) 25 MG tablet Take 1 tablet (25 mg total) by mouth 2 (two) times daily with meals. 180 tablet 3    cholecalciferol, vitamin D3, 1,250 mcg (50,000 unit) capsule Take 1 capsule (50,000 Units total) by mouth once  a week for four (4) weeks. 4 capsule 4    cyclobenzaprine (FLEXERIL) 10 MG tablet Take 10 mg by mouth 3 (three) times daily as needed for Muscle spasms.      cyclobenzaprine (FLEXERIL) 10 MG tablet Take 1 tablet (10 mg total) by mouth 2 (two) times daily as needed. 60 tablet 5    cyclobenzaprine (FLEXERIL) 10 MG tablet Take one (1) tablet by mouth twice a day as needed. 60 tablet 1    cyclobenzaprine (FLEXERIL) 10 MG tablet Take 1 tablet (10 mg total) by mouth 2 (two) times daily as needed. 60 tablet 5    diclofenac sodium (VOLTAREN) 1 % Gel Apply 2 grams topically to the affected areas three times a day for 30 day(s) 180 g 5    fluticasone propionate (FLONASE) 50 mcg/actuation nasal spray 1-2 sprays by Nasal route once daily. (Patient not taking: Reported on 9/21/2024)      gabapentin (NEURONTIN) 600 MG tablet Take 1 tablet three times a day for 30 day(s) 90 tablet 4    gabapentin (NEURONTIN) 600 MG tablet Take 1 tablet three times a day for 30 day(s) 90 tablet 0    gabapentin (NEURONTIN) 600 MG tablet Take 1 tablet by mouth three times a day for nerve pain 90 tablet 5    lisinopriL (PRINIVIL,ZESTRIL) 40 MG tablet TAKE ONE (1) TABLET BY MOUTH ONCE A DAY 90 tablet 3    morphine 4 mg/mL Soln injection Inject 4 mg into the vein every 4 (four) hours.      ondansetron (ZOFRAN-ODT) 4 MG TbDL Take 1 tablet (4 mg total) by mouth every 8 (eight) hours as needed (nausea, vomiting). 15 tablet 0    oxyCODONE-acetaminophen (PERCOCET)  mg per tablet Take 1 tablet by mouth 3 (three) times daily as needed for pain. 90 tablet 0    oxyCODONE-acetaminophen (PERCOCET)  mg per tablet take 1 tablet by mouth twice daily 60 tablet 0    oxyCODONE-acetaminophen (PERCOCET)  mg per tablet Take 1 tablet by mouth 2 (two) times daily as needed. 60 tablet 0    oxyCODONE-acetaminophen (PERCOCET)  mg per tablet Take 1 tablet by mouth 2 (two) times a day as needed. 60 tablet 0    oxyCODONE-acetaminophen  (PERCOCET)  mg per tablet Take 1 tablet by mouth 2 times daily as needed......May cause drowsiness. 60 tablet 0    testosterone cypionate (DEPOTESTOTERONE CYPIONATE) 200 mg/mL injection Inject 1/2 ML (100mg) into the muscle every 72 hours. 10 mL 2

## 2025-03-05 NOTE — TRANSFER OF CARE
"Anesthesia Transfer of Care Note    Patient: Jesse Andrade    Procedure(s) Performed: Procedure(s) (LRB):  RADIOFREQUENCY ABLATION, NERVE, SPINAL, LUMBAR, MEDIAL BRANCH, 1 LEVEL (Bilateral)    Patient location: GI    Anesthesia Type: MAC    Transport from OR: Transported from OR on room air with adequate spontaneous ventilation. Continuous ECG monitoring in transport. Continuous SpO2 monitoring in transport    Post pain: adequate analgesia    Post assessment: no apparent anesthetic complications    Post vital signs: stable    Level of consciousness: sedated and responds to stimulation    Nausea/Vomiting: no nausea/vomiting    Complications: none    Transfer of care protocol was followedComments: Good SV continue, NAD, VSS, RTRN    Last vitals: Visit Vitals  BP (!) 112/56   Pulse 68   Temp 36.4 °C (97.5 °F)   Resp 14   Ht 6' 4" (1.93 m)   SpO2 98%   BMI 30.67 kg/m²     "

## 2025-03-05 NOTE — PLAN OF CARE
Plan:  D/c pt via wheelchair at 1455  Informed pt if does not void in 8 hours to go to ER. Notify if redness, drainage, from injection site or fever over next 3-4 days. Rest and drink plenty of fluids for the remainder of the day. No lifting over 5 lbs. For the remainder of the day. Continue regular medications as prescribed. May take pain medications as prescribed.     Pain improved 100%

## 2025-03-05 NOTE — OP NOTE
03/05/2025  Jesse Andrade 1970        PREOPERATIVE DIAGNOSIS:         Lumbar Spondylosis without Myelopathy                                                              Low Back Pain  POSTOPERATIVE DIAGNOSIS:      Lumbar Spondylosis without Myelopathy                                                              Low Back Pain     PROCEDURE:  Bilateral L4 -L5 Radiofrequency Thermocoagulation of the Medial Branch Nerves     SURGEON: Dr. Jaziel Pierson   ANESTHESIA:  MAC              COMPLICATIONS:  None  DRAINS AND PACKS:  None            BLOOD LOSS:  None  The patient was identified in the holding area.  The risks and benefits of the procedure were again explained to the patient and the patient agreed to proceed.  The patient was brought in stable condition to the operating room and placed in the prone position on the C-Arm table.  All pressure points were checked and padded comfortably with the patient awake.  Standard ASA monitors were applied.  The patients back was prepped and draped in the usual sterile fashion.  Time out was completed.  Anesthesia was initiated and a skin wheal was raised over the target areas using  Bupivacaine 0.25% (2.5mg/ml) 1ml on the left side at  L4 and L5.  Under direct fluoroscopic guidance through anesthetized skin a 150 millimeter 10mm 18gage curved active tip radiofrequency thermocoagulation needle was advanced down to the target area at the junction between the superior articular process and transverse process of the corresponding levels.  Stimulation of the medial branch nerve was carried out and was less than 1.0 at all levels and motor was greater than 2.0 at each level.  The patient then has a 1 milliliter allotment of 0.25 % Bupivacaine (2.5mg/ml)  was injected at each level.  The patient then had a lesioning process of 80 degrees celsius for 105 five second times two runs.  The patient then through each cannula received a 1 milliliter allotment of a solution that  contained Kenalog 40mg/ml  diluted in 9 milliliters of 0.25%  Bupivacaine (2.5mg/ml).  The stylettes were removed with the tips intact. The procedure was repeated on the right as described above. There was adequate hemostasis at the conclusion of the procedure. The patient tolerated the procedure well with no adverse events and no complications. The patient was taken in stable condition to the holding area and monitored for the appropriate time of convalescence.  Preoperative pain score was 6/10. Postoperative pain score was    /10.

## 2025-03-05 NOTE — ANESTHESIA POSTPROCEDURE EVALUATION
Anesthesia Post Evaluation    Patient: Jesse Andrade    Procedure(s) Performed: Procedure(s) (LRB):  RADIOFREQUENCY ABLATION, NERVE, SPINAL, LUMBAR, MEDIAL BRANCH, 1 LEVEL (Bilateral)    Final Anesthesia Type: MAC      Patient location during evaluation: GI PACU  Patient participation: Yes- Able to Participate  Level of consciousness: awake and alert  Post-procedure vital signs: reviewed and stable  Pain management: adequate  Airway patency: patent    PONV status at discharge: No PONV  Anesthetic complications: no      Cardiovascular status: blood pressure returned to baseline and hemodynamically stable  Respiratory status: spontaneous ventilation  Hydration status: euvolemic  Follow-up not needed.  Comments: Refer to nursing notes for pain/alexi score upon discharge from recovery.              Vitals Value Taken Time   /66 03/05/25 14:43   Temp 36.4 °C (97.5 °F) 03/05/25 14:22   Pulse 64 03/05/25 14:43   Resp 15 03/05/25 14:43   SpO2 96 % 03/05/25 14:43   Vitals shown include unfiled device data.      No case tracking events are documented in the log.      Pain/Alexi Score: Alexi Score: 10 (3/5/2025  2:35 PM)

## 2025-03-05 NOTE — H&P
"[facility] - Pain Management  Pain Management  H&P    Patient Name: Jesse Andrade  MRN: [id number]  Admission Date: 3/5/2025  Primary Care Provider: Otilio Vargas MD    Patient information was obtained from     Subjective:     [facility]  [address] [facility] [address]  [place] [address]  [phone]                   RE: Jesse Andrade      : 1970   Date of Service: 2025   Existing Patient           Chief Complaint:   Neck pain described as aching, constant, dull, sharp; Location bilaterally, at the midline, radiating to shoulder(s); aggravated by activity, poor posture; relieved by narcotics, rest-lying down; onset gradual, constant; reported as 6/10 on pain scale,  Back pain achy in nature, constant, dull; located in the lumbar region, in the midline; aggravated by activity, standing, bending, lifting, walking, weather/temperature change; relieved by analgesics, rest; gradual in occurrence; pain rated as 4/10 on the pain scale,  Knee pain Location right patella; characterized as aching aggravated by walking Timing constant Severity moderate to severe.   Patient seated in room 4 with c/o neck and right hip pain, reports pain is unchanged since last visit          [place] Prescription Monitoring Program data was reviewed for this patient for the past 12 calendar months to ascertain any current, or past use of scheduled medications.      History of Present Illness:   What part of the body? neck and right hip   Pain level at best 3; Pain level at worst 6; Pain level at present 7; Pain level on average 6   55 y/o BM with complaints of "low back pain and Right knee pain"; objective data essentially unchanged from previous visit; states that pain improved by 80% after Left SI joint injection #2 that lasted for several days; states that pain improved by 80% after Bilateral L4-L5 RFTC in May 2024 but is now having more lower back pain but was not able to have this done in  but he has " this rescheduled for next week on 03/05/2025; he was having more pain to Left SI Joint injection that improved pain by 80% that lasted for a couple of days; he was also able to see Dr. Graves for abnormal PSA and was told everything was ok and can restart testosterone and was supposed to see him yesterday and missed his last appt; he is also complaining of generalized myalgia; he was able to have his Bilateral C5-C7 MBNB #2 that improved pain by 100% for several days but is now hurting more but defers neck procedures for now; his previous lumbar RFTC was July 2023 that improved by 80% and has lasted for over 9 months; also states that gabapentin helps when he takes it but does not last 12 hrs-we will increase to TID dosing; he had his Pump refill in March; he has driven to [place] for a death in the family; he is having more neck pain and pain to both shoulders; states that pain improved by 75% after Bilateral C3-C5 MBNB #1 and is still doing good with pain relief; he is still doing good with numbness and tingling and has been able to sleep better; his last cervical ARTHUR helped with numbness to LUE but MBNB helped pain to neck; he was able to have his pump refilled and will be due again in March; he is still having more left side neck pain with numbness to fingers; states that pain improved by 80% after Bilateral L4-L5 RFTC and still continues to do well with lower back pain relief; states that pain improved by 85% after cervical ARTHUR but will occasionally have some LUE tingling that lasted for several weeks but is now ready to have an additional injection to help with neck pain without radicular symptoms; his last cervical RF was June 2022; he was able to have his Lumbar RFTC done in Dec but only did the Right side because he took his ASA that morning; he actually had about 80% improvement of pain; pain is only worse with increased movements and first in the morning but improves throughout the day; he has already  had his pump refill on 10/05 as well as #2 MBNB that improved by 85% that lasted for a couple of weeks; states that pain improved by 90% that lasted about 2 weeks after Bilateral L2-L5 MBNB #1; he has just recently had his Cervical RFTC in June that has improved his pain and is not having much pain at all to his cervical spine; states that pain improved by 80% after Left C3-C7 RFTC in Nov 2021; overall, pain has been about the same except for some worsening lower back pain; states that pain improved by 80% after Bilateral C3-C7 MBNB that lasted for about a week; pain does get better with movement but tingling to his fingers has improved; we will consider ARTHUR on return if tingling persists/returns but this has improved since last procedure; pain improved by 85% after Bilateral L3-L5 MBNB in 09/2021 that lasted for several months and is worse now with bending and moving; he was able to have his CT Lspine 01/2021 that revealed some changes but the last injection helped with pain; he was also able to have his pump refilled in Oct and will be due again in April;  states that he has started walking some but causes worsening pain; states that pain improved by 75% after Bilateral C3-C7 MBNB in Dec that lasted for several months but pain is slowly returning and is ready to schedule a neck injection to help with radicular symptoms at this time; denies any radiation of pain into legs and no numbness or tingling to upper or lower extremities; exercise has helped some but pain is still there; states that neck pain has been worse the past several months that wakes him up at night; he is now having more pain that prevents sleeping and from doing every day activity; states that pain improved by 75% after his Bilateral C3-C7 RFTC that lasted for several months; states that pain improved after Bilateral C3-C7 #1 about 80% and then has had a 80% improvement in pain after his Bilateral C3-C7 FI#2 as well as had 50% improvement of  ADL's; He has been going to physical therapy after having a R knee replacement in Jan 2019 by Dr. Garcia and has had more pain to knee but Percocet is helping better with that pain as well; he is still exercising some but continues to have pain and swelling; usually ice, rest and meds help with this; he sees Dr. Garcia again in July 2020 but feels like it is not healing as well as it should; he has been going to the gym to help with pain and strengthening; states he has been doing well and is better today since last visit after having his last injection; He describes the pain as dull pain that is constant; he has also started taking testosterone injections at home but will have an appt with Dr. Graves hopefully in Aug; denies any issues with constipation; He has been experiencing some tingling and numbness to his R thigh; he started Neurontin 300mg BID since last visit and states it is helping with the numbness and tingling sensation and request to continue with 600 mg daily; also states that the Flexeril has helped better with muscle spasms     UDS: consistent x 22; inconsistent x 3 (no percocet, +Norco that he had left over from the past but instructed that this can not happen again)   The previous urine drug screen was evaluated, and it was compliant for the medications that has been prescribed. A presumptive urine drug screen was done today to rapidly obtain and integrate results into clinical assessment and decision-making for ongoing safe prescribing of controlled substances. The results of the presumptive UDS done today was positive for opiates. He is prescribed oxycodone. Because presumptive UDS positive results are not definitive due to sensitivity and specificity and cross reactivity limitations and negative results do not necessarily indicate absence of drugs or substances in the urine specimen, confirmation will identify specific prescribed and non-prescribed medications or illicit use for ongoing safe  prescribing of controlled substances including benzodiazepines, opioid agonist, opioids antagonist, partial agonist, stimulants, muscle relaxers, antidepressants, sleep aids, anti-seizure medicine, and alcohol. Urine drug analysis is used to assist with diagnosis and therapeutic decision-making concerning pretreatment assessment. Intensity and frequency of monitoring with urine drug testing will be based on the risk stratification method in determining risk level for opioid addiction.     Meds: Percocet --due 03/04/2025; requests refills on meds and states that meds help with his pain; no misuse of meds and no opioid abuse;  reviewed and is appropriate; he is currently prescribed 30 mg of morphine equivalent meds/day      Nursing:   Pain Medication/Dose/Last Taken/# Taken  ms pump  oxycodone  pain level with medication is  4/10 and without is a 8/10     Is it helping? Yes  Physical Therapy  no Home Exercises  Is it helping? Yes     no New medical problems or surgeries  no New medications  no New allergies  no New antibiotics, fever, infection      Allergies:   Allergies Reviewed - 02/27/25 9:27:09 AM CST  Keflex       Current Medications:   Medications List Reviewed (02/27/25 9:27:02 AM CST)  Ketorolac Tromethamine Injection Solution 60 MG/2ML (11/2/2023) From 11/02/2023 11:15 AM to 11/02/2023 11:30 AM  dexAMETHasone Sodium Phosphate Injection Solution 4 MG/ML (11/2/2023) From 11/02/2023 11:15 AM to 11/02/2023 11:30 AM  Diclofenac Sodium External Gel 1 % (12/2/2024) Apply 2 gram applications three times a day for 30 day(s)  Cyclobenzaprine HCl Oral Tablet 10 MG (12/2/2024) Take 1 tablet twice a day as needed for 30 day(s)  Gabapentin Oral Tablet 600 MG (12/2/2024) Take 1 tablet three times a day for 30 day(s)  Vitamin D3 Oral Capsule 1.25 MG (78348 UT) (5/1/2024) Take 1 capsule once a week for 4 week(s)  Percocet Oral Tablet  MG (2/27/2025) Take 1 tablet twice a day as needed for 30 day(s)  Testosterone  "Cypionate Intramuscular Solution 200 MG/ML (2024) Inject 1/2 milliliter IM every 72 hours  Morphine Sulfate Intramuscular Device 10 MG/0.7ML (2017) Morphine Pain Pump  Aspirin Oral Tablet Delayed Release 325 MG (2017) Take 1 tablet twice a week for 30 week(s)  Atorvastatin Calcium Oral Tablet 10 MG (2017) Take 1 tablet once a day for 30 day(s)      Previous Studies:  CT SCAN  Final Report  CT CTIC SPINE LUMBAR W/O CONTRAST    Show Printer-Friendly Version with Images (4 of 5)  Show Printer-Friendly Version without images Patient Name: Jesse Andrade   : Mar-   ID: [id number]([facility])   Study Date:  08:03             Studies- CTIC SPINE LUMBAR W/O CONTRAST Indications- Lumbar radiculopathy. Loose leads on pain pump Comparison- None. Technique- CT of the lumbar spine was performed without the administration of intravenous contrast. Axial, sagittal, and coronal series were submitted for interpretation. Findings- Alignment of the lumbar vertebral bodies is normal. Intervertebral disc spaces as well as vertebral body heights are well maintained throughout the lumbar spine. Facet joints have normal anatomic relationships and minimal degenerative change. No acute fractures are demonstrated. The imaged intra-abdominal and intrapelvic contents demonstrate no evidence of acute pathology. Visualized portion of the "lead" demonstrates no evidence of discontinuity. Multiple enlarged lymph nodes are demonstrated within the ileocolic mesentery. These measure 7 mm in short axis dimension. Additional enlarged lymph nodes in the paracaval region measuring up to 6-7 mm. Borderline bilateral intrapelvic lymph nodes measuring up to 8 mm short axis dimension are demonstrated. Conclusion- 1. Multiple intrapelvic and mesenteric lymph nodes are demonstrated which are borderline in size to minimally enlarged. When compared to additional imaging of the abdomen and pelvis performed 2014, these " lymph nodes appear to have minimally increased in size as well as number. Significance of these lymph nodes and relevant to patient's back pain is uncertain. Correlation recommended. 2. No significant abnormality of the lumbar spine is demonstrated. 3. No break in continuity of the demonstrated lead is present. 2016 8-30 AM Ordering physician-SALINAS SINGH MD Point of service- [facility]. This report has been electronically signed by Ryley Altman - MARYBEL Kaplan Radiologist- RYLEY ALTMAN II, M.D. Releasing Radiologist- RYLEY ALTMAN II, M.D. Released Date Time- 16 0845 ------------------------------------------------------------------------------     Signed by: Serena Weiner    Signed on:  08:30    CT [facility] 2021  Impression:  1. no acute fracture or subluxation within the lumber spine  2. Mild degenerative disc disease and facet/unconvertebral arthropathy at L5/S1. Moderate symmetric bilateral neuroforaminal stenoses at L5/S1  3. Very mild to mild midline broad-based posterior intervertebral disc bulges from L3/L4-L5/S1 without high grade spinal canal stenosis    CT [facility]spine at [facility]  2023  Impression:  multilevel degenerative changes throughout the cervical spine; previous C5-C7 ACDF         ; X-RAY  Final Report  CR XR RIBS LEFT WITH PA/AP CHEST    Show Printer-Friendly Version Patient Name: Jesse Andrade   : Mar-   ID: [id number]([facility])   Study Date:  13:59             AP chest with left rib detail. Indication- Fall, with left chest wall pain. The heart size is normal. There is elevation of the right hemidiaphragm, stable finding. Surgical clips in the right upper quadrant. Postsurgical changes in the cervical spine. No pneumothorax. No pleural effusion. No rib fracture is seen. Impression- No acute abnormality. Place of service- [facility] This report has been electronically signed by Mariah Saucedo  Kelly Kaplan Physician- GALEN TIERNEY M.D. Releasing Dagmar TIERNEY M.D. Released Date Time- 18 1419 ------------------------------------------------------------------------------     Signed by: Husam, Generated    Signed on:  14:13  Final Report  CR CR SPINE CERVICAL AP AND LATERAL  Show Printer-Friendly Version Patient Name: Jesse Andrade    : Mar-    ID: [id number]([facility])    Study Date: Mar- 12:39       CR SPINE CERVICAL AP AND LATERAL    Indication- Cervicalgia    Comparison- Cervical spine x-ray Shirley 15, 2005    Technique- Frontal and lateral views of the cervical spine.    Findings-     There is straightening of normal cervical lordosis which may be  positional or secondary muscle spasm. There is no significant  anterolisthesis or retrolisthesis.  Anterior cervical fusion hardware at  C5-C7 with osseous fusion. Mild marginal osteophyte formation noted at  C3-4 and C4-5. The C7-T1 level is not well visualized on lateral view.  No gross evidence of significant vertebral body height loss.    IMPRESSION-    Degenerative change and malalignment of the cervical spine as detailed  above.Anterior cervical fusion hardware at C5-C7 with osseous fusion.       Point of Service- [facility]    This report has been electronically signed by Santhosh Kaplan Physician- SANTHOSH BATRES D.O.       Releasing Dagmar BATRES D.O.       Released Date Time- 19 1409   ------------------------------------------------------------------------------    Signed by: Husam, Generated Signed on: Mar- 13:58  ; Findings  LAB results from Dr. Daley:  2018  Vitamin D  10.8  Aug 2018  PSA  0.989  Testosterone 255  Total Testosterone 226  Free Testosterone 4.75     C spine X-ray 2019      Past Medical History:   The patient has a past medical history of  Hypertension, High Cholesterol, Osteoarthritis  (OA), Joint Pain.  There is no past medical history of  Cardiac Pacer, Diabetes Mellitus type 1, Diabetes Mellitus type 2, Chronic Obstructive Pulmonary Disease, Rheumatoid Arthritis, Gastroesophageal Reflux Disease, Cerebrovascular Accident (CVA), Cardiovascular Disease, Alcoholism, Crohn's disease, Terminal Illness.   severe dementianutritional quality good      Social History:      Smoking Status: Light tobacco smoker; Last Reviewed: 2025            Pack-years: 1         Date quit smoking: 10 years ago      Alcohol use: Non-Drinker  Racial background:   Occupation: disabled  Marital status:   Patient knows the purpose/use of medications  Patient is taking medications as prescribed               Family History:   Father  History remarkable for  gun shot.   Age 22;       Mother  History remarkable for  Coronary Artery Disease.   Age 42;       Review of Systems:   General:  Patient denies  sweats, fatigue, fever, chills.  Ears, Nose and Throat:  Patient denies  hearing loss, ringing in the ears.  Cardiovascular:  Patient denies  chest pain.  Respiratory:  Patient denies  shortness of breath.  Gastrointestinal:  Patient denies  nausea, vomiting, diarrhea, constipation.  Genitourinary:  Patient denies  urinary frequency, prostate problems.  Endocrine:  Patient denies  thyroid problems.  Hematologic:  Patient denies  bleeding tendencies, easy bruising tendency.  Musculoskeletal:  Patient denies  joint pain, walking aids.  Neurologic  Patient denies  seizures, headache.  Psychologic:  Patient denies  anxiety, panic attacks, depression.  Skin:  Patient denies  skin rash.       DEPRESSION SCREENING:   Not at all the patient reports little interest or pleasure in doing things.  Not at all the patient reports feeling down, depressed, or hopeless.  Date Depression Screening Last Done: 2020   PHQ-2 Score: 0; PHQ-9 Score: incomplete   Several days the patient reports little  "interest or pleasure in doing things.  Several days the patient reports feeling down, depressed, or hopeless.  Date Depression Screening Last Done: 05/14/2019      Vital Signs:   Weight 255 lbs; Height 6 ft 4 in; BMI 31.0   02/27/2025 9:23 AM (CST)  Temperature 98.6 °F   02/27/2025 9:27 AM (CST)  Respiration Rate 18; Pulse Rate 68 bpm; Blood Pressure 156 / 100 mm/Hg; Pain Level: 9         Physical Examination:   Pre Anesthesia evaluation  Pre Op dx: lumbosacral spondylosis, disorder of sacrum  Planned procedure: Left SI joint injection   Age: 54  Ht: 6'4"  Wt: 237 lbs  BMI: 28.9  Allergies: Keflex  Meds/Labs/Test  Prior Surgeries:  Anesthesia complications: none known     Medical History  CNS: _X_Neg.   __Seizures  __CVA  __TIA  __HA  __Depression  Cardiac: __Neg  __CAD  __Stents  __MI  _X_HTN  __CHF  Pulmonary: __Neg  __COPD  __Asthma  __Sleep Apnea  __Smoker PPD  __CPAP  GI:_X_Neg.  __Reflux  __Liver Dysfunction  __Hepatitis  __Hiatal Hernia  __Hepatitis  __ETOH  __GERD   Renal:  _X_Neg.  __CRI  __ESRD  Endocrine:  _X_Neg.  __Thyroid  __Diabetes  Heme:  _X_Neg.   __Blood thinners  __other     Cranial Nerves II-XII grossly intact.  No apparent distress.  Patient is alert and oriented times three.  No somnolence or slurred speech.     Lumbar spine has pain with flexion and extension lateral rotation  Lumbar facets are tender to palpation  No focal neurologic deficits noted  physical exam essentially unchanged from previous   Back Motion:   Lumbar / lumbosacral spine abnormal.      Tenderness on Palpation:   Lumbosacral Spine:  There is tenderness on palpation of the  left sciatic notch moderate to severe in nature.      Other Examinations:   Left jethro-fabere test positive.   Gaenslen's Left Positive         Additional Physical Findings:  General general appearance normal appears comfortable,   Oriented to time, place and person, pleasant, seated  Not uncomfortable  Head normal head exam  Eyes normal eye " exam  Chest normal chest exam  Respiratory normal respiratory exam  Musculoskeletal abnormal low back pain and knee pain,   Joint tenderness  Posture normal  Neurologic normal neurologic exam  Skin normal skin exam       Toxicology Report   Toxicology was performed.   Reason for Toxicology:  A presumptive urine drug screen was done today to rapidly obtain and integrate results into clinical assessment and decision-making for ongoing safe prescribing of controlled substances.   Test Date/Time: 02/27/2025 00:00   Tested By: KONSTANTIN   Oxycodone  (OXY): Result = Positive; Control = Positive   Morphine  (OPI): Result = Positive; Control = Positive   Amphetamines  (AMP): Result = Negative; Control = Positive   Oxazepam  (BZO): Result = Negative; Control = Positive   Methadone  (MTD): Result = Negative; Control = Positive   Secobarbital  (BAR): Result = Negative; Control = Positive   Tricyclic Antidepressants  (TCA): Result = Negative; Control = Positive   Nortriptyline  (TCA): Result = Negative; Control = Positive   Marijuana-Carboxy Tetrahydrocannabinoid   (THC): Result = Negative; Control = Positive   Cocaine  (MEDARDO): Result = Negative; Control = Positive   Ecstasy-Methylenedioxymethamphetamine  (MDMA): Result = Negative; Control = Positive   D Methamphetamine  (MET): Result = Negative; Control = Positive   Phencyclidine  (PCP): Result = Negative; Control = Positive   Adulterants  (OX, SG, pH): Result = Negative; Control = Positive      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.1) - Testicular hypofunction  (M54.16) - Lumbar radiculopathy  (M47.812) - Cervical spondylosis without myelopathy  (M47.817) - Lumbosacral spondylosis  (M54.12) - Cervical radiculopathy  (M54.2) - Neck pain  (G89.4) - Chronic pain syndrome  (M53.3) - Disorder of sacrum      Plan:   Follow up visit 8 weeks      -refilled Percocet -- due 03/04/2025 (original hold date)   -scheduled  Bilateral L4-L5 RFTC at [facility] on 03/05/2025 at 12:30 pm   -refilled Testosterone with 2 RF -- due today (still has a refill)   -records reviewed from Dr. Graves   -Sent rx for Flexeril 10 mg TID, Gabapentin 600 mg TID  and Voltaren gel ([facility])  to [facility] with 5 RF (due again in June 2025)   -drink plenty of fluids and water  -increase fiber in diet  -call sooner with worsening pain  -pump refill was done on 02/19/2025  -next alarm date will be 08/15/2025  -refill Vit D 73601 unit weekly to [facility] with 5 RF  -will did receive cardiac clearance from Dr. Arteaga    -refer to Rheumatology for all over joint pain and myalgia (July 9, 2025 at 8:30 am)   -hand out given about SI joint fusion (may proceed with this on return visit)   -Keep follow up with Dr. Graves--reviewed his last note from Oct states that he can resume testosterone once his PSA is normal again...        Monitored Anesthesia Care medical necessity authorization request:   Monitor anesthesia request is medically indicated for the scheduled _lumbar RFTC_____procedure due to:     - needle phobia and anxiety, placing the patient at risk during the provided service._YES____  - patient has a BMI greater than 45 ____  - patient has severe sleep apnea for which BiPAP and oxygen are needed while sleeping._____  - patient is unable to follow simple commands due to mental state.____  - patient has an ASA class greater than 3 and requires constant presence of an anesthesiologist/CRNA during the procedure.____  - patient has severe problems with muscles and muscle spasticity that makes it hard to lie still. ____  - patient suffers from chronic pain and is unable to function due to diminished ADL's._YES___  - patient is dependent on opioids or sedatives. _YES___   - Other __YES__         Patient will be scheduled for facet joint injections of the lumbar spine. We have discussed the need for two diagnostically sound procedures before the radiofrequency ablation  of the medial branch nerves can be scheduled. Pt has had 2 (+) diagnostic blocks schedule for Bilateral L4-5 RFTC.     __X__2+ diagnostic MBB with each providing 80% relief of pain  Dates __05/2024_Lumbar RFTC_____ PIPA __80___% and _____________PIPA____%                                                                                      OR  __X__At least 50% improvement in the ability to perform previously painful movements and ADL's.           NARCOTIC STATEMENT  Patient is taking the narcotic pain medications as prescribed. Refill is being given because of the benefit to the patient in regards to the pain. Patient has agreed not to abuse of medication and not to take it more than what is prescribed. The nature of the drug including the potential for addiction and dependency and abuse was also discussed with the patient. Patient has developed physical dependency for the narcotic pain medication for his pain relief.  Patient has also developed tolerance to the sedative effect of the narcotic pain medications.  Patient has decided to continue with these medications despite potential for addiction as described by this office.  This was stressed to the patient that it is the patient's responsibility to secure the narcotic medication and in any event of loss for any reason whatsoever,  there will be no refill before the next due date. Patient also understands that they are not supposed to drive or work on machinery while taking these medications.  Also explained to the patient that in the event of traffic citation, the presence of this drug in  bloodstream may result in DUI.  Patient has been advised not to drink alcohol while taking this medication.  Patient has verbalized understanding of our office policy and has signed a contract with us in this regard.                  Prescriptions Written Today:  Percocet Oral Tablet  MG  Take 1 tablet twice a day as needed for 30 day(s)  Refills: No Refills  Rx quantity:  60  Take 1 tablet twice a day as needed for 30 day(s)  Refills: No Refills  Rx quantity: 60                 Mary  Brian       Electronically signed: 2/27/2025 5:06:23 PM        Principal Problem:    Chief Complaint:      HPI:       Assessment/Plan:         Jaziel Pierson MD  Pain Management  [facility] - Pain Management

## 2025-03-31 ENCOUNTER — EXTERNAL CHRONIC CARE MANAGEMENT (OUTPATIENT)
Dept: FAMILY MEDICINE | Facility: CLINIC | Age: 55
End: 2025-03-31
Payer: MEDICARE

## 2025-03-31 PROCEDURE — G0511 CCM/BHI BY RHC/FQHC 20MIN MO: HCPCS | Mod: ,,, | Performed by: FAMILY MEDICINE

## 2025-04-30 ENCOUNTER — EXTERNAL CHRONIC CARE MANAGEMENT (OUTPATIENT)
Dept: FAMILY MEDICINE | Facility: CLINIC | Age: 55
End: 2025-04-30
Payer: MEDICARE

## 2025-04-30 PROCEDURE — 99490 CHRNC CARE MGMT STAFF 1ST 20: CPT | Mod: ,,, | Performed by: FAMILY MEDICINE

## 2025-05-31 ENCOUNTER — EXTERNAL CHRONIC CARE MANAGEMENT (OUTPATIENT)
Dept: FAMILY MEDICINE | Facility: CLINIC | Age: 55
End: 2025-05-31
Payer: MEDICARE

## 2025-05-31 PROCEDURE — 99490 CHRNC CARE MGMT STAFF 1ST 20: CPT | Mod: ,,, | Performed by: FAMILY MEDICINE

## 2025-06-16 ENCOUNTER — PATIENT OUTREACH (OUTPATIENT)
Facility: HOSPITAL | Age: 55
End: 2025-06-16
Payer: MEDICARE

## 2025-06-16 NOTE — PROGRESS NOTES
Population Health Chart Review & Patient Outreach Details    Updates Requested / Reviewed:  []  Care Team Updated  [x]  Care Everywhere Updated & Reviewed    Health Maintenance Topics Addressed and Outreach Outcomes / Actions Taken:  Labs [x] Note added to next appointment to obtain labs needed for [employer]

## 2025-06-30 ENCOUNTER — EXTERNAL CHRONIC CARE MANAGEMENT (OUTPATIENT)
Dept: FAMILY MEDICINE | Facility: CLINIC | Age: 55
End: 2025-06-30
Payer: MEDICARE

## 2025-06-30 PROCEDURE — 99490 CHRNC CARE MGMT STAFF 1ST 20: CPT | Mod: ,,, | Performed by: FAMILY MEDICINE

## 2025-07-07 ENCOUNTER — OFFICE VISIT (OUTPATIENT)
Dept: CARDIOLOGY | Facility: CLINIC | Age: 55
End: 2025-07-07
Payer: MEDICARE

## 2025-07-07 VITALS
RESPIRATION RATE: 18 BRPM | SYSTOLIC BLOOD PRESSURE: 130 MMHG | HEIGHT: 74 IN | HEART RATE: 59 BPM | BODY MASS INDEX: 31.95 KG/M2 | DIASTOLIC BLOOD PRESSURE: 88 MMHG | WEIGHT: 249 LBS

## 2025-07-07 DIAGNOSIS — I10 PRIMARY HYPERTENSION: Primary | ICD-10-CM

## 2025-07-07 DIAGNOSIS — I42.8 NON-ISCHEMIC CARDIOMYOPATHY: ICD-10-CM

## 2025-07-07 PROCEDURE — 93005 ELECTROCARDIOGRAM TRACING: CPT | Mod: PBBFAC | Performed by: STUDENT IN AN ORGANIZED HEALTH CARE EDUCATION/TRAINING PROGRAM

## 2025-07-07 PROCEDURE — 1159F MED LIST DOCD IN RCRD: CPT | Mod: CPTII,,, | Performed by: STUDENT IN AN ORGANIZED HEALTH CARE EDUCATION/TRAINING PROGRAM

## 2025-07-07 PROCEDURE — 3008F BODY MASS INDEX DOCD: CPT | Mod: CPTII,,, | Performed by: STUDENT IN AN ORGANIZED HEALTH CARE EDUCATION/TRAINING PROGRAM

## 2025-07-07 PROCEDURE — 93010 ELECTROCARDIOGRAM REPORT: CPT | Mod: S$PBB,,, | Performed by: STUDENT IN AN ORGANIZED HEALTH CARE EDUCATION/TRAINING PROGRAM

## 2025-07-07 PROCEDURE — 99214 OFFICE O/P EST MOD 30 MIN: CPT | Mod: S$PBB,,, | Performed by: STUDENT IN AN ORGANIZED HEALTH CARE EDUCATION/TRAINING PROGRAM

## 2025-07-07 PROCEDURE — 4010F ACE/ARB THERAPY RXD/TAKEN: CPT | Mod: CPTII,,, | Performed by: STUDENT IN AN ORGANIZED HEALTH CARE EDUCATION/TRAINING PROGRAM

## 2025-07-07 PROCEDURE — 3079F DIAST BP 80-89 MM HG: CPT | Mod: CPTII,,, | Performed by: STUDENT IN AN ORGANIZED HEALTH CARE EDUCATION/TRAINING PROGRAM

## 2025-07-07 PROCEDURE — 3075F SYST BP GE 130 - 139MM HG: CPT | Mod: CPTII,,, | Performed by: STUDENT IN AN ORGANIZED HEALTH CARE EDUCATION/TRAINING PROGRAM

## 2025-07-07 PROCEDURE — 99213 OFFICE O/P EST LOW 20 MIN: CPT | Mod: PBBFAC | Performed by: STUDENT IN AN ORGANIZED HEALTH CARE EDUCATION/TRAINING PROGRAM

## 2025-07-07 PROCEDURE — 99999 PR PBB SHADOW E&M-EST. PATIENT-LVL III: CPT | Mod: PBBFAC,,, | Performed by: STUDENT IN AN ORGANIZED HEALTH CARE EDUCATION/TRAINING PROGRAM

## 2025-07-07 RX ORDER — HYDRALAZINE HYDROCHLORIDE 50 MG/1
50 TABLET, FILM COATED ORAL EVERY 12 HOURS
Qty: 180 TABLET | Refills: 3 | Status: SHIPPED | OUTPATIENT
Start: 2025-07-07 | End: 2026-07-07

## 2025-07-07 NOTE — PROGRESS NOTES
PCP: Otilio Vargas MD    Referring Provider:     Subjective:   Jesse Andrade is a 55 y.o. male with hx of HTN, HLD, NICM (LVEF recovered to 40-45%) who presents for followup.     25 - Doing well. Reports leg swelling with amlodipine. He is slightly hypertensive today. Will get his to sign up for digital HTN program     24 - Doing well. Denies any cardiac symptoms. Did not take  BP meds today    22 -  Patient statse he has been doing well over the past year.   Patient denies chest pain or shortness of breath.   Blood pressure elevated.  States he is taking medications.     21 - Patient states he is doing well.  Denies chest pain, SOB or orthopnea.  Denies lower extremity edema.  Blood pressure controlled.      EKG  21:  Normal sinus rhythm.  Voltage criteria for left ventricular hypertrophy   ECHO 21:  The ]left ventricle is normal in size with mild concentric hypertrophy and mildly decreased systolic function.  EF 45%.                              There is left ventricular global hypokinesis.                              Normal RV size and function.             20 - mild LV dilation. LVEF 40-45%. Nl RV   [facility]  - non-obst CAD. LV gram 35-40%    Fhx: Mother -  off heart attack  Shx: Denies smoking, etoh or drug      Lab Results   Component Value Date     2024    K 4.3 2024    CL 99 2024    CO2 31 2024    BUN 18 2024    CREATININE 1.72 (H) 2024    CALCIUM 9.3 2024    ANIONGAP 12 2024    ESTGFRAFRICA 95 10/12/2020    EGFRNONAA 79 10/12/2020       Lab Results   Component Value Date    CHOL 202 (H) 2023    CHOL 200 10/15/2020     Lab Results   Component Value Date    HDL 41 2023    HDL 39 10/15/2020     Lab Results   Component Value Date    LDLCALC 135 2023    LDLCALC 142 10/15/2020     Lab Results   Component Value Date    TRIG 128 2023    TRIG 97 10/15/2020     Lab Results   Component Value  "Date    CHOLHDL 4.9 01/11/2023    CHOLHDL 5.1 10/15/2020       Lab Results   Component Value Date    WBC 8.12 05/13/2024    HGB 14.6 05/13/2024    HCT 45.9 05/13/2024    MCV 90.4 05/13/2024     05/13/2024           Review of Systems   Respiratory:  Negative for cough and shortness of breath.    Cardiovascular:  Negative for chest pain, palpitations, orthopnea, claudication, leg swelling and PND.         Objective:   /88   Pulse (!) 59   Resp 18   Ht 6' 2" (1.88 m)   Wt 112.9 kg (249 lb)   BMI 31.97 kg/m²     Physical Exam  Vitals and nursing note reviewed.   Cardiovascular:      Rate and Rhythm: Normal rate and regular rhythm.      Pulses: Normal pulses.      Heart sounds: Normal heart sounds.   Pulmonary:      Breath sounds: Normal breath sounds.   Neurological:      Mental Status: He is oriented to person, place, and time.           Assessment:     1. Primary hypertension  Hypertension Digital Medicine (HDMP) Enrollment Order      2. Non-ischemic cardiomyopathy                  Plan:   Non-ischemic cardiomyopathy  non-ischemic. [facility] in 2018 with mild non-obstructive CAD.   - EF recovered from 35-40% to 40/45% on recent echo  - GDMT: coreg 25mg bid, on lisinorpril to 40mg; start hydralazine 50mg bid  - Device; not indicated  - Repeat ECHO with EF of 45%. Continue managment    Hypertension  Stop amlodipine for leg swelling  Start hydralazine 50mg bid  Digital HTN program           Hyperlipidemia  On lipitor 80mg qd          "

## 2025-07-07 NOTE — PATIENT INSTRUCTIONS
Follow up in 6 months with NP  Stop amlodipine  Start hydralazine 50mg twice a day  Sign up for digital HTN program with [facility].   
brother confirms hpi

## 2025-07-07 NOTE — ASSESSMENT & PLAN NOTE
non-ischemic. [facility] in 2018 with mild non-obstructive CAD.   - EF recovered from 35-40% to 40/45% on recent echo  - GDMT: coreg 25mg bid, on lisinorpril to 40mg; start hydralazine 50mg bid  - Device; not indicated  - Repeat ECHO with EF of 45%. Continue managment

## 2025-07-08 LAB
OHS QRS DURATION: 94 MS
OHS QTC CALCULATION: 401 MS

## 2025-07-31 ENCOUNTER — EXTERNAL CHRONIC CARE MANAGEMENT (OUTPATIENT)
Dept: FAMILY MEDICINE | Facility: CLINIC | Age: 55
End: 2025-07-31
Payer: MEDICARE

## 2025-07-31 PROCEDURE — 99490 CHRNC CARE MGMT STAFF 1ST 20: CPT | Mod: ,,, | Performed by: FAMILY MEDICINE

## 2025-08-20 ENCOUNTER — HOSPITAL ENCOUNTER (OUTPATIENT)
Facility: HOSPITAL | Age: 55
Discharge: HOME | End: 2025-08-20
Attending: ANESTHESIOLOGY | Admitting: ANESTHESIOLOGY
Payer: MEDICARE

## 2025-08-20 VITALS
DIASTOLIC BLOOD PRESSURE: 84 MMHG | SYSTOLIC BLOOD PRESSURE: 147 MMHG | OXYGEN SATURATION: 97 % | RESPIRATION RATE: 18 BRPM | BODY MASS INDEX: 29.22 KG/M2 | HEART RATE: 72 BPM | HEIGHT: 76 IN | WEIGHT: 240 LBS | TEMPERATURE: 98 F

## 2025-08-20 DIAGNOSIS — G89.4 CHRONIC PAIN SYNDROME: ICD-10-CM

## 2025-08-20 PROCEDURE — 63600175 PHARM REV CODE 636 W HCPCS: Performed by: ANESTHESIOLOGY

## 2025-08-20 PROCEDURE — 62370 ANL SP INF PMP W/MDREPRG&FIL: CPT | Performed by: ANESTHESIOLOGY

## 2025-08-20 RX ADMIN — MORPHINE SULFATE: 10 INJECTION, SOLUTION EPIDURAL; INTRATHECAL at 09:08

## (undated) DEVICE — GLOVE BIOGEL ECLIPSE SZ 7.5

## (undated) DEVICE — CATH IV 22G X 1 AUTOGUARD

## (undated) DEVICE — GLOVE PROTEXIS PI SYN SURG 7.5

## (undated) DEVICE — GLOVE PROTEXIS PI SYN SURG 6.5

## (undated) DEVICE — TRAY NERVE BLOCK UNIV 10/CA

## (undated) DEVICE — APPLICATOR CHLORAPREP ORN 26ML

## (undated) DEVICE — CHLORAPREP W TINT 26ML APPL

## (undated) DEVICE — CATH INTROCAN SAF 2 IV 22GX1IN

## (undated) DEVICE — DRAPE THREE-QUARTER 53X77IN

## (undated) DEVICE — Device

## (undated) DEVICE — GLOVE SENSICARE PI ALOE 8

## (undated) DEVICE — SLIPPER FALL PREV YEL BARIAT

## (undated) DEVICE — NDL SPINAL SPINOCAN 22GX3.5

## (undated) DEVICE — GLOVE SENSICARE PI SURG 6.5

## (undated) DEVICE — KIT IV START RUSH

## (undated) DEVICE — GLOVE SURGICAL PROTEXIS PI CLASSIC SIZE 7.5

## (undated) DEVICE — PAD ELECTROSURGICAL PAT PLATE

## (undated) DEVICE — SOL CONTINU-FLO SET 2 LAV

## (undated) DEVICE — KIT IV START

## (undated) DEVICE — NDL STR RX EPIDRL 18G 3.5IN

## (undated) DEVICE — NDL TUOHY 22G X 3.5

## (undated) DEVICE — DRAPE IOBAN2 INCISE 10.5X8IN

## (undated) DEVICE — APPLICATOR CHLORAPREP TINTED ORANGE 26ML (FOR PAIN TX ORDERS

## (undated) DEVICE — TRAY EPIDURAL SINGLE SHOT PMA

## (undated) DEVICE — PUMP REFILL KIT

## (undated) DEVICE — CATH VERSA-KATH 21G 12IN CLEAR

## (undated) DEVICE — NDL TUOHY EPIDRL PNK 18GX3.5IN

## (undated) DEVICE — SET EXT STD BORE CATH 7.6IN

## (undated) DEVICE — SLIPPER FALL PREV YELLOW XLG

## (undated) DEVICE — CATH IV INTROCAN 24G X 3/4

## (undated) DEVICE — SET EXT IV CATH ONE LINK 8.5IN

## (undated) DEVICE — ELECTRODE REM PLYHSV RETURN 9

## (undated) DEVICE — CHLORAPREP 10.5 ML APPLICATOR

## (undated) DEVICE — DRAPE THREE-QTR REINF 53X77IN

## (undated) DEVICE — CANNULA VENOM 20G 150MM

## (undated) DEVICE — GLOVE GAMMEX 7 PF STERILE

## (undated) DEVICE — PAD ELECTROSURGICAL SPL W/CORD

## (undated) DEVICE — GLOVE SENSICARE PI ALOE 7.5

## (undated) DEVICE — APPLICATOR CHLORAPREP HI-LITE TINTED ORANGE 26ML

## (undated) DEVICE — CANNULA VENOM 20G 10X100MM

## (undated) DEVICE — TOWEL OR DISP STRL BLUE 4/PK

## (undated) DEVICE — NDL SPINAL CLR HUB 22GX4 3/4